# Patient Record
Sex: MALE | Race: WHITE | NOT HISPANIC OR LATINO | Employment: PART TIME | URBAN - METROPOLITAN AREA
[De-identification: names, ages, dates, MRNs, and addresses within clinical notes are randomized per-mention and may not be internally consistent; named-entity substitution may affect disease eponyms.]

---

## 2019-06-14 ENCOUNTER — TELEPHONE (OUTPATIENT)
Dept: FAMILY MEDICINE CLINIC | Facility: CLINIC | Age: 69
End: 2019-06-14

## 2019-06-14 ENCOUNTER — OFFICE VISIT (OUTPATIENT)
Dept: FAMILY MEDICINE CLINIC | Facility: CLINIC | Age: 69
End: 2019-06-14

## 2019-06-14 VITALS
HEART RATE: 84 BPM | DIASTOLIC BLOOD PRESSURE: 86 MMHG | SYSTOLIC BLOOD PRESSURE: 138 MMHG | WEIGHT: 170 LBS | TEMPERATURE: 97.2 F | RESPIRATION RATE: 16 BRPM

## 2019-06-14 DIAGNOSIS — M54.41 ACUTE RIGHT-SIDED LOW BACK PAIN WITH RIGHT-SIDED SCIATICA: Primary | ICD-10-CM

## 2019-06-14 DIAGNOSIS — I10 ESSENTIAL HYPERTENSION: ICD-10-CM

## 2019-06-14 PROCEDURE — 99204 OFFICE O/P NEW MOD 45 MIN: CPT | Performed by: FAMILY MEDICINE

## 2019-06-14 PROCEDURE — 96372 THER/PROPH/DIAG INJ SC/IM: CPT | Performed by: FAMILY MEDICINE

## 2019-06-14 RX ORDER — TRIAMCINOLONE ACETONIDE 40 MG/ML
40 INJECTION, SUSPENSION INTRA-ARTICULAR; INTRAMUSCULAR ONCE
Status: COMPLETED | OUTPATIENT
Start: 2019-06-14 | End: 2019-06-14

## 2019-06-14 RX ORDER — TAMSULOSIN HYDROCHLORIDE 0.4 MG/1
1 CAPSULE ORAL DAILY
Refills: 0 | COMMUNITY
Start: 2019-06-06 | End: 2019-09-13

## 2019-06-14 RX ORDER — OXYCODONE HYDROCHLORIDE AND ACETAMINOPHEN 5; 325 MG/1; MG/1
1 TABLET ORAL EVERY 6 HOURS PRN
Qty: 10 TABLET | Refills: 0 | Status: SHIPPED | OUTPATIENT
Start: 2019-06-14 | End: 2019-06-20

## 2019-06-14 RX ORDER — METHOCARBAMOL 500 MG/1
1 TABLET, FILM COATED ORAL AS NEEDED
Refills: 0 | COMMUNITY
Start: 2019-06-01 | End: 2019-06-14

## 2019-06-14 RX ORDER — LISINOPRIL 10 MG/1
10 TABLET ORAL DAILY
Qty: 90 TABLET | Refills: 1 | Status: SHIPPED | OUTPATIENT
Start: 2019-06-14 | End: 2019-06-20

## 2019-06-14 RX ORDER — SULFAMETHOXAZOLE AND TRIMETHOPRIM 800; 160 MG/1; MG/1
1 TABLET ORAL 2 TIMES DAILY
Refills: 0 | COMMUNITY
Start: 2019-06-06 | End: 2019-06-20

## 2019-06-14 RX ORDER — PREDNISONE 10 MG/1
TABLET ORAL
Qty: 21 TABLET | Refills: 0 | Status: SHIPPED | OUTPATIENT
Start: 2019-06-14 | End: 2019-06-20

## 2019-06-14 RX ORDER — TIZANIDINE HYDROCHLORIDE 4 MG/1
4 CAPSULE, GELATIN COATED ORAL 3 TIMES DAILY PRN
Qty: 30 CAPSULE | Refills: 0 | Status: SHIPPED | OUTPATIENT
Start: 2019-06-14 | End: 2019-09-13

## 2019-06-14 RX ADMIN — TRIAMCINOLONE ACETONIDE 40 MG: 40 INJECTION, SUSPENSION INTRA-ARTICULAR; INTRAMUSCULAR at 09:35

## 2019-06-14 NOTE — PATIENT INSTRUCTIONS
Go to the nearest ER or return to clinic if symptoms worsen, fever/chill develop    Sciatica  Sciatica is pain, numbness, weakness, or tingling along your sciatic nerve. The sciatic nerve starts in the lower back and goes down the back of each leg. Sciatica happens when this nerve is pinched or has pressure put on it. Sciatica usually goes away on its own or with treatment. Sometimes, sciatica may keep coming back (recur).  Follow these instructions at home:  Medicines  · Take over-the-counter and prescription medicines only as told by your doctor.  · Do not drive or use heavy machinery while taking prescription pain medicine.  Managing pain  · If directed, put ice on the affected area.  ? Put ice in a plastic bag.  ? Place a towel between your skin and the bag.  ? Leave the ice on for 20 minutes, 2-3 times a day.  · After icing, apply heat to the affected area before you exercise or as often as told by your doctor. Use the heat source that your doctor tells you to use, such as a moist heat pack or a heating pad.  ? Place a towel between your skin and the heat source.  ? Leave the heat on for 20-30 minutes.  ? Remove the heat if your skin turns bright red. This is especially important if you are unable to feel pain, heat, or cold. You may have a greater risk of getting burned.  Activity  · Return to your normal activities as told by your doctor. Ask your doctor what activities are safe for you.  ? Avoid activities that make your sciatica worse.  · Take short rests during the day. Rest in a lying or standing position. This is usually better than sitting to rest.  ? When you rest for a long time, do some physical activity or stretching between periods of rest.  ? Avoid sitting for a long time without moving. Get up and move around at least one time each hour.  · Exercise and stretch regularly, as told by your doctor.  · Do not lift anything that is heavier than 10 lb (4.5 kg) while you have symptoms of  sciatica.  ? Avoid lifting heavy things even when you do not have symptoms.  ? Avoid lifting heavy things over and over.  · When you lift objects, always lift in a way that is safe for your body. To do this, you should:  ? Bend your knees.  ? Keep the object close to your body.  ? Avoid twisting.  General instructions  · Use good posture.  ? Avoid leaning forward when you are sitting.  ? Avoid hunching over when you are standing.  · Stay at a healthy weight.  · Wear comfortable shoes that support your feet. Avoid wearing high heels.  · Avoid sleeping on a mattress that is too soft or too hard. You might have less pain if you sleep on a mattress that is firm enough to support your back.  · Keep all follow-up visits as told by your doctor. This is important.  Contact a doctor if:  · You have pain that:  ? Wakes you up when you are sleeping.  ? Gets worse when you lie down.  ? Is worse than the pain you have had in the past.  ? Lasts longer than 4 weeks.  · You lose weight for without trying.  Get help right away if:  · You cannot control when you pee (urinate) or poop (have a bowel movement).  · You have weakness in any of these areas and it gets worse.  ? Lower back.  ? Lower belly (pelvis).  ? Butt (buttocks).  ? Legs.  · You have redness or swelling of your back.  · You have a burning feeling when you pee.  This information is not intended to replace advice given to you by your health care provider. Make sure you discuss any questions you have with your health care provider.  Document Released: 09/26/2009 Document Revised: 05/25/2017 Document Reviewed: 08/26/2016  [employer] Patient Education © 2019 [employer] Inc[employer]

## 2019-06-14 NOTE — TELEPHONE ENCOUNTER
----- Message from Sky Diaz Rep sent at 6/14/2019  1:14 PM EDT -----  Contact: LASHANDA CRAWFORD  TiZANidine (ZANAFLEX) 4 MG capsule REQUESTING TO CHANGE TO TABLETS BECAUSE TABLETS ARE COVERED BY PATIENTS [facility].    [phone]

## 2019-06-14 NOTE — PROGRESS NOTES
Subjective   Cayden Nielsen is a 69 y.o. male.   Chief Complaint   Patient presents with   • Establish Care   • Back Pain     into the right leg x 3 weeks, patient was seen at [facility] [place] and [facility]      Back pain started abruptly, no injury. Thought it was a kidney stone, however was evaluated at [facility] [place] and was confirmed to not have a kidney stone. Treated with muscle relaxer and naproxen. Days later, he went to [facility] and was told that it could be sciatica  Treated with Tamsulosin to help with possible kidney stone, bactrim for possible prostatitis, and methocarbamol for sciatica. He was provided an injection at [facility], Toradol. Hydrocodone didn't improve pain.   Heating pad does help symptoms temporarily.    He does lift often at work. He is a  at [employer]. Stopped lifting 50 lbs potatoes recently, causing back pain. He has been off from work for 2 weeks due to acute symptoms.      Back Pain   This is a new problem. The current episode started 1 to 4 weeks ago (3 weeks). The problem occurs constantly. The problem is unchanged. The pain is present in the lumbar spine. The quality of the pain is described as burning and aching. The pain radiates to the right thigh. The pain is at a severity of 8/10. The pain is moderate. The pain is worse during the night. The symptoms are aggravated by lying down and standing. Associated symptoms include leg pain, numbness, paresthesias and tingling. Pertinent negatives include no bladder incontinence, bowel incontinence, chest pain, fever, pelvic pain or perianal numbness. He has tried muscle relaxant, NSAIDs, heat and home exercises for the symptoms. The treatment provided mild relief.     His blood pressure has also been elevated on recent visits at ER and [facility]. He never had primary care prior to today, no history of HTN.   Denies chest pain, headaches, dizziness.   Systolic blood pressure has been in 150s.     The following portions of the patient's history were reviewed and  updated as appropriate: allergies, current medications, past family history, past medical history, past social history, past surgical history and problem list.    Review of Systems   Constitutional: Negative for chills, fatigue and fever.   HENT: Negative.    Eyes: Negative.    Respiratory: Negative for cough, chest tightness and shortness of breath.    Cardiovascular: Negative for chest pain and palpitations.   Gastrointestinal: Negative.  Negative for bowel incontinence.   Genitourinary: Negative for urinary incontinence and pelvic pain.   Musculoskeletal: Positive for back pain.   Neurological: Positive for tingling, numbness and paresthesias. Negative for light-headedness, headache and confusion.   Hematological: Negative for adenopathy. Does not bruise/bleed easily.   Psychiatric/Behavioral: Negative.        Objective   Physical Exam   Constitutional: He is oriented to person, place, and time. He appears well-developed and well-nourished. No distress.   HENT:   Head: Normocephalic.   Right Ear: Hearing and external ear normal.   Left Ear: Hearing and external ear normal.   Nose: Nose normal.   Eyes: Conjunctivae are normal.   Neck: Neck supple. No thyromegaly present.   Cardiovascular: Normal rate, regular rhythm and normal heart sounds.   No murmur heard.  Pulmonary/Chest: Effort normal and breath sounds normal. He has no wheezes.   Musculoskeletal: He exhibits no edema or deformity.        Lumbar back: He exhibits decreased range of motion, tenderness (right side) and spasm.   Lymphadenopathy:     He has no cervical adenopathy.   Neurological: He is alert and oriented to person, place, and time. A sensory deficit (Right anterior thigh) is present.   Reflex Scores:       Patellar reflexes are 1+ on the right side and 2+ on the left side.       Achilles reflexes are 1+ on the right side and 2+ on the left side.  Muscle strength: RLE 4/5, LLE 5/5   Skin: Skin is warm and dry.   Psychiatric: He has a normal mood  and affect. His behavior is normal. Thought content normal.   Nursing note and vitals reviewed.        Assessment/Plan   Cayden was seen today for establish care and back pain.    Diagnoses and all orders for this visit:    Acute right-sided low back pain with right-sided sciatica  -     triamcinolone acetonide (KENALOG-40) injection 40 mg  -     predniSONE (DELTASONE) 10 MG tablet; Take 60 mg po day 1, 50 mg day 2, 40 mg day 3, 30 mg day 4, 20 mg day 5, 10 mg day 6  -     TiZANidine (ZANAFLEX) 4 MG capsule; Take 1 capsule by mouth 3 (Three) Times a Day As Needed for Muscle Spasms.  -     Ambulatory Referral to Physical Therapy Evaluate and treat  -     oxyCODONE-acetaminophen (PERCOCET) 5-325 MG per tablet; Take 1 tablet by mouth Every 6 (Six) Hours As Needed for Moderate Pain .    Essential hypertension  -     lisinopril (PRINIVIL,ZESTRIL) 10 MG tablet; Take 1 tablet by mouth Daily.      Kenalog IM provided in office today, tolerated well.   Stop methocarbamol due to ineffectiveness. Tizanidine, prednisone taper and temporary pain relief to improve back pain and sciatica. Referred to PT. If symptoms persist, consider MRI lumbar.   Will provide time off from work for recovery.   Start lisinopril to improve HTN. Advised to monitor blood pressure daily, goal is 120/80. Will need to complete labs at follow up visit.    ISELA query complete. Treatment plan to include limited course of prescribed controlled substance. Risks including addiction, benefits, and alternatives presented to patient.

## 2019-06-20 ENCOUNTER — OFFICE VISIT (OUTPATIENT)
Dept: FAMILY MEDICINE CLINIC | Facility: CLINIC | Age: 69
End: 2019-06-20

## 2019-06-20 VITALS
SYSTOLIC BLOOD PRESSURE: 82 MMHG | TEMPERATURE: 97.9 F | DIASTOLIC BLOOD PRESSURE: 58 MMHG | RESPIRATION RATE: 18 BRPM | WEIGHT: 170.5 LBS | HEART RATE: 80 BPM

## 2019-06-20 DIAGNOSIS — I95.2 HYPOTENSION DUE TO DRUGS: ICD-10-CM

## 2019-06-20 DIAGNOSIS — M54.41 ACUTE RIGHT-SIDED LOW BACK PAIN WITH RIGHT-SIDED SCIATICA: Primary | ICD-10-CM

## 2019-06-20 PROCEDURE — 99214 OFFICE O/P EST MOD 30 MIN: CPT | Performed by: FAMILY MEDICINE

## 2019-06-20 RX ORDER — IBUPROFEN 800 MG/1
800 TABLET ORAL EVERY 8 HOURS PRN
Qty: 90 TABLET | Refills: 0 | Status: SHIPPED | OUTPATIENT
Start: 2019-06-20 | End: 2019-09-13

## 2019-06-20 RX ORDER — HYDROCODONE BITARTRATE AND ACETAMINOPHEN 5; 325 MG/1; MG/1
1 TABLET ORAL EVERY 6 HOURS PRN
Qty: 10 TABLET | Refills: 0 | Status: SHIPPED | OUTPATIENT
Start: 2019-06-20 | End: 2019-09-13

## 2019-06-20 NOTE — PROGRESS NOTES
"Subjective   Cayden Nielsen is a 69 y.o. male.   Chief Complaint   Patient presents with   • Back Pain     Started again couple days ago. BP low today. Patient said he has been a bit \"woozy\" lately.      History of Present Illness   He was seen on 6/14/19 for back pain, treated with Toradol IM, tizanidine, percocet, and prednisone taper. He only took percocet twice daily, completed script.   Prior to his treatment on 6/14/2019 with me, he had been treated at an urgent treatment center and at an ER.  Imaging was completed at outside facility, however I don't have records.   Pain still present, sometimes severe to the point he gets nauseous.    Initially after treatment on 6/14/19, he had relief for 3-4 days. He did yard work, chores at home, mopped. Symptoms started again 2 nights ago.   Pain tends to be worse at night.   He was referred to physical therapy, planning on scheduling appointment tomorrow.  He works at [employer] in the [employer] department.  His job requires that he lift, bend, pull and push frequently.  He has been out of work over the last 2 weeks due to symptoms of acute back pain.    Blood pressure is low today.  Last visit, blood pressure was elevated and he reported that it was elevated at previous visits with outside providers.  He was started on lisinopril 10 mg daily.  He denies feeling lightheaded, dizziness, or any syncopal episodes.  He has not been monitoring his blood pressure at home.    The following portions of the patient's history were reviewed and updated as appropriate: allergies, current medications, past family history, past medical history, past social history, past surgical history and problem list.    Review of Systems   Constitutional: Negative.    Eyes: Negative.    Respiratory: Negative for cough and shortness of breath.    Cardiovascular: Negative for chest pain, palpitations and leg swelling.   Gastrointestinal: Negative.    Genitourinary: Negative for urinary incontinence. "   Musculoskeletal: Positive for back pain.   Neurological: Positive for numbness. Negative for dizziness, syncope, light-headedness and confusion.   Psychiatric/Behavioral: Negative.        Objective   Physical Exam   Constitutional: He is oriented to person, place, and time. He appears well-developed and well-nourished. No distress.   HENT:   Head: Normocephalic.   Right Ear: Hearing and external ear normal.   Left Ear: Hearing and external ear normal.   Nose: Nose normal.   Eyes: Conjunctivae are normal.   Cardiovascular: Normal rate, regular rhythm and normal heart sounds.   Pulmonary/Chest: Effort normal and breath sounds normal.   Musculoskeletal: He exhibits no deformity.        Lumbar back: He exhibits decreased range of motion, tenderness (right side) and spasm.   Neurological: He is alert and oriented to person, place, and time.   Skin: Skin is warm and dry.   Psychiatric: His behavior is normal.   Nursing note and vitals reviewed.        Assessment/Plan   Cayden was seen today for back pain.    Diagnoses and all orders for this visit:    Acute right-sided low back pain with right-sided sciatica  -     HYDROcodone-acetaminophen (NORCO) 5-325 MG per tablet; Take 1 tablet by mouth Every 6 (Six) Hours As Needed for Moderate Pain  (use for breakthrough pain).  -     ibuprofen (ADVIL,MOTRIN) 800 MG tablet; Take 1 tablet by mouth Every 8 (Eight) Hours As Needed for Moderate Pain .    Hypotension due to drugs      Additional pain relief prescribed. Recommended that he schedule PT as soon as possible.   If symptoms continue to persist, consider MRI of lumbar spine.    He is hypotensive today, but asymptomatic.  Advised him to stop lisinopril 10 mg daily.  Monitor blood pressure at home, if greater than 130/90 for 3 consecutive days, will resume treatment but at a lower dose.

## 2019-09-13 ENCOUNTER — OFFICE VISIT (OUTPATIENT)
Dept: FAMILY MEDICINE CLINIC | Facility: CLINIC | Age: 69
End: 2019-09-13

## 2019-09-13 VITALS
DIASTOLIC BLOOD PRESSURE: 82 MMHG | WEIGHT: 176 LBS | RESPIRATION RATE: 18 BRPM | SYSTOLIC BLOOD PRESSURE: 130 MMHG | HEART RATE: 82 BPM | BODY MASS INDEX: 25.2 KG/M2 | TEMPERATURE: 98.5 F | HEIGHT: 70 IN

## 2019-09-13 DIAGNOSIS — I95.2 HYPOTENSION DUE TO DRUGS: ICD-10-CM

## 2019-09-13 DIAGNOSIS — Z13.6 ENCOUNTER FOR LIPID SCREENING FOR CARDIOVASCULAR DISEASE: ICD-10-CM

## 2019-09-13 DIAGNOSIS — M54.50 LUMBAR BACK PAIN: Primary | ICD-10-CM

## 2019-09-13 DIAGNOSIS — Z12.5 PROSTATE CANCER SCREENING: ICD-10-CM

## 2019-09-13 DIAGNOSIS — Z13.220 ENCOUNTER FOR LIPID SCREENING FOR CARDIOVASCULAR DISEASE: ICD-10-CM

## 2019-09-13 PROCEDURE — 99213 OFFICE O/P EST LOW 20 MIN: CPT | Performed by: FAMILY MEDICINE

## 2019-09-13 NOTE — PROGRESS NOTES
Assessment/Plan       Problems Addressed this Visit     None      Visit Diagnoses     Lumbar back pain    -  Primary    Resolved. Doing better. P T helped and no pain now    Hypotension due to drugs        Resolved and BP good now    Encounter for lipid screening for cardiovascular disease        Relevant Orders    Lipid Panel With / Chol / HDL Ratio    Comprehensive Metabolic Panel    Prostate cancer screening        Relevant Orders    PSA Screen            Follow up: Return if symptoms worsen or fail to improve, for follow up depends on review of labs and testing.     DISCUSSION  Overall doing well.  Recommend check labs as noted.  Further plan once labs reviewed.  He does not wish to receive a pneumococcal vaccine or flu shot.  He also declines colonoscopy.      MEDICATIONS PRESCRIBED  Requested Prescriptions      No prescriptions requested or ordered in this encounter            -------------------------------------------    Subjective     Chief Complaint   Patient presents with   • Back Pain     3 month f/u          History of Present Illness    Back pain  Is doing better  P T for 8 visits and doing better now  No pain now  No leg pain     BP was low last visit  Had elevated BP in the past  Was started on lisinopril and then BP dropped and quit this  No issues since  No dizziness  No chest pain   No shortness of breath      Lipids not for years  FH: no family history of heart disease    Father  of lung cancer  Mother + cva and     Work 3-4 days per week at [employer]    Social History     Tobacco Use   Smoking Status Never Smoker   Smokeless Tobacco Never Used          Past Medical History,Medications, Allergies, and social history was reviewed.        Patient's Body mass index is 25.25 kg/m². BMI is within normal parameters. No follow-up required..         Review of Systems   Constitutional: Negative.    HENT: Negative.    Respiratory: Negative.    Cardiovascular: Negative.   "  Gastrointestinal: Negative.    Genitourinary: Negative.    Musculoskeletal: Negative.    Neurological: Negative.    Psychiatric/Behavioral: Negative.        Objective     Vitals:    09/13/19 0903   BP: 130/82   Pulse: 82   Resp: 18   Temp: 98.5 °F (36.9 °C)   Weight: 79.8 kg (176 lb)   Height: 177.8 cm (70\")          Physical Exam   Constitutional: Vital signs are normal. He appears well-developed and well-nourished.   HENT:   Head: Normocephalic and atraumatic.   Right Ear: Hearing, tympanic membrane, external ear and ear canal normal.   Left Ear: Hearing, tympanic membrane, external ear and ear canal normal.   Mouth/Throat: Oropharynx is clear and moist.   Eyes: Conjunctivae, EOM and lids are normal. Pupils are equal, round, and reactive to light.   Neck: Normal range of motion. Neck supple. No thyromegaly present.   Cardiovascular: Normal rate, regular rhythm and normal heart sounds. Exam reveals no friction rub.   No murmur heard.  Pulmonary/Chest: Effort normal and breath sounds normal. No respiratory distress. He has no wheezes. He has no rales.   Abdominal: Soft. Normal appearance and bowel sounds are normal. He exhibits no distension and no mass. There is no tenderness. There is no rebound and no guarding.   Musculoskeletal: He exhibits no edema.        Lumbar back: He exhibits normal range of motion, no tenderness and no bony tenderness.   Neurological: He is alert. He has normal strength.   Skin: Skin is warm and dry.   Psychiatric: He has a normal mood and affect. His speech is normal. Cognition and memory are normal.   Nursing note and vitals reviewed.                Chilango Nielsen MD    "

## 2019-09-14 LAB
ALBUMIN SERPL-MCNC: 4.3 G/DL (ref 3.5–5.2)
ALBUMIN/GLOB SERPL: 1.7 G/DL
ALP SERPL-CCNC: 107 U/L (ref 39–117)
ALT SERPL-CCNC: 15 U/L (ref 1–41)
AST SERPL-CCNC: 17 U/L (ref 1–40)
BILIRUB SERPL-MCNC: 0.4 MG/DL (ref 0.2–1.2)
BUN SERPL-MCNC: 19 MG/DL (ref 8–23)
BUN/CREAT SERPL: 20.2 (ref 7–25)
CALCIUM SERPL-MCNC: 9.1 MG/DL (ref 8.6–10.5)
CHLORIDE SERPL-SCNC: 104 MMOL/L (ref 98–107)
CHOLEST SERPL-MCNC: 169 MG/DL (ref 0–200)
CHOLEST/HDLC SERPL: 3.25 {RATIO}
CO2 SERPL-SCNC: 26.5 MMOL/L (ref 22–29)
CREAT SERPL-MCNC: 0.94 MG/DL (ref 0.76–1.27)
GLOBULIN SER CALC-MCNC: 2.5 GM/DL
GLUCOSE SERPL-MCNC: 97 MG/DL (ref 65–99)
HDLC SERPL-MCNC: 52 MG/DL (ref 40–60)
LDLC SERPL CALC-MCNC: 99 MG/DL (ref 0–100)
POTASSIUM SERPL-SCNC: 4.5 MMOL/L (ref 3.5–5.2)
PROT SERPL-MCNC: 6.8 G/DL (ref 6–8.5)
PSA SERPL-MCNC: 7.48 NG/ML (ref 0–4)
SODIUM SERPL-SCNC: 143 MMOL/L (ref 136–145)
TRIGL SERPL-MCNC: 90 MG/DL (ref 0–150)
VLDLC SERPL CALC-MCNC: 18 MG/DL

## 2019-09-17 DIAGNOSIS — R97.20 ELEVATED PSA: Primary | ICD-10-CM

## 2019-12-18 DIAGNOSIS — Z20.828 EXPOSURE TO THE FLU: Primary | ICD-10-CM

## 2019-12-18 RX ORDER — OSELTAMIVIR PHOSPHATE 75 MG/1
75 CAPSULE ORAL DAILY
Qty: 10 CAPSULE | Refills: 0 | Status: SHIPPED | OUTPATIENT
Start: 2019-12-18 | End: 2019-12-28

## 2020-09-01 DIAGNOSIS — I10 ESSENTIAL HYPERTENSION: ICD-10-CM

## 2020-09-01 RX ORDER — LISINOPRIL 10 MG/1
TABLET ORAL
Qty: 90 TABLET | Refills: 1 | OUTPATIENT
Start: 2020-09-01

## 2021-06-09 ENCOUNTER — OFFICE VISIT (OUTPATIENT)
Dept: FAMILY MEDICINE CLINIC | Facility: CLINIC | Age: 71
End: 2021-06-09

## 2021-06-09 VITALS
RESPIRATION RATE: 20 BRPM | DIASTOLIC BLOOD PRESSURE: 90 MMHG | HEIGHT: 70 IN | TEMPERATURE: 97.6 F | BODY MASS INDEX: 26.34 KG/M2 | OXYGEN SATURATION: 97 % | HEART RATE: 60 BPM | SYSTOLIC BLOOD PRESSURE: 148 MMHG | WEIGHT: 184 LBS

## 2021-06-09 DIAGNOSIS — M54.42 ACUTE LEFT-SIDED LOW BACK PAIN WITH LEFT-SIDED SCIATICA: Primary | ICD-10-CM

## 2021-06-09 DIAGNOSIS — D48.9 NEOPLASM OF UNCERTAIN BEHAVIOR: ICD-10-CM

## 2021-06-09 PROCEDURE — 99213 OFFICE O/P EST LOW 20 MIN: CPT | Performed by: FAMILY MEDICINE

## 2021-06-09 RX ORDER — CELECOXIB 200 MG/1
200 CAPSULE ORAL 2 TIMES DAILY PRN
Qty: 30 CAPSULE | Refills: 2 | Status: SHIPPED | OUTPATIENT
Start: 2021-06-09 | End: 2021-08-27

## 2021-06-09 NOTE — PATIENT INSTRUCTIONS
Go to the nearest ER or return to clinic if symptoms worsen, fever/chill develop    Sciatica    Sciatica is pain, weakness, tingling, or loss of feeling (numbness) along the sciatic nerve. The sciatic nerve starts in the lower back and goes down the back of each leg. Sciatica usually goes away on its own or with treatment. Sometimes, sciatica may come back (recur).  What are the causes?  This condition happens when the sciatic nerve is pinched or has pressure put on it. This may be the result of:  · A disk in between the bones of the spine bulging out too far (herniated disk).  · Changes in the spinal disks that occur with aging.  · A condition that affects a muscle in the butt.  · Extra bone growth near the sciatic nerve.  · A break (fracture) of the area between your hip bones (pelvis).  · Pregnancy.  · Tumor. This is rare.  What increases the risk?  You are more likely to develop this condition if you:  · Play sports that put pressure or stress on the spine.  · Have poor strength and ease of movement (flexibility).  · Have had a back injury in the past.  · Have had back surgery.  · Sit for long periods of time.  · Do activities that involve bending or lifting over and over again.  · Are very overweight (obese).  What are the signs or symptoms?  Symptoms can vary from mild to very bad. They may include:  · Any of these problems in the lower back, leg, hip, or butt:  ? Mild tingling, loss of feeling, or dull aches.  ? Burning sensations.  ? Sharp pains.  · Loss of feeling in the back of the calf or the sole of the foot.  · Leg weakness.  · Very bad back pain that makes it hard to move.  These symptoms may get worse when you cough, sneeze, or laugh. They may also get worse when you sit or stand for long periods of time.  How is this treated?  This condition often gets better without any treatment. However, treatment may include:  · Changing or cutting back on physical activity when you have pain.  · Doing exercises  and stretching.  · Putting ice or heat on the affected area.  · Medicines that help:  ? To relieve pain and swelling.  ? To relax your muscles.  · Shots (injections) of medicines that help to relieve pain, irritation, and swelling.  · Surgery.  Follow these instructions at home:  Medicines  · Take over-the-counter and prescription medicines only as told by your doctor.  · Ask your doctor if the medicine prescribed to you:  ? Requires you to avoid driving or using heavy machinery.  ? Can cause trouble pooping (constipation). You may need to take these steps to prevent or treat trouble pooping:  § Drink enough fluids to keep your pee (urine) pale yellow.  § Take over-the-counter or prescription medicines.  § Eat foods that are high in fiber. These include beans, whole grains, and fresh fruits and vegetables.  § Limit foods that are high in fat and sugar. These include fried or sweet foods.  Managing pain         · If told, put ice on the affected area.  ? Put ice in a plastic bag.  ? Place a towel between your skin and the bag.  ? Leave the ice on for 20 minutes, 2-3 times a day.  · If told, put heat on the affected area. Use the heat source that your doctor tells you to use, such as a moist heat pack or a heating pad.  ? Place a towel between your skin and the heat source.  ? Leave the heat on for 20-30 minutes.  ? Remove the heat if your skin turns bright red. This is very important if you are unable to feel pain, heat, or cold. You may have a greater risk of getting burned.  Activity    · Return to your normal activities as told by your doctor. Ask your doctor what activities are safe for you.  · Avoid activities that make your symptoms worse.  · Take short rests during the day.  ? When you rest for a long time, do some physical activity or stretching between periods of rest.  ? Avoid sitting for a long time without moving. Get up and move around at least one time each hour.  · Exercise and stretch regularly, as  told by your doctor.  · Do not lift anything that is heavier than 10 lb (4.5 kg) while you have symptoms of sciatica.  ? Avoid lifting heavy things even when you do not have symptoms.  ? Avoid lifting heavy things over and over.  · When you lift objects, always lift in a way that is safe for your body. To do this, you should:  ? Bend your knees.  ? Keep the object close to your body.  ? Avoid twisting.  General instructions  · Stay at a healthy weight.  · Wear comfortable shoes that support your feet. Avoid wearing high heels.  · Avoid sleeping on a mattress that is too soft or too hard. You might have less pain if you sleep on a mattress that is firm enough to support your back.  · Keep all follow-up visits as told by your doctor. This is important.  Contact a doctor if:  · You have pain that:  ? Wakes you up when you are sleeping.  ? Gets worse when you lie down.  ? Is worse than the pain you have had in the past.  ? Lasts longer than 4 weeks.  · You lose weight without trying.  Get help right away if:  · You cannot control when you pee (urinate) or poop (have a bowel movement).  · You have weakness in any of these areas and it gets worse:  ? Lower back.  ? The area between your hip bones.  ? Butt.  ? Legs.  · You have redness or swelling of your back.  · You have a burning feeling when you pee.  Summary  · Sciatica is pain, weakness, tingling, or loss of feeling (numbness) along the sciatic nerve.  · This condition happens when the sciatic nerve is pinched or has pressure put on it.  · Sciatica can cause pain, tingling, or loss of feeling (numbness) in the lower back, legs, hips, and butt.  · Treatment often includes rest, exercise, medicines, and putting ice or heat on the affected area.  This information is not intended to replace advice given to you by your health care provider. Make sure you discuss any questions you have with your health care provider.  Document Revised: 01/06/2020 Document Reviewed:  01/06/2020  [employer] Patient Education © 2021 [employer]

## 2021-06-09 NOTE — PROGRESS NOTES
"Chief Complaint  Back pain-L side-down leg x 1wk (Took Wife's Celebrex and helped a lot)    Subjective          Cayden Nielsen presents to [facility]  He has a history of low back pain and sciatica. Most recent episode started 1-2 weeks ago. No specific injury, thinks he just turned the wrong way.  Tried treating with ibuprofen and hydrocodone without relief. His wife has a prescription of Celebrex and he tried that. He has had significant relief with Celebrex, requesting a prescription.     Back Pain  This is a recurrent problem. The current episode started 1 to 4 weeks ago (1-2 weeks). The problem occurs 2 to 4 times per day. The pain is present in the sacro-iliac and lumbar spine. The pain radiates to the left knee. The pain is at a severity of 4/10. The pain is moderate. The symptoms are aggravated by standing and position. Associated symptoms include tingling (LLE). Pertinent negatives include no bladder incontinence or bowel incontinence. He has tried NSAIDs and analgesics for the symptoms. The treatment provided moderate relief.     Skin lesion on left lower extremity, dark color  Lesion present for over a year and has gotten darker     The following portions of the patient's history were reviewed and updated as appropriate: allergies, current medications, past family history, past medical history, past social history, past surgical history and problem list.    Objective   Vital Signs:   /90   Pulse 60   Temp 97.6 °F (36.4 °C)   Resp 20   Ht 177.8 cm (70\")   Wt 83.5 kg (184 lb)   SpO2 97%   BMI 26.40 kg/m²     Physical Exam  Vitals and nursing note reviewed.   Constitutional:       Appearance: He is well-developed.   HENT:      Head: Normocephalic and atraumatic.      Nose: Nose normal.   Eyes:      Conjunctiva/sclera: Conjunctivae normal.   Cardiovascular:      Rate and Rhythm: Normal rate and regular rhythm.      Heart sounds: Normal heart sounds.   Pulmonary:     "  Effort: Pulmonary effort is normal.      Breath sounds: Normal breath sounds. No wheezing.   Musculoskeletal:         General: No deformity.      Lumbar back: Tenderness present. Decreased range of motion.   Skin:     General: Skin is warm and dry.          Neurological:      General: No focal deficit present.      Mental Status: He is alert and oriented to person, place, and time.   Psychiatric:         Behavior: Behavior normal.        Result Review :                 Assessment and Plan    Diagnoses and all orders for this visit:    1. Acute left-sided low back pain with left-sided sciatica (Primary)  -     celecoxib (CeleBREX) 200 MG capsule; Take 1 capsule by mouth 2 (Two) Times a Day As Needed for Moderate Pain .  Dispense: 30 capsule; Refill: 2  -     Ambulatory Referral to Physical Therapy Evaluate and treat    2. Neoplasm of uncertain behavior  -     Ambulatory Referral to Dermatology    Celebrex prn to improve back pain, resume PT.   Referred to dermatology for skin check. Likely SK on anterior left leg, however there is an area of lesion that isn't healing.      Follow Up   Return if symptoms worsen or fail to improve.  Patient was given instructions and counseling regarding his condition or for health maintenance advice. Please see specific information pulled into the AVS if appropriate.

## 2021-08-27 ENCOUNTER — OFFICE VISIT (OUTPATIENT)
Dept: FAMILY MEDICINE CLINIC | Facility: CLINIC | Age: 71
End: 2021-08-27

## 2021-08-27 VITALS
HEIGHT: 70 IN | TEMPERATURE: 97.3 F | HEART RATE: 60 BPM | WEIGHT: 176 LBS | DIASTOLIC BLOOD PRESSURE: 76 MMHG | RESPIRATION RATE: 18 BRPM | OXYGEN SATURATION: 98 % | SYSTOLIC BLOOD PRESSURE: 116 MMHG | BODY MASS INDEX: 25.2 KG/M2

## 2021-08-27 DIAGNOSIS — I65.22 LEFT CAROTID ARTERY STENOSIS: Primary | ICD-10-CM

## 2021-08-27 DIAGNOSIS — Z13.220 SCREENING FOR HYPERLIPIDEMIA: ICD-10-CM

## 2021-08-27 PROCEDURE — 99213 OFFICE O/P EST LOW 20 MIN: CPT | Performed by: FAMILY MEDICINE

## 2021-08-27 RX ORDER — ASPIRIN 325 MG
325 TABLET ORAL DAILY
COMMUNITY
End: 2022-10-19

## 2021-08-27 NOTE — PROGRESS NOTES
"Chief Complaint  [facility] ER f/u-R side numbness (from picking up a box, corner hit him in neck )    Subjective          Cayden Nielsen presents to [facility]  History of Present Illness  He went to [facility] ER 8/3/21 after he lifted a cardboard box full of bananas and hit left side of his neck. He immediately had symptoms of weakness and tingling in his RUE. He was able to lift RUE, but would immediately fall back down. He was unable to make a fist with right hand. Symptoms in RUE resolved after a few hours from incident and hasn't had any complication with right upper extremity since then.  During the evaluation at the emergency department, they completed CTA neck to evaluate which revealed 70% stenosis of left carotid artery stenosis. He was advised to start  mg, which he is taking. Most recent FLP completed in 2019, normal.     The following portions of the patient's history were reviewed and updated as appropriate: allergies, current medications, past family history, past medical history, past social history, past surgical history and problem list.    Objective   Vital Signs:   /76   Pulse 60   Temp 97.3 °F (36.3 °C)   Resp 18   Ht 177.8 cm (70\")   Wt 79.8 kg (176 lb)   SpO2 98%   BMI 25.25 kg/m²     Physical Exam  Vitals and nursing note reviewed.   Constitutional:       Appearance: Normal appearance. He is well-developed.   HENT:      Head: Normocephalic and atraumatic.      Nose: Nose normal.   Eyes:      Conjunctiva/sclera: Conjunctivae normal.   Neck:      Vascular: No carotid bruit.   Cardiovascular:      Rate and Rhythm: Normal rate and regular rhythm.      Heart sounds: Normal heart sounds.   Pulmonary:      Effort: Pulmonary effort is normal. No respiratory distress.      Breath sounds: Normal breath sounds.   Musculoskeletal:         General: No deformity.      Cervical back: Neck supple.   Skin:     General: Skin is warm and dry.   Neurological:      General: " No focal deficit present.      Mental Status: He is alert and oriented to person, place, and time.   Psychiatric:         Behavior: Behavior normal.        Result Review :                 Assessment and Plan    Diagnoses and all orders for this visit:    1. Left carotid artery stenosis (Primary)  -     Ambulatory Referral to Cardiology  -     CBC & Differential; Future  -     Comprehensive Metabolic Panel; Future  -     Lipid Panel With / Chol / HDL Ratio; Future    2. Screening for hyperlipidemia  -     Lipid Panel With / Chol / HDL Ratio; Future    He will return to complete labs. Plan to initiate statin treatment, continue ASA       Follow Up   Return if symptoms worsen or fail to improve.  Patient was given instructions and counseling regarding his condition or for health maintenance advice. Please see specific information pulled into the AVS if appropriate.

## 2021-09-01 ENCOUNTER — LAB (OUTPATIENT)
Dept: FAMILY MEDICINE CLINIC | Facility: CLINIC | Age: 71
End: 2021-09-01

## 2021-09-01 DIAGNOSIS — Z13.220 SCREENING FOR HYPERLIPIDEMIA: ICD-10-CM

## 2021-09-01 DIAGNOSIS — I65.22 LEFT CAROTID ARTERY STENOSIS: ICD-10-CM

## 2021-09-02 ENCOUNTER — TELEPHONE (OUTPATIENT)
Dept: FAMILY MEDICINE CLINIC | Facility: CLINIC | Age: 71
End: 2021-09-02

## 2021-09-02 LAB
ALBUMIN SERPL-MCNC: 4.2 G/DL (ref 3.5–5.2)
ALBUMIN/GLOB SERPL: 2 G/DL
ALP SERPL-CCNC: 106 U/L (ref 39–117)
ALT SERPL-CCNC: 16 U/L (ref 1–41)
AST SERPL-CCNC: 22 U/L (ref 1–40)
BASOPHILS # BLD AUTO: 0.06 10*3/MM3 (ref 0–0.2)
BASOPHILS NFR BLD AUTO: 1 % (ref 0–1.5)
BILIRUB SERPL-MCNC: 0.4 MG/DL (ref 0–1.2)
BUN SERPL-MCNC: 16 MG/DL (ref 8–23)
BUN/CREAT SERPL: 16.5 (ref 7–25)
CALCIUM SERPL-MCNC: 9.2 MG/DL (ref 8.6–10.5)
CHLORIDE SERPL-SCNC: 105 MMOL/L (ref 98–107)
CHOLEST SERPL-MCNC: 204 MG/DL (ref 0–200)
CHOLEST/HDLC SERPL: 4.34 {RATIO}
CO2 SERPL-SCNC: 21.6 MMOL/L (ref 22–29)
CREAT SERPL-MCNC: 0.97 MG/DL (ref 0.76–1.27)
EOSINOPHIL # BLD AUTO: 0.31 10*3/MM3 (ref 0–0.4)
EOSINOPHIL NFR BLD AUTO: 4.9 % (ref 0.3–6.2)
ERYTHROCYTE [DISTWIDTH] IN BLOOD BY AUTOMATED COUNT: 13.3 % (ref 12.3–15.4)
GLOBULIN SER CALC-MCNC: 2.1 GM/DL
GLUCOSE SERPL-MCNC: 96 MG/DL (ref 65–99)
HCT VFR BLD AUTO: 43.7 % (ref 37.5–51)
HDLC SERPL-MCNC: 47 MG/DL (ref 40–60)
HGB BLD-MCNC: 14.5 G/DL (ref 13–17.7)
IMM GRANULOCYTES # BLD AUTO: 0.02 10*3/MM3 (ref 0–0.05)
IMM GRANULOCYTES NFR BLD AUTO: 0.3 % (ref 0–0.5)
LDLC SERPL CALC-MCNC: 148 MG/DL (ref 0–100)
LYMPHOCYTES # BLD AUTO: 1.33 10*3/MM3 (ref 0.7–3.1)
LYMPHOCYTES NFR BLD AUTO: 21.2 % (ref 19.6–45.3)
MCH RBC QN AUTO: 29.6 PG (ref 26.6–33)
MCHC RBC AUTO-ENTMCNC: 33.2 G/DL (ref 31.5–35.7)
MCV RBC AUTO: 89.2 FL (ref 79–97)
MONOCYTES # BLD AUTO: 0.53 10*3/MM3 (ref 0.1–0.9)
MONOCYTES NFR BLD AUTO: 8.5 % (ref 5–12)
NEUTROPHILS # BLD AUTO: 4.02 10*3/MM3 (ref 1.7–7)
NEUTROPHILS NFR BLD AUTO: 64.1 % (ref 42.7–76)
NRBC BLD AUTO-RTO: 0 /100 WBC (ref 0–0.2)
PLATELET # BLD AUTO: 195 10*3/MM3 (ref 140–450)
POTASSIUM SERPL-SCNC: 4.4 MMOL/L (ref 3.5–5.2)
PROT SERPL-MCNC: 6.3 G/DL (ref 6–8.5)
RBC # BLD AUTO: 4.9 10*6/MM3 (ref 4.14–5.8)
SODIUM SERPL-SCNC: 139 MMOL/L (ref 136–145)
TRIGL SERPL-MCNC: 48 MG/DL (ref 0–150)
VLDLC SERPL CALC-MCNC: 9 MG/DL (ref 5–40)
WBC # BLD AUTO: 6.27 10*3/MM3 (ref 3.4–10.8)

## 2021-09-02 NOTE — TELEPHONE ENCOUNTER
Hub staff attempted to follow warm transfer process and was unsuccessful     Caller: Cayden Nielsen    Relationship to patient: Self    Best call back number: [phone]    Patient is needing: RETURNING A CALL

## 2021-09-12 DIAGNOSIS — E78.00 HIGH CHOLESTEROL: ICD-10-CM

## 2021-09-12 DIAGNOSIS — I65.22 LEFT CAROTID ARTERY STENOSIS: Primary | ICD-10-CM

## 2021-09-13 RX ORDER — ATORVASTATIN CALCIUM 40 MG/1
40 TABLET, FILM COATED ORAL NIGHTLY
Qty: 90 TABLET | Refills: 3 | Status: SHIPPED | OUTPATIENT
Start: 2021-09-13 | End: 2022-08-31

## 2021-10-06 ENCOUNTER — OFFICE VISIT (OUTPATIENT)
Dept: CARDIOLOGY | Facility: CLINIC | Age: 71
End: 2021-10-06

## 2021-10-06 VITALS
HEIGHT: 70 IN | HEART RATE: 49 BPM | BODY MASS INDEX: 25.48 KG/M2 | OXYGEN SATURATION: 96 % | WEIGHT: 178 LBS | DIASTOLIC BLOOD PRESSURE: 82 MMHG | SYSTOLIC BLOOD PRESSURE: 146 MMHG

## 2021-10-06 DIAGNOSIS — I65.22 CAROTID ARTERY STENOSIS, SYMPTOMATIC, LEFT: Primary | ICD-10-CM

## 2021-10-06 DIAGNOSIS — E78.5 HYPERLIPIDEMIA, UNSPECIFIED HYPERLIPIDEMIA TYPE: ICD-10-CM

## 2021-10-06 PROCEDURE — 99203 OFFICE O/P NEW LOW 30 MIN: CPT | Performed by: INTERNAL MEDICINE

## 2021-10-06 PROCEDURE — 93000 ELECTROCARDIOGRAM COMPLETE: CPT | Performed by: INTERNAL MEDICINE

## 2021-10-06 NOTE — PROGRESS NOTES
Subjective:     Encounter Date:10/06/2021    Primary Care Physician: Vannessa Tuttle DO      Patient ID: Cayden Nielsen is a 71 y.o. male.    Chief Complaint:Carotid Artery Disease    PROBLEM LIST:  1. Carotid artery disease  a. 8/3/2021 CTA of the neck with 70% left proximal ICA.  No significant right ICA disease.  2. Hypertension  3. History of nephrolithiasis  4. 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  5. Surgeries:  a. None        No Known Allergies      Current Outpatient Medications:   •  aspirin 325 MG tablet, Take 325 mg by mouth Daily., Disp: , Rfl:   •  atorvastatin (Lipitor) 40 MG tablet, Take 1 tablet by mouth Every Night., Disp: 90 tablet, Rfl: 3        History of Present Illness    Patient is a 71-year-old  male who is being seen today for further evaluation of left carotid stenosis.  He has no previous history of any cardiac issues.  Notes that at one point time he was placed on medications for hypertension but this was stopped secondary to hypotension.  Patient reports back in August he was working at [employer] in the [employer] department.  States that he went to lift a box of bananas and hit himself in the neck.  Few minutes after this he experienced right arm weakness.  Notes that he was able to physically lift his right arm but then it fell down.  Did not note any other significant symptoms.  Due to these he was taken to the hospital for further evaluation.  CTA of the neck revealed 70% left ICA disease.  He was initiated on aspirin therapy.  He then had lipids checked with his primary care and was placed on statin therapy.  Given his abnormal CTA results he was referred here for further evaluation.  He denies any chest pain, pressure, tightness.  Denies any increase shortness of breath.  No syncope, near-syncope, or edema.  Has had no further right arm weakness.    The following portions of the patient's history were reviewed and updated as appropriate: allergies, current medications, past family  "history, past medical history, past social history, past surgical history and problem list.    Family History   Problem Relation Age of Onset   • Stroke Mother    • Lung cancer Father        Social History     Tobacco Use   • Smoking status: Never Smoker   • Smokeless tobacco: Never Used   Vaping Use   • Vaping Use: Never used   Substance Use Topics   • Alcohol use: No   • Drug use: No         Review of Systems   Constitutional: Negative for fever and malaise/fatigue.   HENT: Negative for nosebleeds.    Eyes: Negative for redness and visual disturbance.   Cardiovascular: Negative for orthopnea, palpitations and paroxysmal nocturnal dyspnea.   Respiratory: Negative for cough, snoring, sputum production and wheezing.    Hematologic/Lymphatic: Negative for bleeding problem. Bruises/bleeds easily.   Skin: Negative for flushing, itching and rash.   Musculoskeletal: Negative for falls, joint pain and muscle cramps.   Gastrointestinal: Negative for abdominal pain, diarrhea, heartburn, nausea and vomiting.   Genitourinary: Negative for hematuria.   Neurological: Negative for excessive daytime sleepiness, dizziness, headaches, tremors and weakness.   Psychiatric/Behavioral: Negative for substance abuse. The patient is not nervous/anxious.           Objective:   /82   Pulse (!) 49   Ht 177.8 cm (70\")   Wt 80.7 kg (178 lb)   SpO2 96%   BMI 25.54 kg/m²         Vitals reviewed.   Constitutional:       Appearance: Healthy appearance. Well-developed and not in distress.   Eyes:      Conjunctiva/sclera: Conjunctivae normal.      Pupils: Pupils are equal, round, and reactive to light.   HENT:      Head: Normocephalic and atraumatic.    Mouth/Throat:      Pharynx: Oropharynx is clear.   Neck:      Thyroid: Thyroid normal. No thyromegaly.      Vascular: Normal carotid pulses. No carotid bruit or JVD. JVD normal.      Lymphadenopathy: No cervical adenopathy.   Pulmonary:      Effort: No respiratory distress.      Breath " sounds: No wheezing. No rales.   Chest:      Chest wall: Not tender to palpatation.   Cardiovascular:      Normal rate. Regular rhythm.      No gallop.   Pulses:     Carotid: 2+ bilaterally.     Dorsalis pedis: 2+ bilaterally.     Posterior tibial: 2+ bilaterally.  Abdominal:      General: There is no distension or abdominal bruit.      Palpations: There is no abdominal mass.      Tenderness: There is no abdominal tenderness. There is no rebound.   Musculoskeletal:         General: No tenderness or deformity.      Extremities: No clubbing present.Skin:     General: Skin is warm and dry. There is no cyanosis.      Findings: No rash.   Neurological:      Mental Status: Alert, oriented to person, place, and time and oriented to person, place and time.           ECG 12 Lead    Date/Time: 10/6/2021 10:06 AM  Performed by: Josh Olvera MD  Authorized by: Josh Olvera MD   Comparison: not compared with previous ECG   Previous ECG: no previous ECG available  Rhythm: sinus rhythm  Other findings: left ventricular hypertrophy    Clinical impression: abnormal EKG  Comments: Left axis deviation                  Assessment:   Assessment/Plan      Diagnoses and all orders for this visit:    1. Carotid artery stenosis, symptomatic, left (Primary)  -     ECG 12 Lead    2. Hyperlipidemia, unspecified hyperlipidemia type      1.  70% left ICA stenosis by CTA.  Symptomatic with right arm TIA occurring after her trauma to the left neck.  Probable an atheroembolic event.  2.  Dyslipidemia recently started on statin  3.  Active without any angina/dyspnea    Recommendations:  1.  Agree with aspirin and high intensity statin to keep LDL at 70  2.  Will check carotid duplex to evaluate plaque, and more adequately assess hemodynamic significance.  3.  Further recommendations of conservative therapy versus revascularization remain after the carotid duplex.  4.  Revisit annually apparent symptom change       Judith ESPINOSA  scribed portions of this dictation for Dr. Josh Olvera.   I have seen and examined the patient, I have reviewed the note, discussed the case with the advance practice clinician, made necessary changes and I agree with the final note.    Josh Olvera MD  10/06/21  10:14 EDT        Dictated utilizing Dragon dictation

## 2021-10-15 ENCOUNTER — HOSPITAL ENCOUNTER (OUTPATIENT)
Dept: CARDIOLOGY | Facility: HOSPITAL | Age: 71
Discharge: HOME | End: 2021-10-15
Admitting: INTERNAL MEDICINE

## 2021-10-15 DIAGNOSIS — I65.22 CAROTID ARTERY STENOSIS, SYMPTOMATIC, LEFT: ICD-10-CM

## 2021-10-15 DIAGNOSIS — E78.5 HYPERLIPIDEMIA, UNSPECIFIED HYPERLIPIDEMIA TYPE: ICD-10-CM

## 2021-10-15 LAB
BH CV XLRA MEAS LEFT DIST CCA EDV: 17 CM/SEC
BH CV XLRA MEAS LEFT DIST CCA PSV: 99 CM/SEC
BH CV XLRA MEAS LEFT DIST ICA EDV: 23 CM/SEC
BH CV XLRA MEAS LEFT DIST ICA PSV: 67 CM/SEC
BH CV XLRA MEAS LEFT ICA/CCA RATIO: 0.95
BH CV XLRA MEAS LEFT MID CCA EDV: 20 CM/SEC
BH CV XLRA MEAS LEFT MID CCA PSV: 110 CM/SEC
BH CV XLRA MEAS LEFT MID ICA EDV: 21 CM/SEC
BH CV XLRA MEAS LEFT MID ICA PSV: 72 CM/SEC
BH CV XLRA MEAS LEFT PROX CCA EDV: 21 CM/SEC
BH CV XLRA MEAS LEFT PROX CCA PSV: 128 CM/SEC
BH CV XLRA MEAS LEFT PROX ECA EDV: 13 CM/SEC
BH CV XLRA MEAS LEFT PROX ECA PSV: 160 CM/SEC
BH CV XLRA MEAS LEFT PROX ICA EDV: 29 CM/SEC
BH CV XLRA MEAS LEFT PROX ICA PSV: 105 CM/SEC
BH CV XLRA MEAS LEFT PROX SCLA PSV: 103 CM/SEC
BH CV XLRA MEAS LEFT VERTEBRAL A EDV: 15 CM/SEC
BH CV XLRA MEAS LEFT VERTEBRAL A PSV: 59 CM/SEC
BH CV XLRA MEAS RIGHT DIST CCA EDV: 23 CM/SEC
BH CV XLRA MEAS RIGHT DIST CCA PSV: 92 CM/SEC
BH CV XLRA MEAS RIGHT DIST ICA EDV: 28 CM/SEC
BH CV XLRA MEAS RIGHT DIST ICA PSV: 99 CM/SEC
BH CV XLRA MEAS RIGHT ICA/CCA RATIO: 0.85
BH CV XLRA MEAS RIGHT MID CCA EDV: 17 CM/SEC
BH CV XLRA MEAS RIGHT MID CCA PSV: 103 CM/SEC
BH CV XLRA MEAS RIGHT MID ICA EDV: 27 CM/SEC
BH CV XLRA MEAS RIGHT MID ICA PSV: 88 CM/SEC
BH CV XLRA MEAS RIGHT PROX CCA EDV: 16 CM/SEC
BH CV XLRA MEAS RIGHT PROX CCA PSV: 103 CM/SEC
BH CV XLRA MEAS RIGHT PROX ECA EDV: 14 CM/SEC
BH CV XLRA MEAS RIGHT PROX ECA PSV: 155 CM/SEC
BH CV XLRA MEAS RIGHT PROX ICA EDV: 20 CM/SEC
BH CV XLRA MEAS RIGHT PROX ICA PSV: 85 CM/SEC
BH CV XLRA MEAS RIGHT PROX SCLA PSV: 89 CM/SEC
BH CV XLRA MEAS RIGHT VERTEBRAL A EDV: 11 CM/SEC
BH CV XLRA MEAS RIGHT VERTEBRAL A PSV: 42 CM/SEC
LEFT ARM BP: NORMAL MMHG
RIGHT ARM BP: NORMAL MMHG

## 2021-10-15 PROCEDURE — 93880 EXTRACRANIAL BILAT STUDY: CPT

## 2021-10-15 PROCEDURE — 93880 EXTRACRANIAL BILAT STUDY: CPT | Performed by: INTERNAL MEDICINE

## 2022-08-31 DIAGNOSIS — I65.22 LEFT CAROTID ARTERY STENOSIS: ICD-10-CM

## 2022-08-31 DIAGNOSIS — E78.00 HIGH CHOLESTEROL: ICD-10-CM

## 2022-08-31 RX ORDER — ATORVASTATIN CALCIUM 40 MG/1
40 TABLET, FILM COATED ORAL NIGHTLY
Qty: 30 TABLET | Refills: 0 | Status: SHIPPED | OUTPATIENT
Start: 2022-08-31 | End: 2022-09-01

## 2022-09-01 DIAGNOSIS — I65.22 LEFT CAROTID ARTERY STENOSIS: ICD-10-CM

## 2022-09-01 DIAGNOSIS — E78.00 HIGH CHOLESTEROL: ICD-10-CM

## 2022-09-01 RX ORDER — ATORVASTATIN CALCIUM 40 MG/1
40 TABLET, FILM COATED ORAL NIGHTLY
Qty: 30 TABLET | Refills: 0 | Status: SHIPPED | OUTPATIENT
Start: 2022-09-01 | End: 2022-09-15

## 2022-09-01 RX ORDER — ATORVASTATIN CALCIUM 40 MG/1
40 TABLET, FILM COATED ORAL NIGHTLY
Qty: 90 TABLET | OUTPATIENT
Start: 2022-09-01

## 2022-09-05 DIAGNOSIS — E78.00 HIGH CHOLESTEROL: ICD-10-CM

## 2022-09-05 DIAGNOSIS — I65.22 LEFT CAROTID ARTERY STENOSIS: ICD-10-CM

## 2022-09-06 NOTE — TELEPHONE ENCOUNTER
LVM FOR PT TO CALL OFFICE AND SCHEDULE APPT. NEXT AVAILABLE IS FINE ONCE SCHEDULED PLEASE FORWARD APPT DATE AND TIME TO CLINICAL POOL FOR A TEMPORARY REFILL TO BE SENT IN FOR PT.  HUB CAN SCHEDULE PLEASE

## 2022-09-15 RX ORDER — ATORVASTATIN CALCIUM 40 MG/1
40 TABLET, FILM COATED ORAL NIGHTLY
Qty: 30 TABLET | Refills: 0 | Status: SHIPPED | OUTPATIENT
Start: 2022-09-15 | End: 2022-10-12

## 2022-10-11 DIAGNOSIS — E78.00 HIGH CHOLESTEROL: ICD-10-CM

## 2022-10-11 DIAGNOSIS — I65.22 LEFT CAROTID ARTERY STENOSIS: ICD-10-CM

## 2022-10-12 RX ORDER — ATORVASTATIN CALCIUM 40 MG/1
40 TABLET, FILM COATED ORAL NIGHTLY
Qty: 90 TABLET | Refills: 0 | Status: SHIPPED | OUTPATIENT
Start: 2022-10-12 | End: 2022-10-19 | Stop reason: SDUPTHER

## 2022-10-19 ENCOUNTER — OFFICE VISIT (OUTPATIENT)
Dept: FAMILY MEDICINE CLINIC | Facility: CLINIC | Age: 72
End: 2022-10-19

## 2022-10-19 VITALS
DIASTOLIC BLOOD PRESSURE: 80 MMHG | TEMPERATURE: 98.1 F | SYSTOLIC BLOOD PRESSURE: 142 MMHG | HEART RATE: 60 BPM | OXYGEN SATURATION: 96 % | HEIGHT: 70 IN | RESPIRATION RATE: 20 BRPM | BODY MASS INDEX: 26.2 KG/M2 | WEIGHT: 183 LBS

## 2022-10-19 DIAGNOSIS — E78.00 HIGH CHOLESTEROL: Primary | ICD-10-CM

## 2022-10-19 DIAGNOSIS — I65.22 LEFT CAROTID ARTERY STENOSIS: ICD-10-CM

## 2022-10-19 DIAGNOSIS — Z11.59 ENCOUNTER FOR HEPATITIS C SCREENING TEST FOR LOW RISK PATIENT: ICD-10-CM

## 2022-10-19 PROCEDURE — 99213 OFFICE O/P EST LOW 20 MIN: CPT | Performed by: FAMILY MEDICINE

## 2022-10-19 RX ORDER — ATORVASTATIN CALCIUM 40 MG/1
40 TABLET, FILM COATED ORAL NIGHTLY
Qty: 90 TABLET | Refills: 3 | Status: SHIPPED | OUTPATIENT
Start: 2022-10-19 | End: 2022-11-30 | Stop reason: HOSPADM

## 2022-10-19 NOTE — PROGRESS NOTES
Chief Complaint  Follow-up and Hyperlipidemia    Subjective          Cayden Nielsen presents to [facility]  Hyperlipidemia  This is a chronic problem. The current episode started more than 1 year ago. The problem is controlled. Recent lipid tests were reviewed and are normal. He has no history of obesity. Current antihyperlipidemic treatment includes statins. The current treatment provides significant improvement of lipids. Risk factors for coronary artery disease include dyslipidemia and male sex.   He stopped taking  mg daily due to ecchymosis. He hasn't tried ASA 81 mg     Open fracture right hand 4th digit occurred 3/13/22. Slipped off from curb, fell and caught himself with right hand, resulting in injury  He completed PT and continues at home exercises to improve ROM    The following portions of the patient's history were reviewed and updated as appropriate: allergies, current medications, past family history, past medical history, past social history, past surgical history and problem list.    Objective      Physical Exam  Vitals and nursing note reviewed.   Constitutional:       General: He is not in acute distress.     Appearance: Normal appearance. He is well-developed.   HENT:      Head: Normocephalic and atraumatic.      Right Ear: External ear normal.      Left Ear: External ear normal.   Eyes:      Conjunctiva/sclera: Conjunctivae normal.   Cardiovascular:      Rate and Rhythm: Normal rate and regular rhythm.      Heart sounds: Normal heart sounds.   Pulmonary:      Effort: Pulmonary effort is normal.      Breath sounds: Normal breath sounds.   Musculoskeletal:         General: No swelling.      Comments: Right hand 4th digit: wearing finger sleeve, limited ROM due to stiffness    Neurological:      Mental Status: He is alert and oriented to person, place, and time.        Result Review :                Assessment and Plan    Diagnoses and all orders for this  visit:    1. High cholesterol (Primary)  -     Cancel: CBC & Differential  -     Cancel: Comprehensive Metabolic Panel  -     Cancel: Lipid Panel With / Chol / HDL Ratio  -     CBC & Differential  -     Comprehensive Metabolic Panel  -     Lipid Panel With / Chol / HDL Ratio  -     atorvastatin (LIPITOR) 40 MG tablet; Take 1 tablet by mouth Every Night.  Dispense: 90 tablet; Refill: 3    2. Left carotid artery stenosis  -     atorvastatin (LIPITOR) 40 MG tablet; Take 1 tablet by mouth Every Night.  Dispense: 90 tablet; Refill: 3    3. Encounter for hepatitis C screening test for low risk patient  -     Hepatitis C Antibody    Continue atorvastatin. Recommend that he take ASA 81 mg   Labs updated today      Follow Up   Return in about 1 year (around 10/19/2023) for Medicare Wellness.  Patient was given instructions and counseling regarding his condition or for health maintenance advice. Please see specific information pulled into the AVS if appropriate.

## 2022-10-20 LAB
ALBUMIN SERPL-MCNC: 4.2 G/DL (ref 3.5–5.2)
ALBUMIN/GLOB SERPL: 2 G/DL
ALP SERPL-CCNC: 137 U/L (ref 39–117)
ALT SERPL-CCNC: 19 U/L (ref 1–41)
AST SERPL-CCNC: 25 U/L (ref 1–40)
BASOPHILS # BLD AUTO: 0.06 10*3/MM3 (ref 0–0.2)
BASOPHILS NFR BLD AUTO: 0.6 % (ref 0–1.5)
BILIRUB SERPL-MCNC: 0.7 MG/DL (ref 0–1.2)
BUN SERPL-MCNC: 19 MG/DL (ref 8–23)
BUN/CREAT SERPL: 18.1 (ref 7–25)
CALCIUM SERPL-MCNC: 8.9 MG/DL (ref 8.6–10.5)
CHLORIDE SERPL-SCNC: 104 MMOL/L (ref 98–107)
CHOLEST SERPL-MCNC: 124 MG/DL (ref 0–200)
CHOLEST/HDLC SERPL: 2.48 {RATIO}
CO2 SERPL-SCNC: 26.7 MMOL/L (ref 22–29)
CREAT SERPL-MCNC: 1.05 MG/DL (ref 0.76–1.27)
EGFRCR SERPLBLD CKD-EPI 2021: 75.4 ML/MIN/1.73
EOSINOPHIL # BLD AUTO: 0.64 10*3/MM3 (ref 0–0.4)
EOSINOPHIL NFR BLD AUTO: 6.6 % (ref 0.3–6.2)
ERYTHROCYTE [DISTWIDTH] IN BLOOD BY AUTOMATED COUNT: 12.8 % (ref 12.3–15.4)
GLOBULIN SER CALC-MCNC: 2.1 GM/DL
GLUCOSE SERPL-MCNC: 94 MG/DL (ref 65–99)
HCT VFR BLD AUTO: 43.4 % (ref 37.5–51)
HCV AB S/CO SERPL IA: <0.1 S/CO RATIO (ref 0–0.9)
HDLC SERPL-MCNC: 50 MG/DL (ref 40–60)
HGB BLD-MCNC: 14.7 G/DL (ref 13–17.7)
IMM GRANULOCYTES # BLD AUTO: 0.03 10*3/MM3 (ref 0–0.05)
IMM GRANULOCYTES NFR BLD AUTO: 0.3 % (ref 0–0.5)
LDLC SERPL CALC-MCNC: 61 MG/DL (ref 0–100)
LYMPHOCYTES # BLD AUTO: 1.3 10*3/MM3 (ref 0.7–3.1)
LYMPHOCYTES NFR BLD AUTO: 13.4 % (ref 19.6–45.3)
MCH RBC QN AUTO: 29.5 PG (ref 26.6–33)
MCHC RBC AUTO-ENTMCNC: 33.9 G/DL (ref 31.5–35.7)
MCV RBC AUTO: 87.1 FL (ref 79–97)
MONOCYTES # BLD AUTO: 0.65 10*3/MM3 (ref 0.1–0.9)
MONOCYTES NFR BLD AUTO: 6.7 % (ref 5–12)
NEUTROPHILS # BLD AUTO: 7.02 10*3/MM3 (ref 1.7–7)
NEUTROPHILS NFR BLD AUTO: 72.4 % (ref 42.7–76)
NRBC BLD AUTO-RTO: 0 /100 WBC (ref 0–0.2)
PLATELET # BLD AUTO: 183 10*3/MM3 (ref 140–450)
POTASSIUM SERPL-SCNC: 4.4 MMOL/L (ref 3.5–5.2)
PROT SERPL-MCNC: 6.3 G/DL (ref 6–8.5)
RBC # BLD AUTO: 4.98 10*6/MM3 (ref 4.14–5.8)
SODIUM SERPL-SCNC: 141 MMOL/L (ref 136–145)
TRIGL SERPL-MCNC: 57 MG/DL (ref 0–150)
VLDLC SERPL CALC-MCNC: 13 MG/DL (ref 5–40)
WBC # BLD AUTO: 9.7 10*3/MM3 (ref 3.4–10.8)

## 2022-11-22 ENCOUNTER — HOSPITAL ENCOUNTER (INPATIENT)
Facility: HOSPITAL | Age: 72
LOS: 7 days | Discharge: HOME | End: 2022-11-30
Attending: EMERGENCY MEDICINE | Admitting: INTERNAL MEDICINE

## 2022-11-22 ENCOUNTER — APPOINTMENT (OUTPATIENT)
Dept: GENERAL RADIOLOGY | Facility: HOSPITAL | Age: 72
End: 2022-11-22

## 2022-11-22 DIAGNOSIS — I21.4 NSTEMI (NON-ST ELEVATED MYOCARDIAL INFARCTION): Primary | ICD-10-CM

## 2022-11-22 DIAGNOSIS — Z86.39 HISTORY OF HYPERLIPIDEMIA: ICD-10-CM

## 2022-11-22 DIAGNOSIS — I21.4 NSTEMI (NON-ST ELEVATED MYOCARDIAL INFARCTION): ICD-10-CM

## 2022-11-22 DIAGNOSIS — I20.0 UNSTABLE ANGINA: ICD-10-CM

## 2022-11-22 DIAGNOSIS — Z86.79 HISTORY OF ATHEROSCLEROTIC CARDIOVASCULAR DISEASE: ICD-10-CM

## 2022-11-22 DIAGNOSIS — I16.1 HYPERTENSIVE EMERGENCY: ICD-10-CM

## 2022-11-22 DIAGNOSIS — I25.10 CORONARY ARTERY CALCIFICATION: ICD-10-CM

## 2022-11-22 DIAGNOSIS — I25.84 CORONARY ARTERY CALCIFICATION: ICD-10-CM

## 2022-11-22 PROBLEM — E78.5 HLD (HYPERLIPIDEMIA): Status: ACTIVE | Noted: 2022-11-22

## 2022-11-22 PROBLEM — I16.0 HYPERTENSIVE URGENCY: Status: ACTIVE | Noted: 2022-11-22

## 2022-11-22 LAB
ALBUMIN SERPL-MCNC: 4 G/DL (ref 3.5–5.2)
ALBUMIN/GLOB SERPL: 1.3 G/DL
ALP SERPL-CCNC: 134 U/L (ref 39–117)
ALT SERPL W P-5'-P-CCNC: 16 U/L (ref 1–41)
ANION GAP SERPL CALCULATED.3IONS-SCNC: 10 MMOL/L (ref 5–15)
APTT PPP: 29.2 SECONDS (ref 60–90)
AST SERPL-CCNC: 24 U/L (ref 1–40)
BASOPHILS # BLD AUTO: 0.06 10*3/MM3 (ref 0–0.2)
BASOPHILS NFR BLD AUTO: 0.7 % (ref 0–1.5)
BILIRUB SERPL-MCNC: 0.5 MG/DL (ref 0–1.2)
BUN SERPL-MCNC: 16 MG/DL (ref 8–23)
BUN/CREAT SERPL: 18.4 (ref 7–25)
CALCIUM SPEC-SCNC: 10.1 MG/DL (ref 8.6–10.5)
CHLORIDE SERPL-SCNC: 103 MMOL/L (ref 98–107)
CO2 SERPL-SCNC: 27 MMOL/L (ref 22–29)
CREAT SERPL-MCNC: 0.87 MG/DL (ref 0.76–1.27)
DEPRECATED RDW RBC AUTO: 43.5 FL (ref 37–54)
EGFRCR SERPLBLD CKD-EPI 2021: 91.7 ML/MIN/1.73
EOSINOPHIL # BLD AUTO: 0.25 10*3/MM3 (ref 0–0.4)
EOSINOPHIL NFR BLD AUTO: 2.8 % (ref 0.3–6.2)
ERYTHROCYTE [DISTWIDTH] IN BLOOD BY AUTOMATED COUNT: 13.2 % (ref 12.3–15.4)
GLOBULIN UR ELPH-MCNC: 3.1 GM/DL
GLUCOSE SERPL-MCNC: 100 MG/DL (ref 65–99)
HCT VFR BLD AUTO: 46.7 % (ref 37.5–51)
HGB BLD-MCNC: 15.4 G/DL (ref 13–17.7)
HOLD SPECIMEN: NORMAL
HOLD SPECIMEN: NORMAL
IMM GRANULOCYTES # BLD AUTO: 0.05 10*3/MM3 (ref 0–0.05)
IMM GRANULOCYTES NFR BLD AUTO: 0.6 % (ref 0–0.5)
INR PPP: 0.94 (ref 0.84–1.13)
LIPASE SERPL-CCNC: 30 U/L (ref 13–60)
LYMPHOCYTES # BLD AUTO: 1.72 10*3/MM3 (ref 0.7–3.1)
LYMPHOCYTES NFR BLD AUTO: 19.2 % (ref 19.6–45.3)
MCH RBC QN AUTO: 29.6 PG (ref 26.6–33)
MCHC RBC AUTO-ENTMCNC: 33 G/DL (ref 31.5–35.7)
MCV RBC AUTO: 89.6 FL (ref 79–97)
MONOCYTES # BLD AUTO: 0.54 10*3/MM3 (ref 0.1–0.9)
MONOCYTES NFR BLD AUTO: 6 % (ref 5–12)
NEUTROPHILS NFR BLD AUTO: 6.33 10*3/MM3 (ref 1.7–7)
NEUTROPHILS NFR BLD AUTO: 70.7 % (ref 42.7–76)
NRBC BLD AUTO-RTO: 0 /100 WBC (ref 0–0.2)
NT-PROBNP SERPL-MCNC: 245 PG/ML (ref 0–900)
PLATELET # BLD AUTO: 213 10*3/MM3 (ref 140–450)
PMV BLD AUTO: 9.8 FL (ref 6–12)
POTASSIUM SERPL-SCNC: 3.9 MMOL/L (ref 3.5–5.2)
PROT SERPL-MCNC: 7.1 G/DL (ref 6–8.5)
PROTHROMBIN TIME: 12.4 SECONDS (ref 11.4–14.4)
QT INTERVAL: 376 MS
QT INTERVAL: 400 MS
QTC INTERVAL: 362 MS
QTC INTERVAL: 368 MS
RBC # BLD AUTO: 5.21 10*6/MM3 (ref 4.14–5.8)
SODIUM SERPL-SCNC: 140 MMOL/L (ref 136–145)
TROPONIN T SERPL-MCNC: 0.01 NG/ML (ref 0–0.03)
WBC NRBC COR # BLD: 8.95 10*3/MM3 (ref 3.4–10.8)
WHOLE BLOOD HOLD COAG: NORMAL
WHOLE BLOOD HOLD SPECIMEN: NORMAL

## 2022-11-22 PROCEDURE — 84484 ASSAY OF TROPONIN QUANT: CPT

## 2022-11-22 PROCEDURE — 71045 X-RAY EXAM CHEST 1 VIEW: CPT

## 2022-11-22 PROCEDURE — 83690 ASSAY OF LIPASE: CPT

## 2022-11-22 PROCEDURE — 99284 EMERGENCY DEPT VISIT MOD MDM: CPT

## 2022-11-22 PROCEDURE — 25010000002 MORPHINE PER 10 MG: Performed by: INTERNAL MEDICINE

## 2022-11-22 PROCEDURE — 85025 COMPLETE CBC W/AUTO DIFF WBC: CPT

## 2022-11-22 PROCEDURE — 80053 COMPREHEN METABOLIC PANEL: CPT

## 2022-11-22 PROCEDURE — 84484 ASSAY OF TROPONIN QUANT: CPT | Performed by: EMERGENCY MEDICINE

## 2022-11-22 PROCEDURE — 85610 PROTHROMBIN TIME: CPT | Performed by: NURSE PRACTITIONER

## 2022-11-22 PROCEDURE — 85025 COMPLETE CBC W/AUTO DIFF WBC: CPT | Performed by: NURSE PRACTITIONER

## 2022-11-22 PROCEDURE — 25010000002 MORPHINE PER 10 MG: Performed by: EMERGENCY MEDICINE

## 2022-11-22 PROCEDURE — 93005 ELECTROCARDIOGRAM TRACING: CPT | Performed by: EMERGENCY MEDICINE

## 2022-11-22 PROCEDURE — 99223 1ST HOSP IP/OBS HIGH 75: CPT | Performed by: INTERNAL MEDICINE

## 2022-11-22 PROCEDURE — G0378 HOSPITAL OBSERVATION PER HR: HCPCS

## 2022-11-22 PROCEDURE — 85730 THROMBOPLASTIN TIME PARTIAL: CPT | Performed by: NURSE PRACTITIONER

## 2022-11-22 PROCEDURE — 86901 BLOOD TYPING SEROLOGIC RH(D): CPT

## 2022-11-22 PROCEDURE — 36415 COLL VENOUS BLD VENIPUNCTURE: CPT

## 2022-11-22 PROCEDURE — 83880 ASSAY OF NATRIURETIC PEPTIDE: CPT

## 2022-11-22 PROCEDURE — 93010 ELECTROCARDIOGRAM REPORT: CPT | Performed by: INTERNAL MEDICINE

## 2022-11-22 PROCEDURE — 86900 BLOOD TYPING SEROLOGIC ABO: CPT

## 2022-11-22 PROCEDURE — 85520 HEPARIN ASSAY: CPT | Performed by: NURSE PRACTITIONER

## 2022-11-22 PROCEDURE — 93005 ELECTROCARDIOGRAM TRACING: CPT

## 2022-11-22 RX ORDER — MORPHINE SULFATE 4 MG/ML
4 INJECTION, SOLUTION INTRAMUSCULAR; INTRAVENOUS ONCE
Status: COMPLETED | OUTPATIENT
Start: 2022-11-22 | End: 2022-11-22

## 2022-11-22 RX ORDER — ASPIRIN 81 MG/1
324 TABLET, CHEWABLE ORAL ONCE
Status: COMPLETED | OUTPATIENT
Start: 2022-11-22 | End: 2022-11-22

## 2022-11-22 RX ORDER — HEPARIN SODIUM 1000 [USP'U]/ML
2000 INJECTION, SOLUTION INTRAVENOUS; SUBCUTANEOUS AS NEEDED
Status: DISCONTINUED | OUTPATIENT
Start: 2022-11-22 | End: 2022-11-22 | Stop reason: SDUPTHER

## 2022-11-22 RX ORDER — ACETAMINOPHEN 160 MG/5ML
650 SOLUTION ORAL EVERY 4 HOURS PRN
Status: DISCONTINUED | OUTPATIENT
Start: 2022-11-22 | End: 2022-11-23 | Stop reason: SDUPTHER

## 2022-11-22 RX ORDER — HEPARIN SODIUM 1000 [USP'U]/ML
4000 INJECTION, SOLUTION INTRAVENOUS; SUBCUTANEOUS ONCE
Status: COMPLETED | OUTPATIENT
Start: 2022-11-23 | End: 2022-11-23

## 2022-11-22 RX ORDER — MORPHINE SULFATE 4 MG/ML
3 INJECTION, SOLUTION INTRAMUSCULAR; INTRAVENOUS ONCE
Status: COMPLETED | OUTPATIENT
Start: 2022-11-22 | End: 2022-11-22

## 2022-11-22 RX ORDER — HEPARIN SODIUM 10000 [USP'U]/100ML
12 INJECTION, SOLUTION INTRAVENOUS
Status: DISCONTINUED | OUTPATIENT
Start: 2022-11-23 | End: 2022-11-23

## 2022-11-22 RX ORDER — SODIUM CHLORIDE 0.9 % (FLUSH) 0.9 %
10 SYRINGE (ML) INJECTION AS NEEDED
Status: DISCONTINUED | OUTPATIENT
Start: 2022-11-22 | End: 2022-11-25

## 2022-11-22 RX ORDER — NITROGLYCERIN 20 MG/100ML
5-200 INJECTION INTRAVENOUS
Status: DISCONTINUED | OUTPATIENT
Start: 2022-11-22 | End: 2022-11-23

## 2022-11-22 RX ORDER — ACETAMINOPHEN 325 MG/1
650 TABLET ORAL EVERY 4 HOURS PRN
Status: DISCONTINUED | OUTPATIENT
Start: 2022-11-22 | End: 2022-11-23 | Stop reason: SDUPTHER

## 2022-11-22 RX ORDER — SODIUM CHLORIDE 9 MG/ML
40 INJECTION, SOLUTION INTRAVENOUS AS NEEDED
Status: DISCONTINUED | OUTPATIENT
Start: 2022-11-22 | End: 2022-11-25

## 2022-11-22 RX ORDER — HEPARIN SODIUM 1000 [USP'U]/ML
4000 INJECTION, SOLUTION INTRAVENOUS; SUBCUTANEOUS AS NEEDED
Status: DISCONTINUED | OUTPATIENT
Start: 2022-11-22 | End: 2022-11-22 | Stop reason: SDUPTHER

## 2022-11-22 RX ORDER — MORPHINE SULFATE 2 MG/ML
1 INJECTION, SOLUTION INTRAMUSCULAR; INTRAVENOUS
Status: DISCONTINUED | OUTPATIENT
Start: 2022-11-22 | End: 2022-11-23

## 2022-11-22 RX ORDER — ATORVASTATIN CALCIUM 40 MG/1
40 TABLET, FILM COATED ORAL NIGHTLY
Status: DISCONTINUED | OUTPATIENT
Start: 2022-11-23 | End: 2022-11-23

## 2022-11-22 RX ORDER — ACETAMINOPHEN 650 MG/1
650 SUPPOSITORY RECTAL EVERY 4 HOURS PRN
Status: DISCONTINUED | OUTPATIENT
Start: 2022-11-22 | End: 2022-11-23 | Stop reason: SDUPTHER

## 2022-11-22 RX ORDER — SODIUM CHLORIDE 0.9 % (FLUSH) 0.9 %
10 SYRINGE (ML) INJECTION EVERY 12 HOURS SCHEDULED
Status: DISCONTINUED | OUTPATIENT
Start: 2022-11-23 | End: 2022-11-25

## 2022-11-22 RX ORDER — NALOXONE HCL 0.4 MG/ML
0.4 VIAL (ML) INJECTION
Status: DISCONTINUED | OUTPATIENT
Start: 2022-11-22 | End: 2022-11-23

## 2022-11-22 RX ADMIN — MORPHINE SULFATE 4 MG: 4 INJECTION, SOLUTION INTRAMUSCULAR; INTRAVENOUS at 23:10

## 2022-11-22 RX ADMIN — NITROGLYCERIN 5 MCG/MIN: 20 INJECTION INTRAVENOUS at 22:30

## 2022-11-22 RX ADMIN — ASPIRIN 81 MG CHEWABLE TABLET 324 MG: 81 TABLET CHEWABLE at 22:30

## 2022-11-22 RX ADMIN — MORPHINE SULFATE 3 MG: 4 INJECTION, SOLUTION INTRAMUSCULAR; INTRAVENOUS at 22:28

## 2022-11-23 ENCOUNTER — APPOINTMENT (OUTPATIENT)
Dept: CT IMAGING | Facility: HOSPITAL | Age: 72
End: 2022-11-23

## 2022-11-23 ENCOUNTER — APPOINTMENT (OUTPATIENT)
Dept: CARDIOLOGY | Facility: HOSPITAL | Age: 72
End: 2022-11-23

## 2022-11-23 PROBLEM — I21.4 NSTEMI (NON-ST ELEVATED MYOCARDIAL INFARCTION): Status: ACTIVE | Noted: 2022-11-23

## 2022-11-23 LAB
ABO GROUP BLD: NORMAL
ABO GROUP BLD: NORMAL
ANION GAP SERPL CALCULATED.3IONS-SCNC: 10 MMOL/L (ref 5–15)
ASCENDING AORTA: 3.7 CM
BASOPHILS # BLD AUTO: 0.07 10*3/MM3 (ref 0–0.2)
BASOPHILS NFR BLD AUTO: 0.5 % (ref 0–1.5)
BH CV ECHO MEAS - AO MAX PG: 5.5 MMHG
BH CV ECHO MEAS - AO MEAN PG: 3 MMHG
BH CV ECHO MEAS - AO ROOT DIAM: 3.3 CM
BH CV ECHO MEAS - AO V2 MAX: 117 CM/SEC
BH CV ECHO MEAS - AO V2 VTI: 26.3 CM
BH CV ECHO MEAS - AVA(I,D): 2.44 CM2
BH CV ECHO MEAS - EDV(CUBED): 140.6 ML
BH CV ECHO MEAS - EDV(MOD-SP2): 84.6 ML
BH CV ECHO MEAS - EDV(MOD-SP4): 110 ML
BH CV ECHO MEAS - EF(MOD-BP): 58.8 %
BH CV ECHO MEAS - EF(MOD-SP2): 60.9 %
BH CV ECHO MEAS - EF(MOD-SP4): 57.8 %
BH CV ECHO MEAS - ESV(CUBED): 24.4 ML
BH CV ECHO MEAS - ESV(MOD-SP2): 33.1 ML
BH CV ECHO MEAS - ESV(MOD-SP4): 46.4 ML
BH CV ECHO MEAS - FS: 44.2 %
BH CV ECHO MEAS - IVS/LVPW: 1 CM
BH CV ECHO MEAS - IVSD: 1 CM
BH CV ECHO MEAS - LA DIMENSION: 3.2 CM
BH CV ECHO MEAS - LAT PEAK E' VEL: 11.3 CM/SEC
BH CV ECHO MEAS - LV MASS(C)D: 194.2 GRAMS
BH CV ECHO MEAS - LV MAX PG: 2.8 MMHG
BH CV ECHO MEAS - LV MEAN PG: 1 MMHG
BH CV ECHO MEAS - LV V1 MAX: 83.3 CM/SEC
BH CV ECHO MEAS - LV V1 VTI: 20.4 CM
BH CV ECHO MEAS - LVIDD: 5.2 CM
BH CV ECHO MEAS - LVIDS: 2.9 CM
BH CV ECHO MEAS - LVOT AREA: 3.1 CM2
BH CV ECHO MEAS - LVOT DIAM: 2 CM
BH CV ECHO MEAS - LVPWD: 1 CM
BH CV ECHO MEAS - MED PEAK E' VEL: 8.6 CM/SEC
BH CV ECHO MEAS - MV A MAX VEL: 95.9 CM/SEC
BH CV ECHO MEAS - MV DEC TIME: 0.28 MSEC
BH CV ECHO MEAS - MV E MAX VEL: 63.9 CM/SEC
BH CV ECHO MEAS - MV E/A: 0.67
BH CV ECHO MEAS - MV MAX PG: 4 MMHG
BH CV ECHO MEAS - MV MEAN PG: 1 MMHG
BH CV ECHO MEAS - MV V2 VTI: 29.8 CM
BH CV ECHO MEAS - MVA(VTI): 2.15 CM2
BH CV ECHO MEAS - PA ACC TIME: 0.25 SEC
BH CV ECHO MEAS - PA PR(ACCEL): -35.3 MMHG
BH CV ECHO MEAS - PA V2 MAX: 104 CM/SEC
BH CV ECHO MEAS - SV(LVOT): 64.1 ML
BH CV ECHO MEAS - SV(MOD-SP2): 51.5 ML
BH CV ECHO MEAS - SV(MOD-SP4): 63.6 ML
BH CV ECHO MEAS - TAPSE (>1.6): 1.69 CM
BH CV ECHO MEASUREMENTS AVERAGE E/E' RATIO: 6.42
BH CV VAS BP LEFT ARM: NORMAL MMHG
BH CV XLRA - RV BASE: 2.9 CM
BH CV XLRA - RV LENGTH: 5.7 CM
BH CV XLRA - RV MID: 1.9 CM
BH CV XLRA - TDI S': 12 CM/SEC
BILIRUB UR QL STRIP: NEGATIVE
BLD GP AB SCN SERPL QL: NEGATIVE
BUN SERPL-MCNC: 16 MG/DL (ref 8–23)
BUN/CREAT SERPL: 18.2 (ref 7–25)
CALCIUM SPEC-SCNC: 9.2 MG/DL (ref 8.6–10.5)
CHLORIDE SERPL-SCNC: 101 MMOL/L (ref 98–107)
CHOLEST SERPL-MCNC: 113 MG/DL (ref 0–200)
CK MB SERPL-CCNC: 188.3 NG/ML
CK SERPL-CCNC: 1444 U/L (ref 20–200)
CLARITY UR: CLEAR
CO2 SERPL-SCNC: 26 MMOL/L (ref 22–29)
COLOR UR: YELLOW
CREAT SERPL-MCNC: 0.88 MG/DL (ref 0.76–1.27)
DEPRECATED RDW RBC AUTO: 43.3 FL (ref 37–54)
EGFRCR SERPLBLD CKD-EPI 2021: 91.4 ML/MIN/1.73
EOSINOPHIL # BLD AUTO: 0.15 10*3/MM3 (ref 0–0.4)
EOSINOPHIL NFR BLD AUTO: 1.1 % (ref 0.3–6.2)
ERYTHROCYTE [DISTWIDTH] IN BLOOD BY AUTOMATED COUNT: 13.2 % (ref 12.3–15.4)
GLUCOSE BLDC GLUCOMTR-MCNC: 196 MG/DL (ref 70–130)
GLUCOSE SERPL-MCNC: 158 MG/DL (ref 65–99)
GLUCOSE UR STRIP-MCNC: NEGATIVE MG/DL
HCT VFR BLD AUTO: 43.7 % (ref 37.5–51)
HDLC SERPL-MCNC: 49 MG/DL (ref 40–60)
HGB BLD-MCNC: 14.2 G/DL (ref 13–17.7)
HGB UR QL STRIP.AUTO: NEGATIVE
HOLD SPECIMEN: NORMAL
IMM GRANULOCYTES # BLD AUTO: 0.08 10*3/MM3 (ref 0–0.05)
IMM GRANULOCYTES NFR BLD AUTO: 0.6 % (ref 0–0.5)
KETONES UR QL STRIP: ABNORMAL
LDLC SERPL CALC-MCNC: 55 MG/DL (ref 0–100)
LDLC/HDLC SERPL: 1.18 {RATIO}
LEFT ATRIUM VOLUME INDEX: 20.3 ML/M2
LEUKOCYTE ESTERASE UR QL STRIP.AUTO: NEGATIVE
LYMPHOCYTES # BLD AUTO: 1.54 10*3/MM3 (ref 0.7–3.1)
LYMPHOCYTES NFR BLD AUTO: 10.8 % (ref 19.6–45.3)
MAGNESIUM SERPL-MCNC: 1.9 MG/DL (ref 1.6–2.4)
MAXIMAL PREDICTED HEART RATE: 148 BPM
MCH RBC QN AUTO: 29.3 PG (ref 26.6–33)
MCHC RBC AUTO-ENTMCNC: 32.5 G/DL (ref 31.5–35.7)
MCV RBC AUTO: 90.3 FL (ref 79–97)
MONOCYTES # BLD AUTO: 0.66 10*3/MM3 (ref 0.1–0.9)
MONOCYTES NFR BLD AUTO: 4.6 % (ref 5–12)
NEUTROPHILS NFR BLD AUTO: 11.75 10*3/MM3 (ref 1.7–7)
NEUTROPHILS NFR BLD AUTO: 82.4 % (ref 42.7–76)
NITRITE UR QL STRIP: NEGATIVE
NRBC BLD AUTO-RTO: 0 /100 WBC (ref 0–0.2)
PH UR STRIP.AUTO: <=5 [PH] (ref 5–8)
PLATELET # BLD AUTO: 218 10*3/MM3 (ref 140–450)
PMV BLD AUTO: 9.7 FL (ref 6–12)
POTASSIUM SERPL-SCNC: 3.8 MMOL/L (ref 3.5–5.2)
PROT UR QL STRIP: NEGATIVE
QT INTERVAL: 424 MS
QT INTERVAL: 428 MS
QT INTERVAL: 436 MS
QTC INTERVAL: 389 MS
QTC INTERVAL: 394 MS
QTC INTERVAL: 397 MS
RBC # BLD AUTO: 4.84 10*6/MM3 (ref 4.14–5.8)
RH BLD: POSITIVE
RH BLD: POSITIVE
SODIUM SERPL-SCNC: 137 MMOL/L (ref 136–145)
SP GR UR STRIP: 1.08 (ref 1–1.03)
STRESS TARGET HR: 126 BPM
T&S EXPIRATION DATE: NORMAL
TRIGL SERPL-MCNC: 31 MG/DL (ref 0–150)
TROPONIN T SERPL-MCNC: 0.05 NG/ML (ref 0–0.03)
TROPONIN T SERPL-MCNC: 0.25 NG/ML (ref 0–0.03)
TROPONIN T SERPL-MCNC: 3.39 NG/ML (ref 0–0.03)
UFH PPP CHRO-ACNC: 0.1 IU/ML (ref 0.3–0.7)
UFH PPP CHRO-ACNC: >1.1 IU/ML (ref 0.3–0.7)
UROBILINOGEN UR QL STRIP: ABNORMAL
VLDLC SERPL-MCNC: 9 MG/DL (ref 5–40)
WBC NRBC COR # BLD: 14.25 10*3/MM3 (ref 3.4–10.8)

## 2022-11-23 PROCEDURE — 85347 COAGULATION TIME ACTIVATED: CPT

## 2022-11-23 PROCEDURE — 25010000002 HEPARIN (PORCINE) PER 1000 UNITS: Performed by: NURSE PRACTITIONER

## 2022-11-23 PROCEDURE — 82550 ASSAY OF CK (CPK): CPT | Performed by: STUDENT IN AN ORGANIZED HEALTH CARE EDUCATION/TRAINING PROGRAM

## 2022-11-23 PROCEDURE — 86900 BLOOD TYPING SEROLOGIC ABO: CPT | Performed by: PHYSICIAN ASSISTANT

## 2022-11-23 PROCEDURE — 92920 PRQ TRLUML C ANGIOP 1ART&/BR: CPT | Performed by: INTERNAL MEDICINE

## 2022-11-23 PROCEDURE — 84484 ASSAY OF TROPONIN QUANT: CPT | Performed by: STUDENT IN AN ORGANIZED HEALTH CARE EDUCATION/TRAINING PROGRAM

## 2022-11-23 PROCEDURE — 80061 LIPID PANEL: CPT | Performed by: NURSE PRACTITIONER

## 2022-11-23 PROCEDURE — B2111ZZ FLUOROSCOPY OF MULTIPLE CORONARY ARTERIES USING LOW OSMOLAR CONTRAST: ICD-10-PCS | Performed by: INTERNAL MEDICINE

## 2022-11-23 PROCEDURE — 82553 CREATINE MB FRACTION: CPT | Performed by: STUDENT IN AN ORGANIZED HEALTH CARE EDUCATION/TRAINING PROGRAM

## 2022-11-23 PROCEDURE — C1894 INTRO/SHEATH, NON-LASER: HCPCS | Performed by: INTERNAL MEDICINE

## 2022-11-23 PROCEDURE — C1725 CATH, TRANSLUMIN NON-LASER: HCPCS | Performed by: INTERNAL MEDICINE

## 2022-11-23 PROCEDURE — 25010000002 MORPHINE PER 10 MG: Performed by: INTERNAL MEDICINE

## 2022-11-23 PROCEDURE — 4A023N7 MEASUREMENT OF CARDIAC SAMPLING AND PRESSURE, LEFT HEART, PERCUTANEOUS APPROACH: ICD-10-PCS | Performed by: INTERNAL MEDICINE

## 2022-11-23 PROCEDURE — 25010000002 HEPARIN (PORCINE) 25000-0.45 UT/250ML-% SOLUTION: Performed by: NURSE PRACTITIONER

## 2022-11-23 PROCEDURE — C1769 GUIDE WIRE: HCPCS | Performed by: INTERNAL MEDICINE

## 2022-11-23 PROCEDURE — 25010000002 MORPHINE PER 10 MG: Performed by: NURSE PRACTITIONER

## 2022-11-23 PROCEDURE — 0 IOPAMIDOL PER 1 ML: Performed by: INTERNAL MEDICINE

## 2022-11-23 PROCEDURE — 93306 TTE W/DOPPLER COMPLETE: CPT

## 2022-11-23 PROCEDURE — 93458 L HRT ARTERY/VENTRICLE ANGIO: CPT | Performed by: INTERNAL MEDICINE

## 2022-11-23 PROCEDURE — 80048 BASIC METABOLIC PNL TOTAL CA: CPT | Performed by: NURSE PRACTITIONER

## 2022-11-23 PROCEDURE — 84484 ASSAY OF TROPONIN QUANT: CPT | Performed by: INTERNAL MEDICINE

## 2022-11-23 PROCEDURE — 25010000002 HEPARIN (PORCINE) PER 1000 UNITS: Performed by: PHYSICIAN ASSISTANT

## 2022-11-23 PROCEDURE — 25010000002 MIDAZOLAM PER 1 MG: Performed by: INTERNAL MEDICINE

## 2022-11-23 PROCEDURE — 83735 ASSAY OF MAGNESIUM: CPT | Performed by: NURSE PRACTITIONER

## 2022-11-23 PROCEDURE — 99222 1ST HOSP IP/OBS MODERATE 55: CPT | Performed by: STUDENT IN AN ORGANIZED HEALTH CARE EDUCATION/TRAINING PROGRAM

## 2022-11-23 PROCEDURE — 93005 ELECTROCARDIOGRAM TRACING: CPT | Performed by: INTERNAL MEDICINE

## 2022-11-23 PROCEDURE — 71275 CT ANGIOGRAPHY CHEST: CPT

## 2022-11-23 PROCEDURE — B2151ZZ FLUOROSCOPY OF LEFT HEART USING LOW OSMOLAR CONTRAST: ICD-10-PCS | Performed by: INTERNAL MEDICINE

## 2022-11-23 PROCEDURE — 86901 BLOOD TYPING SEROLOGIC RH(D): CPT | Performed by: PHYSICIAN ASSISTANT

## 2022-11-23 PROCEDURE — 25010000002 EPTIFIBATIDE 20 MG/10ML SOLUTION: Performed by: INTERNAL MEDICINE

## 2022-11-23 PROCEDURE — 25010000002 EPTIFIBATIDE PER 5 MG: Performed by: INTERNAL MEDICINE

## 2022-11-23 PROCEDURE — 86850 RBC ANTIBODY SCREEN: CPT | Performed by: PHYSICIAN ASSISTANT

## 2022-11-23 PROCEDURE — 99232 SBSQ HOSP IP/OBS MODERATE 35: CPT | Performed by: INTERNAL MEDICINE

## 2022-11-23 PROCEDURE — 99232 SBSQ HOSP IP/OBS MODERATE 35: CPT

## 2022-11-23 PROCEDURE — 25010000002 HEPARIN (PORCINE) PER 1000 UNITS: Performed by: INTERNAL MEDICINE

## 2022-11-23 PROCEDURE — 93005 ELECTROCARDIOGRAM TRACING: CPT | Performed by: NURSE PRACTITIONER

## 2022-11-23 PROCEDURE — 82962 GLUCOSE BLOOD TEST: CPT

## 2022-11-23 PROCEDURE — C1887 CATHETER, GUIDING: HCPCS | Performed by: INTERNAL MEDICINE

## 2022-11-23 PROCEDURE — 85520 HEPARIN ASSAY: CPT

## 2022-11-23 PROCEDURE — 81003 URINALYSIS AUTO W/O SCOPE: CPT | Performed by: PHYSICIAN ASSISTANT

## 2022-11-23 PROCEDURE — 86923 COMPATIBILITY TEST ELECTRIC: CPT

## 2022-11-23 PROCEDURE — 99222 1ST HOSP IP/OBS MODERATE 55: CPT | Performed by: INTERNAL MEDICINE

## 2022-11-23 PROCEDURE — 93306 TTE W/DOPPLER COMPLETE: CPT | Performed by: INTERNAL MEDICINE

## 2022-11-23 PROCEDURE — 93005 ELECTROCARDIOGRAM TRACING: CPT | Performed by: STUDENT IN AN ORGANIZED HEALTH CARE EDUCATION/TRAINING PROGRAM

## 2022-11-23 PROCEDURE — 25010000002 FENTANYL CITRATE (PF) 50 MCG/ML SOLUTION: Performed by: INTERNAL MEDICINE

## 2022-11-23 PROCEDURE — 02703ZZ DILATION OF CORONARY ARTERY, ONE ARTERY, PERCUTANEOUS APPROACH: ICD-10-PCS | Performed by: INTERNAL MEDICINE

## 2022-11-23 RX ORDER — IPRATROPIUM BROMIDE AND ALBUTEROL SULFATE 2.5; .5 MG/3ML; MG/3ML
3 SOLUTION RESPIRATORY (INHALATION) EVERY 4 HOURS PRN
Status: DISCONTINUED | OUTPATIENT
Start: 2022-11-23 | End: 2022-11-25

## 2022-11-23 RX ORDER — NITROGLYCERIN 20 MG/100ML
5-200 INJECTION INTRAVENOUS
Status: DISCONTINUED | OUTPATIENT
Start: 2022-11-23 | End: 2022-11-23

## 2022-11-23 RX ORDER — MORPHINE SULFATE 2 MG/ML
2 INJECTION, SOLUTION INTRAMUSCULAR; INTRAVENOUS EVERY 6 HOURS PRN
Status: DISCONTINUED | OUTPATIENT
Start: 2022-11-23 | End: 2022-11-25

## 2022-11-23 RX ORDER — CHLORHEXIDINE GLUCONATE 500 MG/1
1 CLOTH TOPICAL EVERY 12 HOURS PRN
Status: DISCONTINUED | OUTPATIENT
Start: 2022-11-23 | End: 2022-11-25

## 2022-11-23 RX ORDER — ASPIRIN 325 MG
325 TABLET, DELAYED RELEASE (ENTERIC COATED) ORAL DAILY
Status: DISCONTINUED | OUTPATIENT
Start: 2022-11-24 | End: 2022-11-25

## 2022-11-23 RX ORDER — NICARDIPINE HCL-0.9% SOD CHLOR 1 MG/10 ML
SYRINGE (ML) INTRAVENOUS
Status: DISCONTINUED | OUTPATIENT
Start: 2022-11-23 | End: 2022-11-23 | Stop reason: HOSPADM

## 2022-11-23 RX ORDER — ASPIRIN 81 MG/1
81 TABLET ORAL DAILY
COMMUNITY

## 2022-11-23 RX ORDER — NALOXONE HCL 0.4 MG/ML
0.4 VIAL (ML) INJECTION
Status: DISCONTINUED | OUTPATIENT
Start: 2022-11-23 | End: 2022-11-25

## 2022-11-23 RX ORDER — CLOPIDOGREL BISULFATE 75 MG/1
75 TABLET ORAL DAILY
Status: DISCONTINUED | OUTPATIENT
Start: 2022-11-24 | End: 2022-11-23

## 2022-11-23 RX ORDER — ATORVASTATIN CALCIUM 40 MG/1
80 TABLET, FILM COATED ORAL NIGHTLY
Status: DISCONTINUED | OUTPATIENT
Start: 2022-11-23 | End: 2022-11-25

## 2022-11-23 RX ORDER — CLOPIDOGREL BISULFATE 75 MG/1
600 TABLET ORAL ONCE
Status: COMPLETED | OUTPATIENT
Start: 2022-11-23 | End: 2022-11-23

## 2022-11-23 RX ORDER — EPTIFIBATIDE 0.75 MG/ML
2 INJECTION, SOLUTION INTRAVENOUS CONTINUOUS
Status: DISCONTINUED | OUTPATIENT
Start: 2022-11-23 | End: 2022-11-24

## 2022-11-23 RX ORDER — MORPHINE SULFATE 4 MG/ML
3 INJECTION, SOLUTION INTRAMUSCULAR; INTRAVENOUS ONCE
Status: COMPLETED | OUTPATIENT
Start: 2022-11-23 | End: 2022-11-23

## 2022-11-23 RX ORDER — DEXTROSE, SODIUM CHLORIDE, AND POTASSIUM CHLORIDE 5; .45; .15 G/100ML; G/100ML; G/100ML
100 INJECTION INTRAVENOUS CONTINUOUS
Status: DISCONTINUED | OUTPATIENT
Start: 2022-11-23 | End: 2022-11-25

## 2022-11-23 RX ORDER — MORPHINE SULFATE 2 MG/ML
2 INJECTION, SOLUTION INTRAMUSCULAR; INTRAVENOUS ONCE
Status: COMPLETED | OUTPATIENT
Start: 2022-11-23 | End: 2022-11-23

## 2022-11-23 RX ORDER — NITROGLYCERIN 20 MG/100ML
5-200 INJECTION INTRAVENOUS
Status: DISCONTINUED | OUTPATIENT
Start: 2022-11-23 | End: 2022-11-25

## 2022-11-23 RX ORDER — EPTIFIBATIDE 2 MG/ML
INJECTION, SOLUTION INTRAVENOUS
Status: DISCONTINUED | OUTPATIENT
Start: 2022-11-23 | End: 2022-11-23 | Stop reason: HOSPADM

## 2022-11-23 RX ORDER — HEPARIN SODIUM 1000 [USP'U]/ML
INJECTION, SOLUTION INTRAVENOUS; SUBCUTANEOUS
Status: DISCONTINUED | OUTPATIENT
Start: 2022-11-23 | End: 2022-11-23 | Stop reason: HOSPADM

## 2022-11-23 RX ORDER — MIDAZOLAM HYDROCHLORIDE 1 MG/ML
INJECTION INTRAMUSCULAR; INTRAVENOUS
Status: DISCONTINUED | OUTPATIENT
Start: 2022-11-23 | End: 2022-11-23 | Stop reason: HOSPADM

## 2022-11-23 RX ORDER — MORPHINE SULFATE 4 MG/ML
3 INJECTION, SOLUTION INTRAMUSCULAR; INTRAVENOUS
Status: DISCONTINUED | OUTPATIENT
Start: 2022-11-23 | End: 2022-11-23

## 2022-11-23 RX ORDER — HEPARIN SODIUM 10000 [USP'U]/100ML
13.1 INJECTION, SOLUTION INTRAVENOUS CONTINUOUS
Status: DISCONTINUED | OUTPATIENT
Start: 2022-11-23 | End: 2022-11-25

## 2022-11-23 RX ORDER — ATORVASTATIN CALCIUM 80 MG/1
80 TABLET, FILM COATED ORAL NIGHTLY
Qty: 90 TABLET | Refills: 3 | Status: CANCELLED | OUTPATIENT
Start: 2022-11-23

## 2022-11-23 RX ORDER — FENTANYL CITRATE 50 UG/ML
INJECTION, SOLUTION INTRAMUSCULAR; INTRAVENOUS
Status: DISCONTINUED | OUTPATIENT
Start: 2022-11-23 | End: 2022-11-23 | Stop reason: HOSPADM

## 2022-11-23 RX ORDER — EPTIFIBATIDE 2 MG/ML
INJECTION, SOLUTION INTRAVENOUS
Status: DISCONTINUED | OUTPATIENT
Start: 2022-11-23 | End: 2022-11-28

## 2022-11-23 RX ORDER — EPTIFIBATIDE 0.75 MG/ML
INJECTION, SOLUTION INTRAVENOUS
Status: COMPLETED | OUTPATIENT
Start: 2022-11-23 | End: 2022-11-23

## 2022-11-23 RX ORDER — ACETAMINOPHEN 325 MG/1
650 TABLET ORAL EVERY 4 HOURS PRN
Status: DISCONTINUED | OUTPATIENT
Start: 2022-11-23 | End: 2022-11-25

## 2022-11-23 RX ORDER — CHLORHEXIDINE GLUCONATE 0.12 MG/ML
15 RINSE ORAL EVERY 12 HOURS
Status: DISCONTINUED | OUTPATIENT
Start: 2022-11-23 | End: 2022-11-24 | Stop reason: SDUPTHER

## 2022-11-23 RX ADMIN — IOPAMIDOL 85 ML: 755 INJECTION, SOLUTION INTRAVENOUS at 21:33

## 2022-11-23 RX ADMIN — ACETAMINOPHEN 650 MG: 325 TABLET ORAL at 12:20

## 2022-11-23 RX ADMIN — NITROGLYCERIN 5 MCG/MIN: 20 INJECTION INTRAVENOUS at 17:38

## 2022-11-23 RX ADMIN — MUPIROCIN 1 APPLICATION: 20 OINTMENT TOPICAL at 21:12

## 2022-11-23 RX ADMIN — MORPHINE SULFATE 2 MG: 2 INJECTION, SOLUTION INTRAMUSCULAR; INTRAVENOUS at 19:06

## 2022-11-23 RX ADMIN — ATORVASTATIN CALCIUM 40 MG: 40 TABLET, FILM COATED ORAL at 00:24

## 2022-11-23 RX ADMIN — Medication 12.5 MG: at 21:13

## 2022-11-23 RX ADMIN — ACETAMINOPHEN 650 MG: 325 TABLET ORAL at 21:13

## 2022-11-23 RX ADMIN — CHLORHEXIDINE GLUCONATE 15 ML: 1.2 SOLUTION ORAL at 21:12

## 2022-11-23 RX ADMIN — CLOPIDOGREL BISULFATE 600 MG: 75 TABLET ORAL at 02:16

## 2022-11-23 RX ADMIN — MORPHINE SULFATE 3 MG: 4 INJECTION, SOLUTION INTRAMUSCULAR; INTRAVENOUS at 09:25

## 2022-11-23 RX ADMIN — Medication 10 ML: at 09:25

## 2022-11-23 RX ADMIN — ATORVASTATIN CALCIUM 80 MG: 40 TABLET, FILM COATED ORAL at 21:13

## 2022-11-23 RX ADMIN — MORPHINE SULFATE 3 MG: 4 INJECTION, SOLUTION INTRAMUSCULAR; INTRAVENOUS at 02:24

## 2022-11-23 RX ADMIN — EPTIFIBATIDE 2 MCG/KG/MIN: 75 INJECTION INTRAVENOUS at 23:40

## 2022-11-23 RX ADMIN — MORPHINE SULFATE 2 MG: 2 INJECTION, SOLUTION INTRAMUSCULAR; INTRAVENOUS at 12:15

## 2022-11-23 RX ADMIN — HEPARIN SODIUM 12 UNITS/KG/HR: 10000 INJECTION, SOLUTION INTRAVENOUS at 00:26

## 2022-11-23 RX ADMIN — HEPARIN SODIUM 13.1 UNITS/KG/HR: 10000 INJECTION, SOLUTION INTRAVENOUS at 21:44

## 2022-11-23 RX ADMIN — MORPHINE SULFATE 1 MG: 2 INJECTION, SOLUTION INTRAMUSCULAR; INTRAVENOUS at 00:24

## 2022-11-23 RX ADMIN — POTASSIUM CHLORIDE, DEXTROSE MONOHYDRATE AND SODIUM CHLORIDE 100 ML/HR: 150; 5; 450 INJECTION, SOLUTION INTRAVENOUS at 19:07

## 2022-11-23 RX ADMIN — Medication 10 ML: at 21:49

## 2022-11-23 RX ADMIN — HEPARIN SODIUM 4000 UNITS: 1000 INJECTION INTRAVENOUS; SUBCUTANEOUS at 00:26

## 2022-11-24 ENCOUNTER — APPOINTMENT (OUTPATIENT)
Dept: CARDIOLOGY | Facility: HOSPITAL | Age: 72
End: 2022-11-24

## 2022-11-24 ENCOUNTER — APPOINTMENT (OUTPATIENT)
Dept: GENERAL RADIOLOGY | Facility: HOSPITAL | Age: 72
End: 2022-11-24

## 2022-11-24 ENCOUNTER — APPOINTMENT (OUTPATIENT)
Dept: PULMONOLOGY | Facility: HOSPITAL | Age: 72
End: 2022-11-24

## 2022-11-24 ENCOUNTER — ANESTHESIA EVENT (OUTPATIENT)
Dept: PERIOP | Facility: HOSPITAL | Age: 72
End: 2022-11-24

## 2022-11-24 LAB
ALBUMIN SERPL-MCNC: 3.3 G/DL (ref 3.5–5.2)
ALBUMIN/GLOB SERPL: 1.1 G/DL
ALP SERPL-CCNC: 106 U/L (ref 39–117)
ALT SERPL W P-5'-P-CCNC: 32 U/L (ref 1–41)
ANION GAP SERPL CALCULATED.3IONS-SCNC: 11 MMOL/L (ref 5–15)
ANION GAP SERPL CALCULATED.3IONS-SCNC: 12 MMOL/L (ref 5–15)
APTT PPP: 46.7 SECONDS (ref 22–39)
AST SERPL-CCNC: 168 U/L (ref 1–40)
BILIRUB SERPL-MCNC: 0.6 MG/DL (ref 0–1.2)
BUN SERPL-MCNC: 14 MG/DL (ref 8–23)
BUN SERPL-MCNC: 15 MG/DL (ref 8–23)
BUN/CREAT SERPL: 15.1 (ref 7–25)
BUN/CREAT SERPL: 16.3 (ref 7–25)
CALCIUM SPEC-SCNC: 8.4 MG/DL (ref 8.6–10.5)
CALCIUM SPEC-SCNC: 8.6 MG/DL (ref 8.6–10.5)
CHLORIDE SERPL-SCNC: 100 MMOL/L (ref 98–107)
CHLORIDE SERPL-SCNC: 99 MMOL/L (ref 98–107)
CK MB SERPL-CCNC: 91.7 NG/ML
CK SERPL-CCNC: 956 U/L (ref 20–200)
CO2 SERPL-SCNC: 24 MMOL/L (ref 22–29)
CO2 SERPL-SCNC: 24 MMOL/L (ref 22–29)
CREAT SERPL-MCNC: 0.92 MG/DL (ref 0.76–1.27)
CREAT SERPL-MCNC: 0.93 MG/DL (ref 0.76–1.27)
DEPRECATED RDW RBC AUTO: 44.7 FL (ref 37–54)
DEPRECATED RDW RBC AUTO: 45.7 FL (ref 37–54)
EGFRCR SERPLBLD CKD-EPI 2021: 87.2 ML/MIN/1.73
EGFRCR SERPLBLD CKD-EPI 2021: 88.4 ML/MIN/1.73
ERYTHROCYTE [DISTWIDTH] IN BLOOD BY AUTOMATED COUNT: 13.6 % (ref 12.3–15.4)
ERYTHROCYTE [DISTWIDTH] IN BLOOD BY AUTOMATED COUNT: 13.6 % (ref 12.3–15.4)
GLOBULIN UR ELPH-MCNC: 2.9 GM/DL
GLUCOSE BLDC GLUCOMTR-MCNC: 113 MG/DL (ref 70–130)
GLUCOSE SERPL-MCNC: 118 MG/DL (ref 65–99)
GLUCOSE SERPL-MCNC: 155 MG/DL (ref 65–99)
HCT VFR BLD AUTO: 40.1 % (ref 37.5–51)
HCT VFR BLD AUTO: 40.4 % (ref 37.5–51)
HGB BLD-MCNC: 13.1 G/DL (ref 13–17.7)
HGB BLD-MCNC: 13.3 G/DL (ref 13–17.7)
INR PPP: 1.07 (ref 0.84–1.13)
MCH RBC QN AUTO: 29.4 PG (ref 26.6–33)
MCH RBC QN AUTO: 29.8 PG (ref 26.6–33)
MCHC RBC AUTO-ENTMCNC: 32.4 G/DL (ref 31.5–35.7)
MCHC RBC AUTO-ENTMCNC: 33.2 G/DL (ref 31.5–35.7)
MCV RBC AUTO: 89.9 FL (ref 79–97)
MCV RBC AUTO: 90.8 FL (ref 79–97)
PA ADP PRP-ACNC: NORMAL
PLATELET # BLD AUTO: 184 10*3/MM3 (ref 140–450)
PLATELET # BLD AUTO: 195 10*3/MM3 (ref 140–450)
PMV BLD AUTO: 10.2 FL (ref 6–12)
PMV BLD AUTO: 10.5 FL (ref 6–12)
POTASSIUM SERPL-SCNC: 4 MMOL/L (ref 3.5–5.2)
POTASSIUM SERPL-SCNC: 4.1 MMOL/L (ref 3.5–5.2)
POTASSIUM SERPL-SCNC: 4.2 MMOL/L (ref 3.5–5.2)
PROT SERPL-MCNC: 6.2 G/DL (ref 6–8.5)
PROTHROMBIN TIME: 13.8 SECONDS (ref 11.4–14.4)
QT INTERVAL: 420 MS
QTC INTERVAL: 412 MS
RBC # BLD AUTO: 4.45 10*6/MM3 (ref 4.14–5.8)
RBC # BLD AUTO: 4.46 10*6/MM3 (ref 4.14–5.8)
SODIUM SERPL-SCNC: 134 MMOL/L (ref 136–145)
SODIUM SERPL-SCNC: 136 MMOL/L (ref 136–145)
TROPONIN T SERPL-MCNC: 1.54 NG/ML (ref 0–0.03)
TROPONIN T SERPL-MCNC: 1.6 NG/ML (ref 0–0.03)
TROPONIN T SERPL-MCNC: 1.82 NG/ML (ref 0–0.03)
WBC NRBC COR # BLD: 11.9 10*3/MM3 (ref 3.4–10.8)
WBC NRBC COR # BLD: 14.37 10*3/MM3 (ref 3.4–10.8)

## 2022-11-24 PROCEDURE — 99232 SBSQ HOSP IP/OBS MODERATE 35: CPT | Performed by: INTERNAL MEDICINE

## 2022-11-24 PROCEDURE — 84484 ASSAY OF TROPONIN QUANT: CPT | Performed by: PHYSICIAN ASSISTANT

## 2022-11-24 PROCEDURE — 93880 EXTRACRANIAL BILAT STUDY: CPT

## 2022-11-24 PROCEDURE — 71045 X-RAY EXAM CHEST 1 VIEW: CPT

## 2022-11-24 PROCEDURE — 94799 UNLISTED PULMONARY SVC/PX: CPT

## 2022-11-24 PROCEDURE — 25010000002 HEPARIN (PORCINE) PER 1000 UNITS: Performed by: PHYSICIAN ASSISTANT

## 2022-11-24 PROCEDURE — 82550 ASSAY OF CK (CPK): CPT | Performed by: STUDENT IN AN ORGANIZED HEALTH CARE EDUCATION/TRAINING PROGRAM

## 2022-11-24 PROCEDURE — 99024 POSTOP FOLLOW-UP VISIT: CPT | Performed by: STUDENT IN AN ORGANIZED HEALTH CARE EDUCATION/TRAINING PROGRAM

## 2022-11-24 PROCEDURE — 85610 PROTHROMBIN TIME: CPT | Performed by: PHYSICIAN ASSISTANT

## 2022-11-24 PROCEDURE — 94010 BREATHING CAPACITY TEST: CPT

## 2022-11-24 PROCEDURE — 84132 ASSAY OF SERUM POTASSIUM: CPT | Performed by: ANESTHESIOLOGY

## 2022-11-24 PROCEDURE — 85576 BLOOD PLATELET AGGREGATION: CPT | Performed by: PHYSICIAN ASSISTANT

## 2022-11-24 PROCEDURE — 85730 THROMBOPLASTIN TIME PARTIAL: CPT | Performed by: PHYSICIAN ASSISTANT

## 2022-11-24 PROCEDURE — 85027 COMPLETE CBC AUTOMATED: CPT | Performed by: PHYSICIAN ASSISTANT

## 2022-11-24 PROCEDURE — 94010 BREATHING CAPACITY TEST: CPT | Performed by: INTERNAL MEDICINE

## 2022-11-24 PROCEDURE — 82553 CREATINE MB FRACTION: CPT | Performed by: STUDENT IN AN ORGANIZED HEALTH CARE EDUCATION/TRAINING PROGRAM

## 2022-11-24 PROCEDURE — 93010 ELECTROCARDIOGRAM REPORT: CPT | Performed by: INTERNAL MEDICINE

## 2022-11-24 PROCEDURE — 80053 COMPREHEN METABOLIC PANEL: CPT | Performed by: PHYSICIAN ASSISTANT

## 2022-11-24 PROCEDURE — 82962 GLUCOSE BLOOD TEST: CPT

## 2022-11-24 PROCEDURE — 25010000002 EPTIFIBATIDE PER 5 MG: Performed by: INTERNAL MEDICINE

## 2022-11-24 PROCEDURE — 93005 ELECTROCARDIOGRAM TRACING: CPT | Performed by: PHYSICIAN ASSISTANT

## 2022-11-24 PROCEDURE — 85027 COMPLETE CBC AUTOMATED: CPT | Performed by: INTERNAL MEDICINE

## 2022-11-24 PROCEDURE — 93880 EXTRACRANIAL BILAT STUDY: CPT | Performed by: INTERNAL MEDICINE

## 2022-11-24 RX ORDER — CHLORHEXIDINE GLUCONATE 0.12 MG/ML
15 RINSE ORAL EVERY 12 HOURS
Status: COMPLETED | OUTPATIENT
Start: 2022-11-24 | End: 2022-11-25

## 2022-11-24 RX ORDER — FENTANYL CITRATE 50 UG/ML
50 INJECTION, SOLUTION INTRAMUSCULAR; INTRAVENOUS
Status: CANCELLED | OUTPATIENT
Start: 2022-11-24

## 2022-11-24 RX ORDER — HYDROMORPHONE HYDROCHLORIDE 1 MG/ML
0.5 INJECTION, SOLUTION INTRAMUSCULAR; INTRAVENOUS; SUBCUTANEOUS
Status: CANCELLED | OUTPATIENT
Start: 2022-11-24

## 2022-11-24 RX ORDER — CHLORHEXIDINE GLUCONATE 500 MG/1
1 CLOTH TOPICAL EVERY 12 HOURS PRN
Status: DISCONTINUED | OUTPATIENT
Start: 2022-11-24 | End: 2022-11-25

## 2022-11-24 RX ADMIN — CHLORHEXIDINE GLUCONATE 15 ML: 1.2 SOLUTION ORAL at 18:41

## 2022-11-24 RX ADMIN — MUPIROCIN 1 APPLICATION: 20 OINTMENT TOPICAL at 18:41

## 2022-11-24 RX ADMIN — Medication 10 ML: at 08:38

## 2022-11-24 RX ADMIN — POTASSIUM CHLORIDE, DEXTROSE MONOHYDRATE AND SODIUM CHLORIDE 100 ML/HR: 150; 5; 450 INJECTION, SOLUTION INTRAVENOUS at 05:28

## 2022-11-24 RX ADMIN — POTASSIUM CHLORIDE, DEXTROSE MONOHYDRATE AND SODIUM CHLORIDE 100 ML/HR: 150; 5; 450 INJECTION, SOLUTION INTRAVENOUS at 15:13

## 2022-11-24 RX ADMIN — ASPIRIN 325 MG: 325 TABLET, COATED ORAL at 08:36

## 2022-11-24 RX ADMIN — ACETAMINOPHEN 650 MG: 325 TABLET ORAL at 08:37

## 2022-11-24 RX ADMIN — EPTIFIBATIDE 2 MCG/KG/MIN: 75 INJECTION INTRAVENOUS at 06:32

## 2022-11-24 RX ADMIN — METOPROLOL TARTRATE 25 MG: 25 TABLET, FILM COATED ORAL at 21:14

## 2022-11-24 RX ADMIN — Medication 10 ML: at 21:14

## 2022-11-24 RX ADMIN — METOPROLOL TARTRATE 25 MG: 25 TABLET, FILM COATED ORAL at 08:40

## 2022-11-24 RX ADMIN — ACETAMINOPHEN 650 MG: 325 TABLET ORAL at 21:23

## 2022-11-24 RX ADMIN — HEPARIN SODIUM 13.1 UNITS/KG/HR: 10000 INJECTION, SOLUTION INTRAVENOUS at 21:15

## 2022-11-24 RX ADMIN — ATORVASTATIN CALCIUM 80 MG: 40 TABLET, FILM COATED ORAL at 21:14

## 2022-11-25 ENCOUNTER — ANESTHESIA (OUTPATIENT)
Dept: PERIOP | Facility: HOSPITAL | Age: 72
End: 2022-11-25

## 2022-11-25 ENCOUNTER — ANESTHESIA EVENT CONVERTED (OUTPATIENT)
Dept: ANESTHESIOLOGY | Facility: HOSPITAL | Age: 72
End: 2022-11-25

## 2022-11-25 ENCOUNTER — ANCILLARY PROCEDURE (OUTPATIENT)
Dept: PERIOP | Facility: HOSPITAL | Age: 72
End: 2022-11-25

## 2022-11-25 ENCOUNTER — APPOINTMENT (OUTPATIENT)
Dept: GENERAL RADIOLOGY | Facility: HOSPITAL | Age: 72
End: 2022-11-25

## 2022-11-25 LAB
ACT BLD: 237 SECONDS (ref 82–152)
ALBUMIN SERPL-MCNC: 4 G/DL (ref 3.5–5.2)
ALBUMIN SERPL-MCNC: 4.2 G/DL (ref 3.5–5.2)
ANION GAP SERPL CALCULATED.3IONS-SCNC: 11 MMOL/L (ref 5–15)
ANION GAP SERPL CALCULATED.3IONS-SCNC: 12 MMOL/L (ref 5–15)
ANION GAP SERPL CALCULATED.3IONS-SCNC: 9 MMOL/L (ref 5–15)
APTT PPP: 29.6 SECONDS (ref 22–39)
ARTERIAL PATENCY WRIST A: ABNORMAL
ARTERIAL PATENCY WRIST A: ABNORMAL
ATMOSPHERIC PRESS: ABNORMAL MM[HG]
ATMOSPHERIC PRESS: ABNORMAL MM[HG]
BASE EXCESS BLDA CALC-SCNC: -5.1 MMOL/L (ref 0–2)
BASE EXCESS BLDA CALC-SCNC: -5.4 MMOL/L (ref 0–2)
BDY SITE: ABNORMAL
BDY SITE: ABNORMAL
BH CV XLRA MEAS LEFT DIST CCA EDV: 16.2 CM/SEC
BH CV XLRA MEAS LEFT DIST CCA PSV: 71.3 CM/SEC
BH CV XLRA MEAS LEFT DIST ICA EDV: 29 CM/SEC
BH CV XLRA MEAS LEFT DIST ICA PSV: 111 CM/SEC
BH CV XLRA MEAS LEFT ICA/CCA RATIO: 1.97
BH CV XLRA MEAS LEFT MID CCA EDV: 18.7 CM/SEC
BH CV XLRA MEAS LEFT MID CCA PSV: 72.2 CM/SEC
BH CV XLRA MEAS LEFT MID ICA EDV: 28.6 CM/SEC
BH CV XLRA MEAS LEFT MID ICA PSV: 142 CM/SEC
BH CV XLRA MEAS LEFT PROX CCA EDV: 15.5 CM/SEC
BH CV XLRA MEAS LEFT PROX CCA PSV: 96.9 CM/SEC
BH CV XLRA MEAS LEFT PROX ECA EDV: 14.1 CM/SEC
BH CV XLRA MEAS LEFT PROX ECA PSV: 210 CM/SEC
BH CV XLRA MEAS LEFT PROX ICA EDV: 34.7 CM/SEC
BH CV XLRA MEAS LEFT PROX ICA PSV: 131 CM/SEC
BH CV XLRA MEAS LEFT PROX SCLA PSV: 100 CM/SEC
BH CV XLRA MEAS LEFT VERTEBRAL A EDV: 15.4 CM/SEC
BH CV XLRA MEAS LEFT VERTEBRAL A PSV: 55.3 CM/SEC
BH CV XLRA MEAS RIGHT DIST CCA EDV: 13.2 CM/SEC
BH CV XLRA MEAS RIGHT DIST CCA PSV: 66.4 CM/SEC
BH CV XLRA MEAS RIGHT DIST ICA EDV: 26.1 CM/SEC
BH CV XLRA MEAS RIGHT DIST ICA PSV: 77.7 CM/SEC
BH CV XLRA MEAS RIGHT ICA/CCA RATIO: 0.96
BH CV XLRA MEAS RIGHT MID CCA EDV: 17.6 CM/SEC
BH CV XLRA MEAS RIGHT MID CCA PSV: 76.3 CM/SEC
BH CV XLRA MEAS RIGHT MID ICA EDV: 22.4 CM/SEC
BH CV XLRA MEAS RIGHT MID ICA PSV: 72.9 CM/SEC
BH CV XLRA MEAS RIGHT PROX CCA EDV: 18.2 CM/SEC
BH CV XLRA MEAS RIGHT PROX CCA PSV: 136 CM/SEC
BH CV XLRA MEAS RIGHT PROX ECA EDV: 14.5 CM/SEC
BH CV XLRA MEAS RIGHT PROX ECA PSV: 167 CM/SEC
BH CV XLRA MEAS RIGHT PROX ICA EDV: 14.5 CM/SEC
BH CV XLRA MEAS RIGHT PROX ICA PSV: 61.5 CM/SEC
BH CV XLRA MEAS RIGHT PROX SCLA PSV: 104 CM/SEC
BH CV XLRA MEAS RIGHT VERTEBRAL A EDV: 14.1 CM/SEC
BH CV XLRA MEAS RIGHT VERTEBRAL A PSV: 39.2 CM/SEC
BODY TEMPERATURE: 37 C
BODY TEMPERATURE: 37 C
BUN SERPL-MCNC: 10 MG/DL (ref 8–23)
BUN SERPL-MCNC: 11 MG/DL (ref 8–23)
BUN SERPL-MCNC: 12 MG/DL (ref 8–23)
BUN/CREAT SERPL: 11.1 (ref 7–25)
BUN/CREAT SERPL: 11.2 (ref 7–25)
BUN/CREAT SERPL: 11.2 (ref 7–25)
BUN/CREAT SERPL: 11.5 (ref 7–25)
BUN/CREAT SERPL: 13.6 (ref 7–25)
CA-I SERPL ISE-MCNC: 1.23 MMOL/L (ref 1.12–1.32)
CALCIUM SPEC-SCNC: 7.7 MG/DL (ref 8.6–10.5)
CALCIUM SPEC-SCNC: 7.9 MG/DL (ref 8.6–10.5)
CALCIUM SPEC-SCNC: 7.9 MG/DL (ref 8.6–10.5)
CALCIUM SPEC-SCNC: 8.1 MG/DL (ref 8.6–10.5)
CALCIUM SPEC-SCNC: 8.2 MG/DL (ref 8.6–10.5)
CHLORIDE SERPL-SCNC: 105 MMOL/L (ref 98–107)
CHLORIDE SERPL-SCNC: 109 MMOL/L (ref 98–107)
CO2 BLDA-SCNC: 19.9 MMOL/L (ref 22–33)
CO2 BLDA-SCNC: 21.4 MMOL/L (ref 22–33)
CO2 SERPL-SCNC: 18 MMOL/L (ref 22–29)
CO2 SERPL-SCNC: 20 MMOL/L (ref 22–29)
CO2 SERPL-SCNC: 20 MMOL/L (ref 22–29)
CO2 SERPL-SCNC: 21 MMOL/L (ref 22–29)
CO2 SERPL-SCNC: 24 MMOL/L (ref 22–29)
COHGB MFR BLD: 0.5 % (ref 0–2)
COHGB MFR BLD: 0.6 % (ref 0–2)
CREAT SERPL-MCNC: 0.88 MG/DL (ref 0.76–1.27)
CREAT SERPL-MCNC: 0.9 MG/DL (ref 0.76–1.27)
CREAT SERPL-MCNC: 0.96 MG/DL (ref 0.76–1.27)
CREAT SERPL-MCNC: 0.98 MG/DL (ref 0.76–1.27)
CREAT SERPL-MCNC: 0.98 MG/DL (ref 0.76–1.27)
DEPRECATED RDW RBC AUTO: 45.5 FL (ref 37–54)
DEPRECATED RDW RBC AUTO: 45.6 FL (ref 37–54)
DEPRECATED RDW RBC AUTO: 46.5 FL (ref 37–54)
DEPRECATED RDW RBC AUTO: 46.8 FL (ref 37–54)
EGFRCR SERPLBLD CKD-EPI 2021: 81.9 ML/MIN/1.73
EGFRCR SERPLBLD CKD-EPI 2021: 81.9 ML/MIN/1.73
EGFRCR SERPLBLD CKD-EPI 2021: 84 ML/MIN/1.73
EGFRCR SERPLBLD CKD-EPI 2021: 90.7 ML/MIN/1.73
EGFRCR SERPLBLD CKD-EPI 2021: 91.4 ML/MIN/1.73
EPAP: 0
EPAP: 0
ERYTHROCYTE [DISTWIDTH] IN BLOOD BY AUTOMATED COUNT: 13.5 % (ref 12.3–15.4)
ERYTHROCYTE [DISTWIDTH] IN BLOOD BY AUTOMATED COUNT: 13.8 % (ref 12.3–15.4)
ERYTHROCYTE [DISTWIDTH] IN BLOOD BY AUTOMATED COUNT: 13.8 % (ref 12.3–15.4)
ERYTHROCYTE [DISTWIDTH] IN BLOOD BY AUTOMATED COUNT: 13.9 % (ref 12.3–15.4)
GLUCOSE BLDC GLUCOMTR-MCNC: 119 MG/DL (ref 70–130)
GLUCOSE BLDC GLUCOMTR-MCNC: 122 MG/DL (ref 70–130)
GLUCOSE BLDC GLUCOMTR-MCNC: 125 MG/DL (ref 70–130)
GLUCOSE BLDC GLUCOMTR-MCNC: 134 MG/DL (ref 70–130)
GLUCOSE BLDC GLUCOMTR-MCNC: 140 MG/DL (ref 70–130)
GLUCOSE BLDC GLUCOMTR-MCNC: 141 MG/DL (ref 70–130)
GLUCOSE BLDC GLUCOMTR-MCNC: 149 MG/DL (ref 70–130)
GLUCOSE BLDC GLUCOMTR-MCNC: 165 MG/DL (ref 70–130)
GLUCOSE BLDC GLUCOMTR-MCNC: 169 MG/DL (ref 70–130)
GLUCOSE BLDC GLUCOMTR-MCNC: 190 MG/DL (ref 70–130)
GLUCOSE SERPL-MCNC: 122 MG/DL (ref 65–99)
GLUCOSE SERPL-MCNC: 138 MG/DL (ref 65–99)
GLUCOSE SERPL-MCNC: 175 MG/DL (ref 65–99)
GLUCOSE SERPL-MCNC: 176 MG/DL (ref 65–99)
GLUCOSE SERPL-MCNC: 176 MG/DL (ref 65–99)
HCO3 BLDA-SCNC: 19 MMOL/L (ref 20–26)
HCO3 BLDA-SCNC: 20.2 MMOL/L (ref 20–26)
HCT VFR BLD AUTO: 28 % (ref 37.5–51)
HCT VFR BLD AUTO: 29 % (ref 37.5–51)
HCT VFR BLD AUTO: 34.6 % (ref 37.5–51)
HCT VFR BLD AUTO: 39.6 % (ref 37.5–51)
HCT VFR BLD CALC: 28.2 % (ref 38–51)
HCT VFR BLD CALC: 36.1 % (ref 38–51)
HGB BLD-MCNC: 11.2 G/DL (ref 13–17.7)
HGB BLD-MCNC: 13 G/DL (ref 13–17.7)
HGB BLD-MCNC: 9.1 G/DL (ref 13–17.7)
HGB BLD-MCNC: 9.4 G/DL (ref 13–17.7)
HGB BLDA-MCNC: 11.8 G/DL (ref 13.5–17.5)
HGB BLDA-MCNC: 9.2 G/DL (ref 13.5–17.5)
INHALED O2 CONCENTRATION: 100 %
INHALED O2 CONCENTRATION: 40 %
INR PPP: 1.6 (ref 0.84–1.13)
IPAP: 0
IPAP: 0
MAGNESIUM SERPL-MCNC: 2 MG/DL (ref 1.6–2.4)
MAGNESIUM SERPL-MCNC: 2.2 MG/DL (ref 1.6–2.4)
MAGNESIUM SERPL-MCNC: 2.4 MG/DL (ref 1.6–2.4)
MAXIMAL PREDICTED HEART RATE: 148 BPM
MCH RBC QN AUTO: 29.5 PG (ref 26.6–33)
MCH RBC QN AUTO: 29.6 PG (ref 26.6–33)
MCH RBC QN AUTO: 29.7 PG (ref 26.6–33)
MCH RBC QN AUTO: 29.8 PG (ref 26.6–33)
MCHC RBC AUTO-ENTMCNC: 32.4 G/DL (ref 31.5–35.7)
MCHC RBC AUTO-ENTMCNC: 32.4 G/DL (ref 31.5–35.7)
MCHC RBC AUTO-ENTMCNC: 32.5 G/DL (ref 31.5–35.7)
MCHC RBC AUTO-ENTMCNC: 32.8 G/DL (ref 31.5–35.7)
MCV RBC AUTO: 90 FL (ref 79–97)
MCV RBC AUTO: 91.5 FL (ref 79–97)
MCV RBC AUTO: 91.5 FL (ref 79–97)
MCV RBC AUTO: 92.1 FL (ref 79–97)
METHGB BLD QL: 0.7 % (ref 0–1.5)
METHGB BLD QL: 0.7 % (ref 0–1.5)
MODALITY: ABNORMAL
MODALITY: ABNORMAL
NOTE: ABNORMAL
NOTE: ABNORMAL
OXYHGB MFR BLDV: 96.8 % (ref 94–99)
OXYHGB MFR BLDV: 98.5 % (ref 94–99)
PA ADP PRP-ACNC: 126 PRU
PAW @ PEAK INSP FLOW SETTING VENT: 0 CMH2O
PAW @ PEAK INSP FLOW SETTING VENT: 0 CMH2O
PCO2 BLDA: 31.5 MM HG (ref 35–45)
PCO2 BLDA: 38.8 MM HG (ref 35–45)
PCO2 TEMP ADJ BLD: 31.5 MM HG (ref 35–48)
PCO2 TEMP ADJ BLD: 38.8 MM HG (ref 35–48)
PEEP RESPIRATORY: 5 CM[H2O]
PEEP RESPIRATORY: 5 CM[H2O]
PH BLDA: 7.33 PH UNITS (ref 7.35–7.45)
PH BLDA: 7.39 PH UNITS (ref 7.35–7.45)
PH, TEMP CORRECTED: 7.33 PH UNITS
PH, TEMP CORRECTED: 7.39 PH UNITS
PHOSPHATE SERPL-MCNC: 2 MG/DL (ref 2.5–4.5)
PHOSPHATE SERPL-MCNC: 2.2 MG/DL (ref 2.5–4.5)
PLATELET # BLD AUTO: 117 10*3/MM3 (ref 140–450)
PLATELET # BLD AUTO: 190 10*3/MM3 (ref 140–450)
PLATELET # BLD AUTO: 91 10*3/MM3 (ref 140–450)
PLATELET # BLD AUTO: 98 10*3/MM3 (ref 140–450)
PMV BLD AUTO: 10.5 FL (ref 6–12)
PMV BLD AUTO: 10.6 FL (ref 6–12)
PMV BLD AUTO: 10.7 FL (ref 6–12)
PMV BLD AUTO: 10.7 FL (ref 6–12)
PO2 BLDA: 105 MM HG (ref 83–108)
PO2 BLDA: 212 MM HG (ref 83–108)
PO2 TEMP ADJ BLD: 105 MM HG (ref 83–108)
PO2 TEMP ADJ BLD: 212 MM HG (ref 83–108)
POTASSIUM SERPL-SCNC: 3.9 MMOL/L (ref 3.5–5.2)
POTASSIUM SERPL-SCNC: 4 MMOL/L (ref 3.5–5.2)
POTASSIUM SERPL-SCNC: 4 MMOL/L (ref 3.5–5.2)
POTASSIUM SERPL-SCNC: 4.4 MMOL/L (ref 3.5–5.2)
POTASSIUM SERPL-SCNC: 4.5 MMOL/L (ref 3.5–5.2)
PROTHROMBIN TIME: 19 SECONDS (ref 11.4–14.4)
QT INTERVAL: 420 MS
QTC INTERVAL: 415 MS
RBC # BLD AUTO: 3.06 10*6/MM3 (ref 4.14–5.8)
RBC # BLD AUTO: 3.15 10*6/MM3 (ref 4.14–5.8)
RBC # BLD AUTO: 3.78 10*6/MM3 (ref 4.14–5.8)
RBC # BLD AUTO: 4.4 10*6/MM3 (ref 4.14–5.8)
RIGHT ARM BP: NORMAL MMHG
SODIUM SERPL-SCNC: 138 MMOL/L (ref 136–145)
SODIUM SERPL-SCNC: 139 MMOL/L (ref 136–145)
SODIUM SERPL-SCNC: 140 MMOL/L (ref 136–145)
SODIUM SERPL-SCNC: 140 MMOL/L (ref 136–145)
SODIUM SERPL-SCNC: 141 MMOL/L (ref 136–145)
STRESS TARGET HR: 126 BPM
TOTAL RATE: 14 BREATHS/MINUTE
TOTAL RATE: 34 BREATHS/MINUTE
TROPONIN T SERPL-MCNC: 1.92 NG/ML (ref 0–0.03)
TROPONIN T SERPL-MCNC: 1.95 NG/ML (ref 0–0.03)
VENTILATOR MODE: ABNORMAL
VENTILATOR MODE: ABNORMAL
VT ON VENT VENT: 0.39 ML
VT ON VENT VENT: 0.7 ML
WBC NRBC COR # BLD: 11.98 10*3/MM3 (ref 3.4–10.8)
WBC NRBC COR # BLD: 17.99 10*3/MM3 (ref 3.4–10.8)
WBC NRBC COR # BLD: 18.63 10*3/MM3 (ref 3.4–10.8)
WBC NRBC COR # BLD: 22.79 10*3/MM3 (ref 3.4–10.8)

## 2022-11-25 PROCEDURE — 82962 GLUCOSE BLOOD TEST: CPT

## 2022-11-25 PROCEDURE — 33533 CABG ARTERIAL SINGLE: CPT | Performed by: STUDENT IN AN ORGANIZED HEALTH CARE EDUCATION/TRAINING PROGRAM

## 2022-11-25 PROCEDURE — 93010 ELECTROCARDIOGRAM REPORT: CPT | Performed by: INTERNAL MEDICINE

## 2022-11-25 PROCEDURE — 82947 ASSAY GLUCOSE BLOOD QUANT: CPT

## 2022-11-25 PROCEDURE — 25010000002 FENTANYL CITRATE (PF) 50 MCG/ML SOLUTION: Performed by: STUDENT IN AN ORGANIZED HEALTH CARE EDUCATION/TRAINING PROGRAM

## 2022-11-25 PROCEDURE — 84295 ASSAY OF SERUM SODIUM: CPT

## 2022-11-25 PROCEDURE — P9041 ALBUMIN (HUMAN),5%, 50ML: HCPCS | Performed by: ANESTHESIOLOGY

## 2022-11-25 PROCEDURE — 83050 HGB METHEMOGLOBIN QUAN: CPT

## 2022-11-25 PROCEDURE — A4648 IMPLANTABLE TISSUE MARKER: HCPCS | Performed by: STUDENT IN AN ORGANIZED HEALTH CARE EDUCATION/TRAINING PROGRAM

## 2022-11-25 PROCEDURE — 25010000002 FENTANYL CITRATE (PF) 1000 MCG/20ML SOLUTION: Performed by: ANESTHESIOLOGY

## 2022-11-25 PROCEDURE — 25010000002 HEPARIN (PORCINE) PER 1000 UNITS: Performed by: STUDENT IN AN ORGANIZED HEALTH CARE EDUCATION/TRAINING PROGRAM

## 2022-11-25 PROCEDURE — 85610 PROTHROMBIN TIME: CPT | Performed by: STUDENT IN AN ORGANIZED HEALTH CARE EDUCATION/TRAINING PROGRAM

## 2022-11-25 PROCEDURE — 25010000002 MORPHINE PER 10 MG: Performed by: STUDENT IN AN ORGANIZED HEALTH CARE EDUCATION/TRAINING PROGRAM

## 2022-11-25 PROCEDURE — 33533 CABG ARTERIAL SINGLE: CPT | Performed by: PHYSICIAN ASSISTANT

## 2022-11-25 PROCEDURE — 33521 CABG ARTERY-VEIN FOUR: CPT | Performed by: PHYSICIAN ASSISTANT

## 2022-11-25 PROCEDURE — 99231 SBSQ HOSP IP/OBS SF/LOW 25: CPT | Performed by: INTERNAL MEDICINE

## 2022-11-25 PROCEDURE — 85027 COMPLETE CBC AUTOMATED: CPT

## 2022-11-25 PROCEDURE — 82803 BLOOD GASES ANY COMBINATION: CPT

## 2022-11-25 PROCEDURE — 99233 SBSQ HOSP IP/OBS HIGH 50: CPT | Performed by: INTERNAL MEDICINE

## 2022-11-25 PROCEDURE — 25010000002 PROTAMINE SULFATE PER 10 MG: Performed by: ANESTHESIOLOGY

## 2022-11-25 PROCEDURE — 25010000002 PROPOFOL 10 MG/ML EMULSION: Performed by: STUDENT IN AN ORGANIZED HEALTH CARE EDUCATION/TRAINING PROGRAM

## 2022-11-25 PROCEDURE — P9041 ALBUMIN (HUMAN),5%, 50ML: HCPCS | Performed by: STUDENT IN AN ORGANIZED HEALTH CARE EDUCATION/TRAINING PROGRAM

## 2022-11-25 PROCEDURE — 33508 ENDOSCOPIC VEIN HARVEST: CPT | Performed by: PHYSICIAN ASSISTANT

## 2022-11-25 PROCEDURE — 93318 ECHO TRANSESOPHAGEAL INTRAOP: CPT | Performed by: ANESTHESIOLOGY

## 2022-11-25 PROCEDURE — 02100Z9 BYPASS CORONARY ARTERY, ONE ARTERY FROM LEFT INTERNAL MAMMARY, OPEN APPROACH: ICD-10-PCS | Performed by: STUDENT IN AN ORGANIZED HEALTH CARE EDUCATION/TRAINING PROGRAM

## 2022-11-25 PROCEDURE — 25010000002 ALBUMIN HUMAN 5% PER 50 ML: Performed by: STUDENT IN AN ORGANIZED HEALTH CARE EDUCATION/TRAINING PROGRAM

## 2022-11-25 PROCEDURE — 25810000003 DEXTROSE 5 % WITH KCL 20 MEQ 20-5 MEQ/L-% SOLUTION: Performed by: STUDENT IN AN ORGANIZED HEALTH CARE EDUCATION/TRAINING PROGRAM

## 2022-11-25 PROCEDURE — 25010000002 CEFAZOLIN IN DEXTROSE 2-4 GM/100ML-% SOLUTION: Performed by: STUDENT IN AN ORGANIZED HEALTH CARE EDUCATION/TRAINING PROGRAM

## 2022-11-25 PROCEDURE — 80048 BASIC METABOLIC PNL TOTAL CA: CPT

## 2022-11-25 PROCEDURE — 84484 ASSAY OF TROPONIN QUANT: CPT | Performed by: PHYSICIAN ASSISTANT

## 2022-11-25 PROCEDURE — 36600 WITHDRAWAL OF ARTERIAL BLOOD: CPT

## 2022-11-25 PROCEDURE — 83735 ASSAY OF MAGNESIUM: CPT | Performed by: STUDENT IN AN ORGANIZED HEALTH CARE EDUCATION/TRAINING PROGRAM

## 2022-11-25 PROCEDURE — 71045 X-RAY EXAM CHEST 1 VIEW: CPT

## 2022-11-25 PROCEDURE — 80048 BASIC METABOLIC PNL TOTAL CA: CPT | Performed by: STUDENT IN AN ORGANIZED HEALTH CARE EDUCATION/TRAINING PROGRAM

## 2022-11-25 PROCEDURE — 93005 ELECTROCARDIOGRAM TRACING: CPT | Performed by: PHYSICIAN ASSISTANT

## 2022-11-25 PROCEDURE — 94002 VENT MGMT INPAT INIT DAY: CPT

## 2022-11-25 PROCEDURE — 82375 ASSAY CARBOXYHB QUANT: CPT

## 2022-11-25 PROCEDURE — 84132 ASSAY OF SERUM POTASSIUM: CPT

## 2022-11-25 PROCEDURE — 82805 BLOOD GASES W/O2 SATURATION: CPT

## 2022-11-25 PROCEDURE — 94799 UNLISTED PULMONARY SVC/PX: CPT

## 2022-11-25 PROCEDURE — 021309W BYPASS CORONARY ARTERY, FOUR OR MORE ARTERIES FROM AORTA WITH AUTOLOGOUS VENOUS TISSUE, OPEN APPROACH: ICD-10-PCS | Performed by: STUDENT IN AN ORGANIZED HEALTH CARE EDUCATION/TRAINING PROGRAM

## 2022-11-25 PROCEDURE — 88307 TISSUE EXAM BY PATHOLOGIST: CPT | Performed by: STUDENT IN AN ORGANIZED HEALTH CARE EDUCATION/TRAINING PROGRAM

## 2022-11-25 PROCEDURE — 33508 ENDOSCOPIC VEIN HARVEST: CPT | Performed by: STUDENT IN AN ORGANIZED HEALTH CARE EDUCATION/TRAINING PROGRAM

## 2022-11-25 PROCEDURE — 82330 ASSAY OF CALCIUM: CPT

## 2022-11-25 PROCEDURE — 85027 COMPLETE CBC AUTOMATED: CPT | Performed by: STUDENT IN AN ORGANIZED HEALTH CARE EDUCATION/TRAINING PROGRAM

## 2022-11-25 PROCEDURE — 82330 ASSAY OF CALCIUM: CPT | Performed by: STUDENT IN AN ORGANIZED HEALTH CARE EDUCATION/TRAINING PROGRAM

## 2022-11-25 PROCEDURE — 25010000002 CEFAZOLIN PER 500 MG: Performed by: ANESTHESIOLOGY

## 2022-11-25 PROCEDURE — 07TM0ZZ RESECTION OF THYMUS, OPEN APPROACH: ICD-10-PCS | Performed by: STUDENT IN AN ORGANIZED HEALTH CARE EDUCATION/TRAINING PROGRAM

## 2022-11-25 PROCEDURE — B246ZZ4 ULTRASONOGRAPHY OF RIGHT AND LEFT HEART, TRANSESOPHAGEAL: ICD-10-PCS | Performed by: STUDENT IN AN ORGANIZED HEALTH CARE EDUCATION/TRAINING PROGRAM

## 2022-11-25 PROCEDURE — C1751 CATH, INF, PER/CENT/MIDLINE: HCPCS | Performed by: ANESTHESIOLOGY

## 2022-11-25 PROCEDURE — 33521 CABG ARTERY-VEIN FOUR: CPT | Performed by: STUDENT IN AN ORGANIZED HEALTH CARE EDUCATION/TRAINING PROGRAM

## 2022-11-25 PROCEDURE — 25010000002 ALBUMIN HUMAN 5% PER 50 ML: Performed by: ANESTHESIOLOGY

## 2022-11-25 PROCEDURE — 85576 BLOOD PLATELET AGGREGATION: CPT | Performed by: PHYSICIAN ASSISTANT

## 2022-11-25 PROCEDURE — 25010000002 PAPAVERINE PER 60 MG: Performed by: STUDENT IN AN ORGANIZED HEALTH CARE EDUCATION/TRAINING PROGRAM

## 2022-11-25 PROCEDURE — 5A1221Z PERFORMANCE OF CARDIAC OUTPUT, CONTINUOUS: ICD-10-PCS | Performed by: STUDENT IN AN ORGANIZED HEALTH CARE EDUCATION/TRAINING PROGRAM

## 2022-11-25 PROCEDURE — 80069 RENAL FUNCTION PANEL: CPT | Performed by: STUDENT IN AN ORGANIZED HEALTH CARE EDUCATION/TRAINING PROGRAM

## 2022-11-25 PROCEDURE — 25010000002 HEPARIN (PORCINE) PER 1000 UNITS: Performed by: ANESTHESIOLOGY

## 2022-11-25 PROCEDURE — 85347 COAGULATION TIME ACTIVATED: CPT

## 2022-11-25 PROCEDURE — 25010000002 MIDAZOLAM PER 1 MG: Performed by: ANESTHESIOLOGY

## 2022-11-25 PROCEDURE — 85730 THROMBOPLASTIN TIME PARTIAL: CPT | Performed by: STUDENT IN AN ORGANIZED HEALTH CARE EDUCATION/TRAINING PROGRAM

## 2022-11-25 PROCEDURE — 06BP4ZZ EXCISION OF RIGHT SAPHENOUS VEIN, PERCUTANEOUS ENDOSCOPIC APPROACH: ICD-10-PCS | Performed by: STUDENT IN AN ORGANIZED HEALTH CARE EDUCATION/TRAINING PROGRAM

## 2022-11-25 PROCEDURE — 85014 HEMATOCRIT: CPT

## 2022-11-25 DEVICE — DISK-SHAPED STYLE, SILICONE (1 PER STERILE PKG)
Type: IMPLANTABLE DEVICE | Site: HEART | Status: FUNCTIONAL
Brand: SCANLAN® RADIOMARK® GRAFT MARKERS

## 2022-11-25 DEVICE — LIGACLIP MCA MULTIPLE CLIP APPLIERS, 20 SMALL CLIPS
Type: IMPLANTABLE DEVICE | Site: LEG | Status: FUNCTIONAL
Brand: LIGACLIP

## 2022-11-25 RX ORDER — PROTAMINE SULFATE 10 MG/ML
50 INJECTION, SOLUTION INTRAVENOUS ONCE
Status: DISCONTINUED | OUTPATIENT
Start: 2022-11-25 | End: 2022-11-26

## 2022-11-25 RX ORDER — SODIUM CHLORIDE 0.9 % (FLUSH) 0.9 %
10 SYRINGE (ML) INJECTION AS NEEDED
Status: DISCONTINUED | OUTPATIENT
Start: 2022-11-25 | End: 2022-11-25 | Stop reason: SDUPTHER

## 2022-11-25 RX ORDER — GABAPENTIN 100 MG/1
100 CAPSULE ORAL EVERY 8 HOURS
Status: DISCONTINUED | OUTPATIENT
Start: 2022-11-25 | End: 2022-11-25

## 2022-11-25 RX ORDER — ALBUTEROL SULFATE 2.5 MG/3ML
2.5 SOLUTION RESPIRATORY (INHALATION) EVERY 4 HOURS PRN
Status: DISCONTINUED | OUTPATIENT
Start: 2022-11-25 | End: 2022-11-26

## 2022-11-25 RX ORDER — NICARDIPINE HYDROCHLORIDE 2.5 MG/ML
INJECTION INTRAVENOUS AS NEEDED
Status: DISCONTINUED | OUTPATIENT
Start: 2022-11-25 | End: 2022-11-25 | Stop reason: SURG

## 2022-11-25 RX ORDER — ONDANSETRON 2 MG/ML
4 INJECTION INTRAMUSCULAR; INTRAVENOUS EVERY 6 HOURS PRN
Status: DISCONTINUED | OUTPATIENT
Start: 2022-11-25 | End: 2022-11-30 | Stop reason: HOSPADM

## 2022-11-25 RX ORDER — SODIUM CHLORIDE, SODIUM LACTATE, POTASSIUM CHLORIDE, CALCIUM CHLORIDE 600; 310; 30; 20 MG/100ML; MG/100ML; MG/100ML; MG/100ML
9 INJECTION, SOLUTION INTRAVENOUS CONTINUOUS
Status: DISCONTINUED | OUTPATIENT
Start: 2022-11-25 | End: 2022-11-25 | Stop reason: SDUPTHER

## 2022-11-25 RX ORDER — FENTANYL CITRATE 50 UG/ML
25 INJECTION, SOLUTION INTRAMUSCULAR; INTRAVENOUS
Status: DISCONTINUED | OUTPATIENT
Start: 2022-11-25 | End: 2022-11-27

## 2022-11-25 RX ORDER — MORPHINE SULFATE 2 MG/ML
2 INJECTION, SOLUTION INTRAMUSCULAR; INTRAVENOUS
Status: DISCONTINUED | OUTPATIENT
Start: 2022-11-25 | End: 2022-11-30

## 2022-11-25 RX ORDER — MAGNESIUM HYDROXIDE 1200 MG/15ML
LIQUID ORAL AS NEEDED
Status: DISCONTINUED | OUTPATIENT
Start: 2022-11-25 | End: 2022-11-25 | Stop reason: HOSPADM

## 2022-11-25 RX ORDER — POTASSIUM CHLORIDE 750 MG/1
40 CAPSULE, EXTENDED RELEASE ORAL AS NEEDED
Status: DISCONTINUED | OUTPATIENT
Start: 2022-11-25 | End: 2022-11-25

## 2022-11-25 RX ORDER — PROTAMINE SULFATE 10 MG/ML
INJECTION, SOLUTION INTRAVENOUS AS NEEDED
Status: DISCONTINUED | OUTPATIENT
Start: 2022-11-25 | End: 2022-11-25 | Stop reason: SURG

## 2022-11-25 RX ORDER — ASPIRIN 325 MG
325 TABLET ORAL DAILY
Status: DISCONTINUED | OUTPATIENT
Start: 2022-11-26 | End: 2022-11-28

## 2022-11-25 RX ORDER — AMINOCAPROIC ACID 250 MG/ML
INJECTION, SOLUTION INTRAVENOUS AS NEEDED
Status: DISCONTINUED | OUTPATIENT
Start: 2022-11-25 | End: 2022-11-25 | Stop reason: SURG

## 2022-11-25 RX ORDER — DEXTROSE MONOHYDRATE 25 G/50ML
10-50 INJECTION, SOLUTION INTRAVENOUS
Status: DISCONTINUED | OUTPATIENT
Start: 2022-11-25 | End: 2022-11-30 | Stop reason: HOSPADM

## 2022-11-25 RX ORDER — POTASSIUM CHLORIDE, DEXTROSE MONOHYDRATE 150; 5 MG/100ML; G/100ML
30 INJECTION, SOLUTION INTRAVENOUS CONTINUOUS
Status: DISCONTINUED | OUTPATIENT
Start: 2022-11-25 | End: 2022-11-27

## 2022-11-25 RX ORDER — METOPROLOL TARTRATE 5 MG/5ML
2.5 INJECTION INTRAVENOUS EVERY 6 HOURS SCHEDULED
Status: DISCONTINUED | OUTPATIENT
Start: 2022-11-25 | End: 2022-11-26

## 2022-11-25 RX ORDER — CALCIUM CHLORIDE 100 MG/ML
INJECTION INTRAVENOUS; INTRAVENTRICULAR AS NEEDED
Status: DISCONTINUED | OUTPATIENT
Start: 2022-11-25 | End: 2022-11-25 | Stop reason: SURG

## 2022-11-25 RX ORDER — CEFAZOLIN SODIUM 2 G/100ML
2 INJECTION, SOLUTION INTRAVENOUS EVERY 8 HOURS
Status: COMPLETED | OUTPATIENT
Start: 2022-11-25 | End: 2022-11-27

## 2022-11-25 RX ORDER — AMOXICILLIN 250 MG
2 CAPSULE ORAL 2 TIMES DAILY
Status: DISCONTINUED | OUTPATIENT
Start: 2022-11-25 | End: 2022-11-25

## 2022-11-25 RX ORDER — HYDROCODONE BITARTRATE AND ACETAMINOPHEN 7.5; 325 MG/1; MG/1
1 TABLET ORAL EVERY 4 HOURS PRN
Status: DISCONTINUED | OUTPATIENT
Start: 2022-11-25 | End: 2022-11-25

## 2022-11-25 RX ORDER — POLYETHYLENE GLYCOL 3350 17 G/17G
17 POWDER, FOR SOLUTION ORAL DAILY PRN
Status: DISCONTINUED | OUTPATIENT
Start: 2022-11-25 | End: 2022-11-25

## 2022-11-25 RX ORDER — SODIUM CHLORIDE 9 MG/ML
INJECTION, SOLUTION INTRAVENOUS AS NEEDED
Status: DISCONTINUED | OUTPATIENT
Start: 2022-11-25 | End: 2022-11-25 | Stop reason: HOSPADM

## 2022-11-25 RX ORDER — SODIUM CHLORIDE 0.9 % (FLUSH) 0.9 %
10 SYRINGE (ML) INJECTION EVERY 12 HOURS SCHEDULED
Status: DISCONTINUED | OUTPATIENT
Start: 2022-11-25 | End: 2022-11-25 | Stop reason: SDUPTHER

## 2022-11-25 RX ORDER — NITROGLYCERIN 20 MG/100ML
5-200 INJECTION INTRAVENOUS CONTINUOUS PRN
Status: DISCONTINUED | OUTPATIENT
Start: 2022-11-25 | End: 2022-11-28

## 2022-11-25 RX ORDER — LIDOCAINE HYDROCHLORIDE 10 MG/ML
0.5 INJECTION, SOLUTION EPIDURAL; INFILTRATION; INTRACAUDAL; PERINEURAL ONCE AS NEEDED
Status: DISCONTINUED | OUTPATIENT
Start: 2022-11-25 | End: 2022-11-25 | Stop reason: SDUPTHER

## 2022-11-25 RX ORDER — SODIUM CHLORIDE 0.9 % (FLUSH) 0.9 %
10 SYRINGE (ML) INJECTION EVERY 12 HOURS SCHEDULED
Status: DISCONTINUED | OUTPATIENT
Start: 2022-11-25 | End: 2022-11-25 | Stop reason: HOSPADM

## 2022-11-25 RX ORDER — PAPAVERINE HYDROCHLORIDE 30 MG/ML
INJECTION INTRAMUSCULAR; INTRAVENOUS AS NEEDED
Status: DISCONTINUED | OUTPATIENT
Start: 2022-11-25 | End: 2022-11-25 | Stop reason: HOSPADM

## 2022-11-25 RX ORDER — HEPARIN SODIUM 1000 [USP'U]/ML
INJECTION, SOLUTION INTRAVENOUS; SUBCUTANEOUS AS NEEDED
Status: DISCONTINUED | OUTPATIENT
Start: 2022-11-25 | End: 2022-11-25 | Stop reason: SURG

## 2022-11-25 RX ORDER — POTASSIUM CHLORIDE 1.5 G/1.77G
40 POWDER, FOR SOLUTION ORAL AS NEEDED
Status: DISCONTINUED | OUTPATIENT
Start: 2022-11-25 | End: 2022-11-25

## 2022-11-25 RX ORDER — ACETAMINOPHEN 325 MG/1
650 TABLET ORAL EVERY 8 HOURS
Status: DISCONTINUED | OUTPATIENT
Start: 2022-11-25 | End: 2022-11-28

## 2022-11-25 RX ORDER — DEXMEDETOMIDINE HYDROCHLORIDE 4 UG/ML
.2-1.5 INJECTION, SOLUTION INTRAVENOUS CONTINUOUS PRN
Status: DISCONTINUED | OUTPATIENT
Start: 2022-11-25 | End: 2022-11-26

## 2022-11-25 RX ORDER — SODIUM CHLORIDE 9 MG/ML
INJECTION, SOLUTION INTRAVENOUS CONTINUOUS PRN
Status: DISCONTINUED | OUTPATIENT
Start: 2022-11-25 | End: 2022-11-25 | Stop reason: SURG

## 2022-11-25 RX ORDER — ATORVASTATIN CALCIUM 40 MG/1
80 TABLET, FILM COATED ORAL NIGHTLY
Status: DISCONTINUED | OUTPATIENT
Start: 2022-11-25 | End: 2022-11-28

## 2022-11-25 RX ORDER — CHLORHEXIDINE GLUCONATE 0.12 MG/ML
15 RINSE ORAL EVERY 12 HOURS SCHEDULED
Status: DISCONTINUED | OUTPATIENT
Start: 2022-11-25 | End: 2022-11-26

## 2022-11-25 RX ORDER — ACETAMINOPHEN 325 MG/1
650 TABLET ORAL EVERY 8 HOURS
Status: DISCONTINUED | OUTPATIENT
Start: 2022-11-25 | End: 2022-11-25

## 2022-11-25 RX ORDER — DOBUTAMINE HYDROCHLORIDE 100 MG/100ML
2-20 INJECTION INTRAVENOUS CONTINUOUS PRN
Status: DISCONTINUED | OUTPATIENT
Start: 2022-11-25 | End: 2022-11-26

## 2022-11-25 RX ORDER — MIDAZOLAM HYDROCHLORIDE 1 MG/ML
0.5 INJECTION INTRAMUSCULAR; INTRAVENOUS
Status: DISCONTINUED | OUTPATIENT
Start: 2022-11-25 | End: 2022-11-25 | Stop reason: SDUPTHER

## 2022-11-25 RX ORDER — MEPERIDINE HYDROCHLORIDE 25 MG/ML
25 INJECTION INTRAMUSCULAR; INTRAVENOUS; SUBCUTANEOUS EVERY 4 HOURS PRN
Status: DISCONTINUED | OUTPATIENT
Start: 2022-11-25 | End: 2022-11-26

## 2022-11-25 RX ORDER — POTASSIUM CHLORIDE 29.8 MG/ML
20 INJECTION INTRAVENOUS
Status: DISCONTINUED | OUTPATIENT
Start: 2022-11-25 | End: 2022-11-28

## 2022-11-25 RX ORDER — MIDAZOLAM HYDROCHLORIDE 1 MG/ML
INJECTION INTRAMUSCULAR; INTRAVENOUS AS NEEDED
Status: DISCONTINUED | OUTPATIENT
Start: 2022-11-25 | End: 2022-11-25 | Stop reason: SURG

## 2022-11-25 RX ORDER — ETOMIDATE 2 MG/ML
INJECTION INTRAVENOUS AS NEEDED
Status: DISCONTINUED | OUTPATIENT
Start: 2022-11-25 | End: 2022-11-25 | Stop reason: SURG

## 2022-11-25 RX ORDER — ACETAMINOPHEN 500 MG
1000 TABLET ORAL ONCE
Status: DISCONTINUED | OUTPATIENT
Start: 2022-11-25 | End: 2022-11-25 | Stop reason: HOSPADM

## 2022-11-25 RX ORDER — SODIUM CHLORIDE 0.9 % (FLUSH) 0.9 %
10 SYRINGE (ML) INJECTION AS NEEDED
Status: DISCONTINUED | OUTPATIENT
Start: 2022-11-25 | End: 2022-11-25 | Stop reason: HOSPADM

## 2022-11-25 RX ORDER — ASPIRIN 325 MG
325 TABLET, DELAYED RELEASE (ENTERIC COATED) ORAL DAILY
Status: DISCONTINUED | OUTPATIENT
Start: 2022-11-26 | End: 2022-11-25

## 2022-11-25 RX ORDER — SODIUM CHLORIDE 9 MG/ML
40 INJECTION, SOLUTION INTRAVENOUS AS NEEDED
Status: DISCONTINUED | OUTPATIENT
Start: 2022-11-25 | End: 2022-11-25 | Stop reason: HOSPADM

## 2022-11-25 RX ORDER — OXYCODONE HYDROCHLORIDE 5 MG/1
10 TABLET ORAL EVERY 4 HOURS PRN
Status: DISCONTINUED | OUTPATIENT
Start: 2022-11-25 | End: 2022-11-25

## 2022-11-25 RX ORDER — NOREPINEPHRINE BIT/0.9 % NACL 8 MG/250ML
.02-.3 INFUSION BOTTLE (ML) INTRAVENOUS CONTINUOUS PRN
Status: DISCONTINUED | OUTPATIENT
Start: 2022-11-25 | End: 2022-11-27

## 2022-11-25 RX ORDER — SODIUM CHLORIDE 9 MG/ML
40 INJECTION, SOLUTION INTRAVENOUS AS NEEDED
Status: DISCONTINUED | OUTPATIENT
Start: 2022-11-25 | End: 2022-11-25 | Stop reason: SDUPTHER

## 2022-11-25 RX ORDER — LIDOCAINE HYDROCHLORIDE 10 MG/ML
0.5 INJECTION, SOLUTION EPIDURAL; INFILTRATION; INTRACAUDAL; PERINEURAL ONCE AS NEEDED
Status: DISCONTINUED | OUTPATIENT
Start: 2022-11-25 | End: 2022-11-25 | Stop reason: HOSPADM

## 2022-11-25 RX ORDER — CEFAZOLIN SODIUM 1 G/3ML
INJECTION, POWDER, FOR SOLUTION INTRAMUSCULAR; INTRAVENOUS AS NEEDED
Status: DISCONTINUED | OUTPATIENT
Start: 2022-11-25 | End: 2022-11-25 | Stop reason: SURG

## 2022-11-25 RX ORDER — ASPIRIN 325 MG
325 TABLET ORAL ONCE
Status: COMPLETED | OUTPATIENT
Start: 2022-11-25 | End: 2022-11-25

## 2022-11-25 RX ORDER — FENTANYL/ROPIVACAINE/NS/PF 2-625MCG/1
15 PLASTIC BAG, INJECTION (ML) EPIDURAL AS NEEDED
Status: DISCONTINUED | OUTPATIENT
Start: 2022-11-25 | End: 2022-11-30 | Stop reason: HOSPADM

## 2022-11-25 RX ORDER — NOREPINEPHRINE BITARTRATE 1 MG/ML
INJECTION, SOLUTION INTRAVENOUS CONTINUOUS PRN
Status: DISCONTINUED | OUTPATIENT
Start: 2022-11-25 | End: 2022-11-25

## 2022-11-25 RX ORDER — ALBUMIN, HUMAN INJ 5% 5 %
250 SOLUTION INTRAVENOUS AS NEEDED
Status: DISCONTINUED | OUTPATIENT
Start: 2022-11-25 | End: 2022-11-27

## 2022-11-25 RX ORDER — NOREPINEPHRINE BIT/0.9 % NACL 8 MG/250ML
INFUSION BOTTLE (ML) INTRAVENOUS CONTINUOUS PRN
Status: DISCONTINUED | OUTPATIENT
Start: 2022-11-25 | End: 2022-11-25 | Stop reason: SURG

## 2022-11-25 RX ORDER — MIDAZOLAM HYDROCHLORIDE 1 MG/ML
0.5 INJECTION INTRAMUSCULAR; INTRAVENOUS
Status: DISCONTINUED | OUTPATIENT
Start: 2022-11-25 | End: 2022-11-25 | Stop reason: HOSPADM

## 2022-11-25 RX ORDER — ROCURONIUM BROMIDE 10 MG/ML
INJECTION, SOLUTION INTRAVENOUS AS NEEDED
Status: DISCONTINUED | OUTPATIENT
Start: 2022-11-25 | End: 2022-11-25 | Stop reason: SURG

## 2022-11-25 RX ORDER — ALBUMIN, HUMAN INJ 5% 5 %
SOLUTION INTRAVENOUS CONTINUOUS PRN
Status: DISCONTINUED | OUTPATIENT
Start: 2022-11-25 | End: 2022-11-25 | Stop reason: SURG

## 2022-11-25 RX ORDER — FAMOTIDINE 20 MG/1
20 TABLET, FILM COATED ORAL ONCE
Status: DISCONTINUED | OUTPATIENT
Start: 2022-11-25 | End: 2022-11-25 | Stop reason: HOSPADM

## 2022-11-25 RX ORDER — POLYETHYLENE GLYCOL 3350 17 G/17G
17 POWDER, FOR SOLUTION ORAL DAILY PRN
Status: DISCONTINUED | OUTPATIENT
Start: 2022-11-25 | End: 2022-11-28

## 2022-11-25 RX ORDER — DOPAMINE HYDROCHLORIDE 160 MG/100ML
2-20 INJECTION, SOLUTION INTRAVENOUS CONTINUOUS PRN
Status: DISCONTINUED | OUTPATIENT
Start: 2022-11-25 | End: 2022-11-26

## 2022-11-25 RX ORDER — FAMOTIDINE 10 MG/ML
20 INJECTION, SOLUTION INTRAVENOUS ONCE
Status: DISCONTINUED | OUTPATIENT
Start: 2022-11-25 | End: 2022-11-25 | Stop reason: HOSPADM

## 2022-11-25 RX ORDER — FENTANYL CITRATE 0.05 MG/ML
INJECTION, SOLUTION INTRAMUSCULAR; INTRAVENOUS AS NEEDED
Status: DISCONTINUED | OUTPATIENT
Start: 2022-11-25 | End: 2022-11-25 | Stop reason: SURG

## 2022-11-25 RX ORDER — OXYCODONE HYDROCHLORIDE 5 MG/1
10 TABLET ORAL EVERY 4 HOURS PRN
Status: DISCONTINUED | OUTPATIENT
Start: 2022-11-25 | End: 2022-11-28

## 2022-11-25 RX ORDER — LIDOCAINE HYDROCHLORIDE 10 MG/ML
INJECTION, SOLUTION EPIDURAL; INFILTRATION; INTRACAUDAL; PERINEURAL AS NEEDED
Status: DISCONTINUED | OUTPATIENT
Start: 2022-11-25 | End: 2022-11-25 | Stop reason: SURG

## 2022-11-25 RX ORDER — NICOTINE POLACRILEX 4 MG
15 LOZENGE BUCCAL
Status: DISCONTINUED | OUTPATIENT
Start: 2022-11-25 | End: 2022-11-25

## 2022-11-25 RX ORDER — SODIUM CHLORIDE, SODIUM LACTATE, POTASSIUM CHLORIDE, CALCIUM CHLORIDE 600; 310; 30; 20 MG/100ML; MG/100ML; MG/100ML; MG/100ML
9 INJECTION, SOLUTION INTRAVENOUS CONTINUOUS
Status: DISCONTINUED | OUTPATIENT
Start: 2022-11-25 | End: 2022-11-25

## 2022-11-25 RX ORDER — POTASSIUM CHLORIDE 1.5 G/1.77G
40 POWDER, FOR SOLUTION ORAL AS NEEDED
Status: DISCONTINUED | OUTPATIENT
Start: 2022-11-25 | End: 2022-11-28

## 2022-11-25 RX ORDER — NITROGLYCERIN 20 MG/100ML
INJECTION INTRAVENOUS CONTINUOUS PRN
Status: DISCONTINUED | OUTPATIENT
Start: 2022-11-25 | End: 2022-11-25 | Stop reason: SURG

## 2022-11-25 RX ORDER — BISACODYL 10 MG
10 SUPPOSITORY, RECTAL RECTAL DAILY PRN
Status: DISCONTINUED | OUTPATIENT
Start: 2022-11-25 | End: 2022-11-30

## 2022-11-25 RX ORDER — HYDROCODONE BITARTRATE AND ACETAMINOPHEN 7.5; 325 MG/1; MG/1
1 TABLET ORAL EVERY 4 HOURS PRN
Status: DISCONTINUED | OUTPATIENT
Start: 2022-11-25 | End: 2022-11-28

## 2022-11-25 RX ADMIN — ETOMIDATE 20 MG: 2 INJECTION, SOLUTION INTRAVENOUS at 07:11

## 2022-11-25 RX ADMIN — MIDAZOLAM 1.5 MG: 1 INJECTION INTRAMUSCULAR; INTRAVENOUS at 06:51

## 2022-11-25 RX ADMIN — PROTAMINE SULFATE 350 MG: 10 INJECTION, SOLUTION INTRAVENOUS at 11:58

## 2022-11-25 RX ADMIN — OXYCODONE 10 MG: 5 TABLET ORAL at 23:56

## 2022-11-25 RX ADMIN — DEXMEDETOMIDINE HYDROCHLORIDE 0.2 MCG/KG/HR: 100 INJECTION, SOLUTION INTRAVENOUS at 08:00

## 2022-11-25 RX ADMIN — FENTANYL CITRATE 500 MCG: 0.05 INJECTION, SOLUTION INTRAMUSCULAR; INTRAVENOUS at 07:11

## 2022-11-25 RX ADMIN — POTASSIUM CHLORIDE, DEXTROSE MONOHYDRATE AND SODIUM CHLORIDE 100 ML/HR: 150; 5; 450 INJECTION, SOLUTION INTRAVENOUS at 00:44

## 2022-11-25 RX ADMIN — MORPHINE SULFATE 2 MG: 2 INJECTION, SOLUTION INTRAMUSCULAR; INTRAVENOUS at 19:49

## 2022-11-25 RX ADMIN — MORPHINE SULFATE 2 MG: 2 INJECTION, SOLUTION INTRAMUSCULAR; INTRAVENOUS at 20:51

## 2022-11-25 RX ADMIN — ROCURONIUM BROMIDE 30 MG: 50 INJECTION, SOLUTION INTRAVENOUS at 09:10

## 2022-11-25 RX ADMIN — LIDOCAINE HYDROCHLORIDE 60 MG: 10 INJECTION, SOLUTION EPIDURAL; INFILTRATION; INTRACAUDAL; PERINEURAL at 07:11

## 2022-11-25 RX ADMIN — AMINOCAPROIC ACID 10 G: 250 INJECTION, SOLUTION INTRAVENOUS at 09:10

## 2022-11-25 RX ADMIN — DEXMEDETOMIDINE HYDROCHLORIDE 1.3 MCG/KG/HR: 4 INJECTION, SOLUTION INTRAVENOUS at 14:32

## 2022-11-25 RX ADMIN — ACETAMINOPHEN 650 MG: 325 TABLET ORAL at 21:18

## 2022-11-25 RX ADMIN — OXYCODONE 10 MG: 5 TABLET ORAL at 19:31

## 2022-11-25 RX ADMIN — MIDAZOLAM HYDROCHLORIDE 2 MG: 1 INJECTION, SOLUTION INTRAMUSCULAR; INTRAVENOUS at 09:11

## 2022-11-25 RX ADMIN — CALCIUM CHLORIDE 0.7 G: 100 INJECTION INTRAVENOUS; INTRAVENTRICULAR at 11:58

## 2022-11-25 RX ADMIN — FENTANYL CITRATE 250 MCG: 0.05 INJECTION, SOLUTION INTRAMUSCULAR; INTRAVENOUS at 07:57

## 2022-11-25 RX ADMIN — Medication 0.04 MCG/KG/MIN: at 07:31

## 2022-11-25 RX ADMIN — CEFAZOLIN SODIUM 2 G: 2 INJECTION, SOLUTION INTRAVENOUS at 19:57

## 2022-11-25 RX ADMIN — NITROGLYCERIN 1.5 MCG/KG/MIN: 20 INJECTION INTRAVENOUS at 07:48

## 2022-11-25 RX ADMIN — ALBUMIN (HUMAN) 250 ML: 12.5 INJECTION, SOLUTION INTRAVENOUS at 14:32

## 2022-11-25 RX ADMIN — ROCURONIUM BROMIDE 80 MG: 50 INJECTION, SOLUTION INTRAVENOUS at 07:13

## 2022-11-25 RX ADMIN — ROCURONIUM BROMIDE 50 MG: 50 INJECTION, SOLUTION INTRAVENOUS at 11:40

## 2022-11-25 RX ADMIN — POTASSIUM CHLORIDE AND DEXTROSE MONOHYDRATE 30 ML/HR: 150; 5 INJECTION, SOLUTION INTRAVENOUS at 14:33

## 2022-11-25 RX ADMIN — SODIUM CHLORIDE: 9 INJECTION, SOLUTION INTRAVENOUS at 07:06

## 2022-11-25 RX ADMIN — SODIUM BICARBONATE 50 MEQ: 84 INJECTION, SOLUTION INTRAVENOUS at 18:41

## 2022-11-25 RX ADMIN — NITROGLYCERIN 2 MCG/KG/MIN: 20 INJECTION INTRAVENOUS at 08:06

## 2022-11-25 RX ADMIN — PROPOFOL 20 MCG/KG/MIN: 10 INJECTION, EMULSION INTRAVENOUS at 14:33

## 2022-11-25 RX ADMIN — ASPIRIN 325 MG: 325 TABLET ORAL at 14:37

## 2022-11-25 RX ADMIN — CEFAZOLIN SODIUM 2 G: 1 INJECTION, POWDER, FOR SOLUTION INTRAMUSCULAR; INTRAVENOUS at 11:49

## 2022-11-25 RX ADMIN — AMINOCAPROIC ACID 10 G: 250 INJECTION, SOLUTION INTRAVENOUS at 11:58

## 2022-11-25 RX ADMIN — CHLORHEXIDINE GLUCONATE 15 ML: 1.2 SOLUTION ORAL at 05:11

## 2022-11-25 RX ADMIN — ATORVASTATIN CALCIUM 80 MG: 40 TABLET, FILM COATED ORAL at 20:04

## 2022-11-25 RX ADMIN — HEPARIN SODIUM 30000 UNITS: 1000 INJECTION, SOLUTION INTRAVENOUS; SUBCUTANEOUS at 09:10

## 2022-11-25 RX ADMIN — INSULIN HUMAN 2.1 UNITS/HR: 1 INJECTION, SOLUTION INTRAVENOUS at 15:13

## 2022-11-25 RX ADMIN — MUPIROCIN 1 APPLICATION: 20 OINTMENT TOPICAL at 05:12

## 2022-11-25 RX ADMIN — ROCURONIUM BROMIDE 20 MG: 50 INJECTION, SOLUTION INTRAVENOUS at 07:57

## 2022-11-25 RX ADMIN — MIDAZOLAM 3.5 MG: 1 INJECTION INTRAMUSCULAR; INTRAVENOUS at 07:11

## 2022-11-25 RX ADMIN — HEPARIN SODIUM 5000 UNITS: 1000 INJECTION, SOLUTION INTRAVENOUS; SUBCUTANEOUS at 08:45

## 2022-11-25 RX ADMIN — CEFAZOLIN SODIUM 2 G: 1 INJECTION, POWDER, FOR SOLUTION INTRAMUSCULAR; INTRAVENOUS at 06:39

## 2022-11-25 RX ADMIN — ALBUMIN HUMAN: 0.05 INJECTION, SOLUTION INTRAVENOUS at 12:11

## 2022-11-25 RX ADMIN — ALBUMIN (HUMAN) 250 ML: 12.5 INJECTION, SOLUTION INTRAVENOUS at 14:33

## 2022-11-25 RX ADMIN — FENTANYL CITRATE 250 MCG: 0.05 INJECTION, SOLUTION INTRAMUSCULAR; INTRAVENOUS at 08:06

## 2022-11-25 RX ADMIN — POTASSIUM PHOSPHATE, MONOBASIC AND POTASSIUM PHOSPHATE, DIBASIC 15 MMOL: 224; 236 INJECTION, SOLUTION, CONCENTRATE INTRAVENOUS at 17:06

## 2022-11-25 RX ADMIN — FENTANYL CITRATE 25 MCG: 50 INJECTION, SOLUTION INTRAMUSCULAR; INTRAVENOUS at 17:58

## 2022-11-25 RX ADMIN — ALBUMIN HUMAN: 0.05 INJECTION, SOLUTION INTRAVENOUS at 12:05

## 2022-11-25 RX ADMIN — HYDROCODONE BITARTRATE AND ACETAMINOPHEN 1 TABLET: 7.5; 325 TABLET ORAL at 22:01

## 2022-11-25 RX ADMIN — CHLORHEXIDINE GLUCONATE 15 ML: 1.2 SOLUTION ORAL at 20:04

## 2022-11-25 RX ADMIN — NICARDIPINE HYDROCHLORIDE 0.5 MG: 25 INJECTION INTRAVENOUS at 08:06

## 2022-11-25 RX ADMIN — SODIUM CHLORIDE: 9 INJECTION, SOLUTION INTRAVENOUS at 07:27

## 2022-11-25 NOTE — ANESTHESIA PROCEDURE NOTES
Emergent/Open-Heart Anesthesia GLENDA    Procedure Performed: Emergent/Open-Heart Anesthesia GLENDA       Start Time:        End Time:      Preanesthesia Checklist:  Patient identified, IV assessed, risks and benefits discussed, monitors and equipment assessed, procedure being performed at surgeon's request and anesthesia consent obtained.    General Procedure Information  Diagnostic Indications for Echo:  hemodynamic monitoring  Location performed:  OR  Intubated  Bite block placed  Heart visualized  Probe Insertion:  Easy  Probe Type:  Multiplane  Modalities:  2D only, color flow mapping, continuous wave Doppler and pulse wave Doppler        Anesthesia Information      Echocardiogram Comments:       Dx GLENDA performed by Shantal BAKER in OR for CABG  Surgeon: Dr. Maher    Preoperative informed consent obtained.  GLENDA probe passed without difficulty.      Baseline exam: LVEF 50 with apical/inferior/anterior wall hypokinesis.  RV size/function preserved.  No PFO nor SHARON clot.    Mild central MR (leaflet tips thickened), mild TR, mild PI, no significant AS (mean 2)/AI.  No dissection noted in imaged aorta regions; nonmobile plaquing of descending aorta present.   No significant effusions. Findings reviewed with surgeon in real time.    Post CPB:  S/p 5vCABG ;  Biventricular function stable on levophed inotropic support with depressed EF noted, improved with  residual anterior HoK - surgeon notified throughout.    no new significant valvular abnormalities.  L pleural effusion.   Aorta intact in visualized portions without evidence of dissection.  GLENDA probe gently removed without blood present on probe.

## 2022-11-25 NOTE — ANESTHESIA PROCEDURE NOTES
Central Line      Patient reassessed immediately prior to procedure    Patient location during procedure: OR  Indications: vascular access  Staff  Anesthesiologist: Nitza Sanches DO  Preanesthetic Checklist  Completed: patient identified, IV checked, site marked, risks and benefits discussed, surgical consent, monitors and equipment checked, pre-op evaluation and timeout performed  Central Line Prep  Sterile Tech:cap, gloves, gown, mask and sterile barriers  Prep: chloraprep  Patient monitoring: blood pressure monitoring, continuous pulse oximetry and EKG  Central Line Procedure  Laterality:right  Location:internal jugular  Catheter Type:MAC  Catheter Size:9 Fr  Guidance:ultrasound guided  PROCEDURE NOTE/ULTRASOUND INTERPRETATION.  Using ultrasound guidance the potential vascular sites for insertion of the catheter were visualized to determine the patency of the vessel to be used for vascular access.  After selecting the appropriate site for insertion, the needle was visualized under ultrasound being inserted into the internal jugular vein, followed by ultrasound confirmation of wire and catheter placement. There were no abnormalities seen on ultrasound; an image was taken; and the patient tolerated the procedure with no complications. Images: still images obtained, printed/placed on chart  Assessment  Post procedure:biopatch applied, line sutured, occlusive dressing applied and secured with tape  Assessement:blood return through all ports, free fluid flow, chest x-ray ordered and Sanjay Test  Complications:no  Patient Tolerance:patient tolerated the procedure well with no apparent complications  Additional Notes  Smooth US guided RIJMAC placement using sterile technique. + SLIC placement.  Negative sanjay x2.  Blood freely aspirates from all ports. CXR to follow in ICU.

## 2022-11-25 NOTE — ANESTHESIA PROCEDURE NOTES
Arterial Line      Patient reassessed immediately prior to procedure    Patient location during procedure: pre-op   Line placed for hemodynamic monitoring.  Performed By   Anesthesiologist: Nitza Sanches DO   Preanesthetic Checklist  Completed: patient identified, IV checked, site marked, risks and benefits discussed, surgical consent, monitors and equipment checked, pre-op evaluation and timeout performed  Arterial Line Prep    Sterile Tech: cap, gloves and sterile barriers  Prep: ChloraPrep  Patient monitoring: blood pressure monitoring, continuous pulse oximetry and EKG  Arterial Line Procedure   Laterality:right  Location:  radial artery  Catheter size: 20 G   Guidance: ultrasound guided and palpation technique  Number of attempts: 1  Successful placement: yes   Post Assessment   Dressing Type: line sutured, occlusive dressing applied, secured with tape and wrist guard applied.   Complications no  Circ/Move/Sens Assessment: normal and unchanged.   Patient Tolerance: patient tolerated the procedure well with no apparent complications

## 2022-11-25 NOTE — ANESTHESIA PREPROCEDURE EVALUATION
Anesthesia Evaluation     Patient summary reviewed and Nursing notes reviewed   no history of anesthetic complications:  NPO Solid Status: > 8 hours  NPO Liquid Status: > 2 hours           Airway   Mallampati: II  TM distance: >3 FB  Neck ROM: full  Possible difficult intubation and Anterior  Dental - normal exam     Pulmonary - normal exam   (-) asthma, sleep apnea, not a smoker  Cardiovascular - normal exam  Exercise tolerance: good (4-7 METS)    ECG reviewed  Patient on routine beta blocker and Beta blocker given within 24 hours of surgery    (+) hypertension, past MI , angina, hyperlipidemia,   (-) DVT    ROS comment: LAD 90% mid stenosis, OM 95% stenosis, RCA 80% distal stenosis, PDA 70% stenosis, second PDA 70% stenosis, posterolateral branch 95% stenosis, with EF estimated 50-55%.    SB 59 ekg    11/23/22-lvef 56-60, HoK basal AL/mid AL/basal IL and basal inferior;  No signif stenosis/regurg of valves    Neuro/Psych  (-) seizures, CVA  GI/Hepatic/Renal/Endo    (+)   renal disease stones,   (-) GERD, diabetes, no thyroid disorder    Musculoskeletal     Abdominal    Substance History - negative use     OB/GYN          Other        ROS/Med Hx Other: hgb 13.3 k 4.2    No evidence of acute aortic pathology. No evidence of thoracic aortic  aneurysm. The ascending aorta measures 3.6 cm in maximal transverse  Diameter.    No h/o pain or difficulty swallowing. No h/o esophageal nor gastric pathology nor procedures.                Anesthesia Plan    ASA 4     general, [place] and CVL     (Risks and benefits of general anesthesia discussed with patient (including MI, CVA, death, recall, aspiration, vision loss, oropharyngeal/dental damage), questions answered, agreeable to proceed. Risks of GLENDA discussed with patient (Op/dental damage, dysphagia, perforation,etc); questions answered, agreeable to proceed.  )  intravenous induction   Postoperative Plan: Expected vent after surgery  Anesthetic plan, risks, benefits, and  alternatives have been provided, discussed and informed consent has been obtained with: patient and spouse/significant other.    Use of blood products discussed with spouse/significant other and patient  Consented to blood products.       CODE STATUS:    Level Of Support Discussed With: Patient  Code Status (Patient has no pulse and is not breathing): CPR (Attempt to Resuscitate)  Medical Interventions (Patient has pulse or is breathing): Full Support

## 2022-11-25 NOTE — ANESTHESIA PROCEDURE NOTES
Airway  Urgency: elective    Date/Time: 11/25/2022 7:15 AM  Airway not difficult    General Information and Staff    Patient location during procedure: OR  Anesthesiologist: Nitza Sanches DO    Indications and Patient Condition  Indications for airway management: airway protection    Preoxygenated: yes  MILS not maintained throughout  Mask difficulty assessment: 2 - vent by mask + OA or adjuvant +/- NMBA    Final Airway Details  Final airway type: endotracheal airway      Successful airway: ETT  Cuffed: yes   Successful intubation technique: direct laryngoscopy  Facilitating devices/methods: intubating stylet  Endotracheal tube insertion site: oral  Blade: Shiva  Blade size: 4  ETT size (mm): 8.0  Cormack-Lehane Classification: grade IIb - view of arytenoids or posterior of glottis only  Placement verified by: chest auscultation and capnometry   Measured from: lips  ETT/EBT  to lips (cm): 22  Number of attempts at approach: 1  Assessment: lips, teeth, and gum same as pre-op and atraumatic intubation    Additional Comments  Patient history, labs, physical exam, anesthetic plan reviewed with patient in preop.  Pt transported to room via RN. All ASA monitors applied, preoxygenated x 3 min/ ETO2 of >85, IV patent, smooth induction, easy intubation, + ETCO2, negative epigastric sounds, breath sound equal bilaterally with symmetric chest rise and fall, tube secured, all VSS, cspine neutrality maintained throughout induction, intubation and postitioning, all ppp, arms <90.

## 2022-11-25 NOTE — ANESTHESIA POSTPROCEDURE EVALUATION
Patient: Cayden Nielsen    Procedure Summary     Date: 11/25/22 Room / Location:  [facility]  [facility]  [facility]    Anesthesia Start: 0629 Anesthesia Stop: 1307    Procedure: MEDIAN STERNOTOMY CORONARY ARTERY BYPASS GRAFTING X5 UTILIZING THE LEFT INTERNAL MAMMERY GRAFT, EVH OF THE GREATER RIGHT SAPHENOUS VEIN AND GLENDA PER ANESTHESIA (Chest) Diagnosis:     Surgeons: Jay Maher MD Provider: Nitza Sanches DO    Anesthesia Type: general, Megan, CVL ASA Status: 4          Anesthesia Type: general, Megan, CVL    Vitals  No vitals data found for the desired time range.          Post Anesthesia Care and Evaluation    Patient location during evaluation: ICU  Patient participation: complete - patient cannot participate  Level of consciousness: obtunded/minimal responses  Pain management: adequate    Airway patency: patent  Anesthetic complications: No anesthetic complications  PONV Status: none  Cardiovascular status: acceptable and hemodynamically stable  Respiratory status: acceptable, ETT, ventilator, intubated and airway suctioned  Hydration status: acceptable    Comments: Transported to ICU intubated and sedated on BMV and full monitors, pt remained hemodynamically stable throughout.  Report given to ICU team at bedside.   Remains on low 0.02 mcg/kg/min norepi and precedex.

## 2022-11-26 ENCOUNTER — APPOINTMENT (OUTPATIENT)
Dept: GENERAL RADIOLOGY | Facility: HOSPITAL | Age: 72
End: 2022-11-26

## 2022-11-26 PROBLEM — I16.0 HYPERTENSIVE URGENCY: Status: RESOLVED | Noted: 2022-11-22 | Resolved: 2022-11-26

## 2022-11-26 PROBLEM — I20.0 UNSTABLE ANGINA: Status: RESOLVED | Noted: 2022-11-22 | Resolved: 2022-11-26

## 2022-11-26 LAB
ALBUMIN SERPL-MCNC: 4 G/DL (ref 3.5–5.2)
ALBUMIN SERPL-MCNC: 4.2 G/DL (ref 3.5–5.2)
ANION GAP SERPL CALCULATED.3IONS-SCNC: 11 MMOL/L (ref 5–15)
ANION GAP SERPL CALCULATED.3IONS-SCNC: 9 MMOL/L (ref 5–15)
BASOPHILS # BLD AUTO: 0.03 10*3/MM3 (ref 0–0.2)
BASOPHILS NFR BLD AUTO: 0.2 % (ref 0–1.5)
BUN SERPL-MCNC: 12 MG/DL (ref 8–23)
BUN SERPL-MCNC: 15 MG/DL (ref 8–23)
BUN/CREAT SERPL: 11.1 (ref 7–25)
BUN/CREAT SERPL: 12.7 (ref 7–25)
CALCIUM SPEC-SCNC: 8 MG/DL (ref 8.6–10.5)
CALCIUM SPEC-SCNC: 8 MG/DL (ref 8.6–10.5)
CHLORIDE SERPL-SCNC: 100 MMOL/L (ref 98–107)
CHLORIDE SERPL-SCNC: 108 MMOL/L (ref 98–107)
CO2 SERPL-SCNC: 21 MMOL/L (ref 22–29)
CO2 SERPL-SCNC: 23 MMOL/L (ref 22–29)
CREAT SERPL-MCNC: 1.08 MG/DL (ref 0.76–1.27)
CREAT SERPL-MCNC: 1.18 MG/DL (ref 0.76–1.27)
DEPRECATED RDW RBC AUTO: 47.6 FL (ref 37–54)
EGFRCR SERPLBLD CKD-EPI 2021: 65.6 ML/MIN/1.73
EGFRCR SERPLBLD CKD-EPI 2021: 72.9 ML/MIN/1.73
EOSINOPHIL # BLD AUTO: 0 10*3/MM3 (ref 0–0.4)
EOSINOPHIL NFR BLD AUTO: 0 % (ref 0.3–6.2)
ERYTHROCYTE [DISTWIDTH] IN BLOOD BY AUTOMATED COUNT: 14.1 % (ref 12.3–15.4)
GLUCOSE BLDC GLUCOMTR-MCNC: 129 MG/DL (ref 70–130)
GLUCOSE BLDC GLUCOMTR-MCNC: 134 MG/DL (ref 70–130)
GLUCOSE BLDC GLUCOMTR-MCNC: 136 MG/DL (ref 70–130)
GLUCOSE BLDC GLUCOMTR-MCNC: 139 MG/DL (ref 70–130)
GLUCOSE BLDC GLUCOMTR-MCNC: 162 MG/DL (ref 70–130)
GLUCOSE BLDC GLUCOMTR-MCNC: 163 MG/DL (ref 70–130)
GLUCOSE BLDC GLUCOMTR-MCNC: 169 MG/DL (ref 70–130)
GLUCOSE BLDC GLUCOMTR-MCNC: 176 MG/DL (ref 70–130)
GLUCOSE SERPL-MCNC: 142 MG/DL (ref 65–99)
GLUCOSE SERPL-MCNC: 155 MG/DL (ref 65–99)
HCT VFR BLD AUTO: 27.2 % (ref 37.5–51)
HGB BLD-MCNC: 8.7 G/DL (ref 13–17.7)
IMM GRANULOCYTES # BLD AUTO: 0.1 10*3/MM3 (ref 0–0.05)
IMM GRANULOCYTES NFR BLD AUTO: 0.5 % (ref 0–0.5)
INR PPP: 1.4 (ref 0.84–1.13)
LYMPHOCYTES # BLD AUTO: 0.87 10*3/MM3 (ref 0.7–3.1)
LYMPHOCYTES NFR BLD AUTO: 4.7 % (ref 19.6–45.3)
MAGNESIUM SERPL-MCNC: 2.1 MG/DL (ref 1.6–2.4)
MAGNESIUM SERPL-MCNC: 2.1 MG/DL (ref 1.6–2.4)
MCH RBC QN AUTO: 29.6 PG (ref 26.6–33)
MCHC RBC AUTO-ENTMCNC: 32 G/DL (ref 31.5–35.7)
MCV RBC AUTO: 92.5 FL (ref 79–97)
MONOCYTES # BLD AUTO: 1.19 10*3/MM3 (ref 0.1–0.9)
MONOCYTES NFR BLD AUTO: 6.4 % (ref 5–12)
NEUTROPHILS NFR BLD AUTO: 16.29 10*3/MM3 (ref 1.7–7)
NEUTROPHILS NFR BLD AUTO: 88.2 % (ref 42.7–76)
NRBC BLD AUTO-RTO: 0 /100 WBC (ref 0–0.2)
PHOSPHATE SERPL-MCNC: 3.1 MG/DL (ref 2.5–4.5)
PHOSPHATE SERPL-MCNC: 3.8 MG/DL (ref 2.5–4.5)
PLATELET # BLD AUTO: 124 10*3/MM3 (ref 140–450)
PMV BLD AUTO: 10.9 FL (ref 6–12)
POTASSIUM SERPL-SCNC: 4.3 MMOL/L (ref 3.5–5.2)
POTASSIUM SERPL-SCNC: 4.6 MMOL/L (ref 3.5–5.2)
PROTHROMBIN TIME: 17.1 SECONDS (ref 11.4–14.4)
RBC # BLD AUTO: 2.94 10*6/MM3 (ref 4.14–5.8)
SODIUM SERPL-SCNC: 132 MMOL/L (ref 136–145)
SODIUM SERPL-SCNC: 140 MMOL/L (ref 136–145)
WBC NRBC COR # BLD: 18.48 10*3/MM3 (ref 3.4–10.8)

## 2022-11-26 PROCEDURE — 71045 X-RAY EXAM CHEST 1 VIEW: CPT

## 2022-11-26 PROCEDURE — 99233 SBSQ HOSP IP/OBS HIGH 50: CPT | Performed by: INTERNAL MEDICINE

## 2022-11-26 PROCEDURE — 25010000002 MORPHINE PER 10 MG: Performed by: STUDENT IN AN ORGANIZED HEALTH CARE EDUCATION/TRAINING PROGRAM

## 2022-11-26 PROCEDURE — 99232 SBSQ HOSP IP/OBS MODERATE 35: CPT | Performed by: INTERNAL MEDICINE

## 2022-11-26 PROCEDURE — 99024 POSTOP FOLLOW-UP VISIT: CPT | Performed by: STUDENT IN AN ORGANIZED HEALTH CARE EDUCATION/TRAINING PROGRAM

## 2022-11-26 PROCEDURE — 82962 GLUCOSE BLOOD TEST: CPT

## 2022-11-26 PROCEDURE — 85610 PROTHROMBIN TIME: CPT | Performed by: STUDENT IN AN ORGANIZED HEALTH CARE EDUCATION/TRAINING PROGRAM

## 2022-11-26 PROCEDURE — 97162 PT EVAL MOD COMPLEX 30 MIN: CPT

## 2022-11-26 PROCEDURE — 25010000002 CEFAZOLIN IN DEXTROSE 2-4 GM/100ML-% SOLUTION: Performed by: STUDENT IN AN ORGANIZED HEALTH CARE EDUCATION/TRAINING PROGRAM

## 2022-11-26 PROCEDURE — 80069 RENAL FUNCTION PANEL: CPT | Performed by: STUDENT IN AN ORGANIZED HEALTH CARE EDUCATION/TRAINING PROGRAM

## 2022-11-26 PROCEDURE — 83735 ASSAY OF MAGNESIUM: CPT | Performed by: STUDENT IN AN ORGANIZED HEALTH CARE EDUCATION/TRAINING PROGRAM

## 2022-11-26 PROCEDURE — 93005 ELECTROCARDIOGRAM TRACING: CPT | Performed by: STUDENT IN AN ORGANIZED HEALTH CARE EDUCATION/TRAINING PROGRAM

## 2022-11-26 PROCEDURE — 93010 ELECTROCARDIOGRAM REPORT: CPT | Performed by: INTERNAL MEDICINE

## 2022-11-26 PROCEDURE — 85025 COMPLETE CBC W/AUTO DIFF WBC: CPT | Performed by: STUDENT IN AN ORGANIZED HEALTH CARE EDUCATION/TRAINING PROGRAM

## 2022-11-26 PROCEDURE — 63710000001 INSULIN LISPRO (HUMAN) PER 5 UNITS: Performed by: INTERNAL MEDICINE

## 2022-11-26 PROCEDURE — 93005 ELECTROCARDIOGRAM TRACING: CPT | Performed by: PHYSICIAN ASSISTANT

## 2022-11-26 RX ORDER — AMIODARONE HYDROCHLORIDE 200 MG/1
200 TABLET ORAL EVERY 8 HOURS
Status: DISCONTINUED | OUTPATIENT
Start: 2022-11-28 | End: 2022-11-27

## 2022-11-26 RX ORDER — DEXTROSE MONOHYDRATE 25 G/50ML
25 INJECTION, SOLUTION INTRAVENOUS
Status: DISCONTINUED | OUTPATIENT
Start: 2022-11-26 | End: 2022-11-28

## 2022-11-26 RX ORDER — INSULIN LISPRO 100 [IU]/ML
0-7 INJECTION, SOLUTION INTRAVENOUS; SUBCUTANEOUS
Status: DISCONTINUED | OUTPATIENT
Start: 2022-11-26 | End: 2022-11-28

## 2022-11-26 RX ORDER — AMIODARONE HYDROCHLORIDE 200 MG/1
200 TABLET ORAL ONCE
Status: DISCONTINUED | OUTPATIENT
Start: 2022-11-27 | End: 2022-11-27

## 2022-11-26 RX ORDER — AMIODARONE HYDROCHLORIDE 200 MG/1
200 TABLET ORAL EVERY 12 HOURS
Status: DISCONTINUED | OUTPATIENT
Start: 2022-12-04 | End: 2022-11-27

## 2022-11-26 RX ORDER — AMIODARONE HYDROCHLORIDE 200 MG/1
200 TABLET ORAL DAILY
Status: DISCONTINUED | OUTPATIENT
Start: 2022-12-19 | End: 2022-11-27

## 2022-11-26 RX ORDER — NICOTINE POLACRILEX 4 MG
15 LOZENGE BUCCAL
Status: DISCONTINUED | OUTPATIENT
Start: 2022-11-26 | End: 2022-11-28

## 2022-11-26 RX ORDER — BUMETANIDE 0.25 MG/ML
1 INJECTION INTRAMUSCULAR; INTRAVENOUS ONCE
Status: COMPLETED | OUTPATIENT
Start: 2022-11-26 | End: 2022-11-26

## 2022-11-26 RX ADMIN — Medication 12.5 MG: at 20:15

## 2022-11-26 RX ADMIN — OXYCODONE 10 MG: 5 TABLET ORAL at 04:23

## 2022-11-26 RX ADMIN — CEFAZOLIN SODIUM 2 G: 2 INJECTION, SOLUTION INTRAVENOUS at 03:40

## 2022-11-26 RX ADMIN — OXYCODONE 10 MG: 5 TABLET ORAL at 12:27

## 2022-11-26 RX ADMIN — OXYCODONE 10 MG: 5 TABLET ORAL at 16:56

## 2022-11-26 RX ADMIN — CHLORHEXIDINE GLUCONATE 15 ML: 1.2 SOLUTION ORAL at 08:07

## 2022-11-26 RX ADMIN — OXYCODONE 10 MG: 5 TABLET ORAL at 23:58

## 2022-11-26 RX ADMIN — OXYCODONE 10 MG: 5 TABLET ORAL at 08:25

## 2022-11-26 RX ADMIN — ACETAMINOPHEN 650 MG: 325 TABLET ORAL at 13:39

## 2022-11-26 RX ADMIN — ATORVASTATIN CALCIUM 80 MG: 40 TABLET, FILM COATED ORAL at 20:15

## 2022-11-26 RX ADMIN — ACETAMINOPHEN 650 MG: 325 TABLET ORAL at 22:03

## 2022-11-26 RX ADMIN — CEFAZOLIN SODIUM 2 G: 2 INJECTION, SOLUTION INTRAVENOUS at 20:15

## 2022-11-26 RX ADMIN — BUMETANIDE 1 MG: 0.25 INJECTION, SOLUTION INTRAMUSCULAR; INTRAVENOUS at 22:03

## 2022-11-26 RX ADMIN — INSULIN LISPRO 2 UNITS: 100 INJECTION, SOLUTION INTRAVENOUS; SUBCUTANEOUS at 12:20

## 2022-11-26 RX ADMIN — MORPHINE SULFATE 2 MG: 2 INJECTION, SOLUTION INTRAMUSCULAR; INTRAVENOUS at 05:27

## 2022-11-26 RX ADMIN — ASPIRIN 325 MG ORAL TABLET 325 MG: 325 PILL ORAL at 08:07

## 2022-11-26 RX ADMIN — Medication 0.08 MCG/KG/MIN: at 05:40

## 2022-11-26 RX ADMIN — HYDROCODONE BITARTRATE AND ACETAMINOPHEN 1 TABLET: 7.5; 325 TABLET ORAL at 20:16

## 2022-11-26 RX ADMIN — CEFAZOLIN SODIUM 2 G: 2 INJECTION, SOLUTION INTRAVENOUS at 12:20

## 2022-11-26 RX ADMIN — ACETAMINOPHEN 650 MG: 325 TABLET ORAL at 05:41

## 2022-11-27 ENCOUNTER — APPOINTMENT (OUTPATIENT)
Dept: GENERAL RADIOLOGY | Facility: HOSPITAL | Age: 72
End: 2022-11-27

## 2022-11-27 LAB
ACT BLD: 121 SECONDS (ref 82–152)
ACT BLD: 161 SECONDS (ref 82–152)
ACT BLD: 370 SECONDS (ref 82–152)
ACT BLD: 405 SECONDS (ref 82–152)
ACT BLD: 439 SECONDS (ref 82–152)
ACT BLD: 463 SECONDS (ref 82–152)
ACT BLD: 526 SECONDS (ref 82–152)
ACT BLD: 538 SECONDS (ref 82–152)
ANION GAP SERPL CALCULATED.3IONS-SCNC: 11 MMOL/L (ref 5–15)
ANION GAP SERPL CALCULATED.3IONS-SCNC: 11 MMOL/L (ref 5–15)
BH BB BLOOD EXPIRATION DATE: NORMAL
BH BB BLOOD EXPIRATION DATE: NORMAL
BH BB BLOOD TYPE BARCODE: 5100
BH BB BLOOD TYPE BARCODE: 5100
BH BB DISPENSE STATUS: NORMAL
BH BB DISPENSE STATUS: NORMAL
BH BB PRODUCT CODE: NORMAL
BH BB PRODUCT CODE: NORMAL
BH BB UNIT NUMBER: NORMAL
BH BB UNIT NUMBER: NORMAL
BUN SERPL-MCNC: 17 MG/DL (ref 8–23)
BUN SERPL-MCNC: 23 MG/DL (ref 8–23)
BUN/CREAT SERPL: 16 (ref 7–25)
BUN/CREAT SERPL: 21.9 (ref 7–25)
CALCIUM SPEC-SCNC: 7.9 MG/DL (ref 8.6–10.5)
CALCIUM SPEC-SCNC: 7.9 MG/DL (ref 8.6–10.5)
CHLORIDE SERPL-SCNC: 90 MMOL/L (ref 98–107)
CHLORIDE SERPL-SCNC: 98 MMOL/L (ref 98–107)
CO2 SERPL-SCNC: 23 MMOL/L (ref 22–29)
CO2 SERPL-SCNC: 27 MMOL/L (ref 22–29)
CREAT SERPL-MCNC: 1.05 MG/DL (ref 0.76–1.27)
CREAT SERPL-MCNC: 1.06 MG/DL (ref 0.76–1.27)
CROSSMATCH INTERPRETATION: NORMAL
CROSSMATCH INTERPRETATION: NORMAL
DEPRECATED RDW RBC AUTO: 48.6 FL (ref 37–54)
EGFRCR SERPLBLD CKD-EPI 2021: 74.6 ML/MIN/1.73
EGFRCR SERPLBLD CKD-EPI 2021: 75.4 ML/MIN/1.73
ERYTHROCYTE [DISTWIDTH] IN BLOOD BY AUTOMATED COUNT: 14 % (ref 12.3–15.4)
GLUCOSE BLDC GLUCOMTR-MCNC: 129 MG/DL (ref 70–130)
GLUCOSE BLDC GLUCOMTR-MCNC: 143 MG/DL (ref 70–130)
GLUCOSE BLDC GLUCOMTR-MCNC: 144 MG/DL (ref 70–130)
GLUCOSE BLDC GLUCOMTR-MCNC: 147 MG/DL (ref 70–130)
GLUCOSE SERPL-MCNC: 131 MG/DL (ref 65–99)
GLUCOSE SERPL-MCNC: 137 MG/DL (ref 65–99)
HCT VFR BLD AUTO: 27.1 % (ref 37.5–51)
HGB BLD-MCNC: 8.6 G/DL (ref 13–17.7)
MAGNESIUM SERPL-MCNC: 2.6 MG/DL (ref 1.6–2.4)
MCH RBC QN AUTO: 30.1 PG (ref 26.6–33)
MCHC RBC AUTO-ENTMCNC: 31.7 G/DL (ref 31.5–35.7)
MCV RBC AUTO: 94.8 FL (ref 79–97)
PHOSPHATE SERPL-MCNC: 3.7 MG/DL (ref 2.5–4.5)
PLATELET # BLD AUTO: 110 10*3/MM3 (ref 140–450)
PMV BLD AUTO: 11.2 FL (ref 6–12)
POTASSIUM SERPL-SCNC: 3.5 MMOL/L (ref 3.5–5.2)
POTASSIUM SERPL-SCNC: 4.3 MMOL/L (ref 3.5–5.2)
QT INTERVAL: 0 MS
QT INTERVAL: 288 MS
QT INTERVAL: 358 MS
QT INTERVAL: 386 MS
QT INTERVAL: 400 MS
QTC INTERVAL: 0 MS
QTC INTERVAL: 420 MS
QTC INTERVAL: 434 MS
QTC INTERVAL: 444 MS
QTC INTERVAL: 467 MS
RBC # BLD AUTO: 2.86 10*6/MM3 (ref 4.14–5.8)
SODIUM SERPL-SCNC: 128 MMOL/L (ref 136–145)
SODIUM SERPL-SCNC: 132 MMOL/L (ref 136–145)
UNIT  ABO: NORMAL
UNIT  ABO: NORMAL
UNIT  RH: NORMAL
UNIT  RH: NORMAL
WBC NRBC COR # BLD: 15.66 10*3/MM3 (ref 3.4–10.8)

## 2022-11-27 PROCEDURE — 25010000002 AMIODARONE IN DEXTROSE 5% 150-4.21 MG/100ML-% SOLUTION: Performed by: STUDENT IN AN ORGANIZED HEALTH CARE EDUCATION/TRAINING PROGRAM

## 2022-11-27 PROCEDURE — 84100 ASSAY OF PHOSPHORUS: CPT | Performed by: STUDENT IN AN ORGANIZED HEALTH CARE EDUCATION/TRAINING PROGRAM

## 2022-11-27 PROCEDURE — 93005 ELECTROCARDIOGRAM TRACING: CPT | Performed by: PHYSICIAN ASSISTANT

## 2022-11-27 PROCEDURE — 93005 ELECTROCARDIOGRAM TRACING: CPT | Performed by: STUDENT IN AN ORGANIZED HEALTH CARE EDUCATION/TRAINING PROGRAM

## 2022-11-27 PROCEDURE — 85027 COMPLETE CBC AUTOMATED: CPT | Performed by: STUDENT IN AN ORGANIZED HEALTH CARE EDUCATION/TRAINING PROGRAM

## 2022-11-27 PROCEDURE — 80048 BASIC METABOLIC PNL TOTAL CA: CPT | Performed by: STUDENT IN AN ORGANIZED HEALTH CARE EDUCATION/TRAINING PROGRAM

## 2022-11-27 PROCEDURE — 71045 X-RAY EXAM CHEST 1 VIEW: CPT

## 2022-11-27 PROCEDURE — 93010 ELECTROCARDIOGRAM REPORT: CPT | Performed by: INTERNAL MEDICINE

## 2022-11-27 PROCEDURE — 99024 POSTOP FOLLOW-UP VISIT: CPT | Performed by: STUDENT IN AN ORGANIZED HEALTH CARE EDUCATION/TRAINING PROGRAM

## 2022-11-27 PROCEDURE — 99233 SBSQ HOSP IP/OBS HIGH 50: CPT | Performed by: INTERNAL MEDICINE

## 2022-11-27 PROCEDURE — 25010000002 CEFAZOLIN IN DEXTROSE 2-4 GM/100ML-% SOLUTION: Performed by: STUDENT IN AN ORGANIZED HEALTH CARE EDUCATION/TRAINING PROGRAM

## 2022-11-27 PROCEDURE — 82962 GLUCOSE BLOOD TEST: CPT

## 2022-11-27 PROCEDURE — 99232 SBSQ HOSP IP/OBS MODERATE 35: CPT | Performed by: INTERNAL MEDICINE

## 2022-11-27 PROCEDURE — 25010000002 AMIODARONE IN DEXTROSE 5% 360-4.14 MG/200ML-% SOLUTION: Performed by: STUDENT IN AN ORGANIZED HEALTH CARE EDUCATION/TRAINING PROGRAM

## 2022-11-27 PROCEDURE — 97530 THERAPEUTIC ACTIVITIES: CPT

## 2022-11-27 PROCEDURE — 83735 ASSAY OF MAGNESIUM: CPT | Performed by: STUDENT IN AN ORGANIZED HEALTH CARE EDUCATION/TRAINING PROGRAM

## 2022-11-27 RX ORDER — AMIODARONE HYDROCHLORIDE 200 MG/1
200 TABLET ORAL DAILY
Status: DISCONTINUED | OUTPATIENT
Start: 2022-12-19 | End: 2022-11-28

## 2022-11-27 RX ORDER — BUMETANIDE 0.25 MG/ML
1 INJECTION INTRAMUSCULAR; INTRAVENOUS
Status: COMPLETED | OUTPATIENT
Start: 2022-11-27 | End: 2022-11-27

## 2022-11-27 RX ORDER — AMIODARONE HYDROCHLORIDE 200 MG/1
200 TABLET ORAL EVERY 12 HOURS
Status: DISCONTINUED | OUTPATIENT
Start: 2022-12-05 | End: 2022-11-28

## 2022-11-27 RX ORDER — AMIODARONE HYDROCHLORIDE 200 MG/1
200 TABLET ORAL ONCE
Status: DISCONTINUED | OUTPATIENT
Start: 2022-11-28 | End: 2022-11-28

## 2022-11-27 RX ORDER — CLOPIDOGREL BISULFATE 75 MG/1
75 TABLET ORAL DAILY
Status: DISCONTINUED | OUTPATIENT
Start: 2022-11-27 | End: 2022-11-28

## 2022-11-27 RX ORDER — AMIODARONE HYDROCHLORIDE 200 MG/1
200 TABLET ORAL EVERY 8 HOURS
Status: DISCONTINUED | OUTPATIENT
Start: 2022-11-28 | End: 2022-11-28

## 2022-11-27 RX ADMIN — BUMETANIDE 1 MG: 0.25 INJECTION, SOLUTION INTRAMUSCULAR; INTRAVENOUS at 17:10

## 2022-11-27 RX ADMIN — CEFAZOLIN SODIUM 2 G: 2 INJECTION, SOLUTION INTRAVENOUS at 03:00

## 2022-11-27 RX ADMIN — ATORVASTATIN CALCIUM 80 MG: 40 TABLET, FILM COATED ORAL at 20:12

## 2022-11-27 RX ADMIN — ASPIRIN 325 MG ORAL TABLET 325 MG: 325 PILL ORAL at 08:40

## 2022-11-27 RX ADMIN — HYDROCODONE BITARTRATE AND ACETAMINOPHEN 1 TABLET: 7.5; 325 TABLET ORAL at 04:50

## 2022-11-27 RX ADMIN — ACETAMINOPHEN 650 MG: 325 TABLET ORAL at 22:13

## 2022-11-27 RX ADMIN — Medication 12.5 MG: at 20:11

## 2022-11-27 RX ADMIN — AMIODARONE HYDROCHLORIDE 1 MG/MIN: 1.8 INJECTION, SOLUTION INTRAVENOUS at 14:11

## 2022-11-27 RX ADMIN — OXYCODONE 10 MG: 5 TABLET ORAL at 13:39

## 2022-11-27 RX ADMIN — BUMETANIDE 1 MG: 0.25 INJECTION, SOLUTION INTRAMUSCULAR; INTRAVENOUS at 08:40

## 2022-11-27 RX ADMIN — OXYCODONE 10 MG: 5 TABLET ORAL at 08:39

## 2022-11-27 RX ADMIN — ACETAMINOPHEN 650 MG: 325 TABLET ORAL at 13:39

## 2022-11-27 RX ADMIN — Medication 12.5 MG: at 08:40

## 2022-11-27 RX ADMIN — ACETAMINOPHEN 650 MG: 325 TABLET ORAL at 05:59

## 2022-11-27 RX ADMIN — OXYCODONE 10 MG: 5 TABLET ORAL at 20:11

## 2022-11-27 RX ADMIN — AMIODARONE HYDROCHLORIDE 0.5 MG/MIN: 1.8 INJECTION, SOLUTION INTRAVENOUS at 20:12

## 2022-11-27 RX ADMIN — CLOPIDOGREL BISULFATE 75 MG: 75 TABLET ORAL at 12:11

## 2022-11-27 RX ADMIN — BUMETANIDE 1 MG: 0.25 INJECTION, SOLUTION INTRAMUSCULAR; INTRAVENOUS at 12:11

## 2022-11-27 RX ADMIN — AMIODARONE HYDROCHLORIDE 150 MG: 1.5 INJECTION, SOLUTION INTRAVENOUS at 14:08

## 2022-11-27 RX ADMIN — BUMETANIDE 1 MG: 0.25 INJECTION, SOLUTION INTRAMUSCULAR; INTRAVENOUS at 20:10

## 2022-11-28 LAB
ALBUMIN SERPL-MCNC: 3.7 G/DL (ref 3.5–5.2)
ALBUMIN/GLOB SERPL: 1.3 G/DL
ALP SERPL-CCNC: 104 U/L (ref 39–117)
ALT SERPL W P-5'-P-CCNC: 9 U/L (ref 1–41)
ANION GAP SERPL CALCULATED.3IONS-SCNC: 11 MMOL/L (ref 5–15)
ANION GAP SERPL CALCULATED.3IONS-SCNC: 16 MMOL/L (ref 5–15)
AST SERPL-CCNC: 26 U/L (ref 1–40)
BILIRUB SERPL-MCNC: 0.6 MG/DL (ref 0–1.2)
BUN SERPL-MCNC: 23 MG/DL (ref 8–23)
BUN SERPL-MCNC: 31 MG/DL (ref 8–23)
BUN/CREAT SERPL: 21.5 (ref 7–25)
BUN/CREAT SERPL: 27.2 (ref 7–25)
CALCIUM SPEC-SCNC: 8 MG/DL (ref 8.6–10.5)
CALCIUM SPEC-SCNC: 8.3 MG/DL (ref 8.6–10.5)
CHLORIDE SERPL-SCNC: 87 MMOL/L (ref 98–107)
CHLORIDE SERPL-SCNC: 93 MMOL/L (ref 98–107)
CO2 SERPL-SCNC: 26 MMOL/L (ref 22–29)
CO2 SERPL-SCNC: 27 MMOL/L (ref 22–29)
CREAT SERPL-MCNC: 1.07 MG/DL (ref 0.76–1.27)
CREAT SERPL-MCNC: 1.14 MG/DL (ref 0.76–1.27)
CYTO UR: NORMAL
DEPRECATED RDW RBC AUTO: 47 FL (ref 37–54)
EGFRCR SERPLBLD CKD-EPI 2021: 68.3 ML/MIN/1.73
EGFRCR SERPLBLD CKD-EPI 2021: 73.7 ML/MIN/1.73
ERYTHROCYTE [DISTWIDTH] IN BLOOD BY AUTOMATED COUNT: 13.9 % (ref 12.3–15.4)
GLOBULIN UR ELPH-MCNC: 2.9 GM/DL
GLUCOSE BLDC GLUCOMTR-MCNC: 115 MG/DL (ref 70–130)
GLUCOSE BLDC GLUCOMTR-MCNC: 126 MG/DL (ref 70–130)
GLUCOSE SERPL-MCNC: 115 MG/DL (ref 65–99)
GLUCOSE SERPL-MCNC: 133 MG/DL (ref 65–99)
HCT VFR BLD AUTO: 25.3 % (ref 37.5–51)
HGB BLD-MCNC: 8.2 G/DL (ref 13–17.7)
LAB AP CASE REPORT: NORMAL
LAB AP CLINICAL INFORMATION: NORMAL
MAGNESIUM SERPL-MCNC: 2.1 MG/DL (ref 1.6–2.4)
MCH RBC QN AUTO: 29.9 PG (ref 26.6–33)
MCHC RBC AUTO-ENTMCNC: 32.4 G/DL (ref 31.5–35.7)
MCV RBC AUTO: 92.3 FL (ref 79–97)
PATH REPORT.FINAL DX SPEC: NORMAL
PATH REPORT.GROSS SPEC: NORMAL
PHOSPHATE SERPL-MCNC: 4 MG/DL (ref 2.5–4.5)
PLATELET # BLD AUTO: 144 10*3/MM3 (ref 140–450)
PMV BLD AUTO: 11.3 FL (ref 6–12)
POTASSIUM SERPL-SCNC: 3.6 MMOL/L (ref 3.5–5.2)
POTASSIUM SERPL-SCNC: 4.2 MMOL/L (ref 3.5–5.2)
PROT SERPL-MCNC: 6.6 G/DL (ref 6–8.5)
QT INTERVAL: 302 MS
QT INTERVAL: 398 MS
QTC INTERVAL: 462 MS
QTC INTERVAL: 486 MS
RBC # BLD AUTO: 2.74 10*6/MM3 (ref 4.14–5.8)
SODIUM SERPL-SCNC: 130 MMOL/L (ref 136–145)
SODIUM SERPL-SCNC: 130 MMOL/L (ref 136–145)
WBC NRBC COR # BLD: 13.09 10*3/MM3 (ref 3.4–10.8)

## 2022-11-28 PROCEDURE — 87205 SMEAR GRAM STAIN: CPT | Performed by: INTERNAL MEDICINE

## 2022-11-28 PROCEDURE — 99024 POSTOP FOLLOW-UP VISIT: CPT | Performed by: STUDENT IN AN ORGANIZED HEALTH CARE EDUCATION/TRAINING PROGRAM

## 2022-11-28 PROCEDURE — 93005 ELECTROCARDIOGRAM TRACING: CPT | Performed by: STUDENT IN AN ORGANIZED HEALTH CARE EDUCATION/TRAINING PROGRAM

## 2022-11-28 PROCEDURE — 94799 UNLISTED PULMONARY SVC/PX: CPT

## 2022-11-28 PROCEDURE — 85027 COMPLETE CBC AUTOMATED: CPT | Performed by: STUDENT IN AN ORGANIZED HEALTH CARE EDUCATION/TRAINING PROGRAM

## 2022-11-28 PROCEDURE — 93010 ELECTROCARDIOGRAM REPORT: CPT | Performed by: INTERNAL MEDICINE

## 2022-11-28 PROCEDURE — 94761 N-INVAS EAR/PLS OXIMETRY MLT: CPT

## 2022-11-28 PROCEDURE — 99232 SBSQ HOSP IP/OBS MODERATE 35: CPT | Performed by: INTERNAL MEDICINE

## 2022-11-28 PROCEDURE — 82962 GLUCOSE BLOOD TEST: CPT

## 2022-11-28 PROCEDURE — 80053 COMPREHEN METABOLIC PANEL: CPT | Performed by: STUDENT IN AN ORGANIZED HEALTH CARE EDUCATION/TRAINING PROGRAM

## 2022-11-28 PROCEDURE — 84100 ASSAY OF PHOSPHORUS: CPT | Performed by: STUDENT IN AN ORGANIZED HEALTH CARE EDUCATION/TRAINING PROGRAM

## 2022-11-28 PROCEDURE — 0 POTASSIUM CHLORIDE PER 2 MEQ: Performed by: STUDENT IN AN ORGANIZED HEALTH CARE EDUCATION/TRAINING PROGRAM

## 2022-11-28 PROCEDURE — 87070 CULTURE OTHR SPECIMN AEROBIC: CPT | Performed by: INTERNAL MEDICINE

## 2022-11-28 PROCEDURE — 25010000002 AMIODARONE IN DEXTROSE 5% 360-4.14 MG/200ML-% SOLUTION: Performed by: STUDENT IN AN ORGANIZED HEALTH CARE EDUCATION/TRAINING PROGRAM

## 2022-11-28 PROCEDURE — 83735 ASSAY OF MAGNESIUM: CPT | Performed by: STUDENT IN AN ORGANIZED HEALTH CARE EDUCATION/TRAINING PROGRAM

## 2022-11-28 RX ORDER — AMIODARONE HYDROCHLORIDE 200 MG/1
200 TABLET ORAL EVERY 8 HOURS
Status: DISCONTINUED | OUTPATIENT
Start: 2022-11-28 | End: 2022-11-30 | Stop reason: HOSPADM

## 2022-11-28 RX ORDER — CLOPIDOGREL BISULFATE 75 MG/1
75 TABLET ORAL DAILY
Status: DISCONTINUED | OUTPATIENT
Start: 2022-11-29 | End: 2022-11-30 | Stop reason: HOSPADM

## 2022-11-28 RX ORDER — BUMETANIDE 0.25 MG/ML
1 INJECTION INTRAMUSCULAR; INTRAVENOUS EVERY 4 HOURS
Status: COMPLETED | OUTPATIENT
Start: 2022-11-28 | End: 2022-11-28

## 2022-11-28 RX ORDER — POTASSIUM CHLORIDE 1.5 G/1.77G
40 POWDER, FOR SOLUTION ORAL AS NEEDED
Status: DISCONTINUED | OUTPATIENT
Start: 2022-11-28 | End: 2022-11-30 | Stop reason: HOSPADM

## 2022-11-28 RX ORDER — AMIODARONE HYDROCHLORIDE 200 MG/1
200 TABLET ORAL ONCE
Status: DISCONTINUED | OUTPATIENT
Start: 2022-11-28 | End: 2022-11-28

## 2022-11-28 RX ORDER — POLYETHYLENE GLYCOL 3350 17 G/17G
17 POWDER, FOR SOLUTION ORAL DAILY PRN
Status: DISCONTINUED | OUTPATIENT
Start: 2022-11-28 | End: 2022-11-30 | Stop reason: HOSPADM

## 2022-11-28 RX ORDER — AMIODARONE HYDROCHLORIDE 200 MG/1
200 TABLET ORAL DAILY
Status: DISCONTINUED | OUTPATIENT
Start: 2022-12-19 | End: 2022-11-30 | Stop reason: HOSPADM

## 2022-11-28 RX ORDER — AMIODARONE HYDROCHLORIDE 200 MG/1
200 TABLET ORAL ONCE
Status: COMPLETED | OUTPATIENT
Start: 2022-11-28 | End: 2022-11-28

## 2022-11-28 RX ORDER — ASPIRIN 81 MG/1
81 TABLET, CHEWABLE ORAL DAILY
Status: DISCONTINUED | OUTPATIENT
Start: 2022-11-29 | End: 2022-11-28

## 2022-11-28 RX ORDER — AMOXICILLIN AND CLAVULANATE POTASSIUM 875; 125 MG/1; MG/1
1 TABLET, FILM COATED ORAL 2 TIMES DAILY
Status: DISCONTINUED | OUTPATIENT
Start: 2022-11-28 | End: 2022-11-30 | Stop reason: HOSPADM

## 2022-11-28 RX ORDER — ASPIRIN 81 MG/1
81 TABLET, CHEWABLE ORAL DAILY
Status: DISCONTINUED | OUTPATIENT
Start: 2022-11-29 | End: 2022-11-30 | Stop reason: HOSPADM

## 2022-11-28 RX ORDER — AMIODARONE HYDROCHLORIDE 200 MG/1
200 TABLET ORAL EVERY 12 HOURS
Status: DISCONTINUED | OUTPATIENT
Start: 2022-12-05 | End: 2022-11-30 | Stop reason: HOSPADM

## 2022-11-28 RX ORDER — AMIODARONE HYDROCHLORIDE 200 MG/1
200 TABLET ORAL DAILY
Status: DISCONTINUED | OUTPATIENT
Start: 2022-12-19 | End: 2022-11-28

## 2022-11-28 RX ORDER — HYDROCODONE BITARTRATE AND ACETAMINOPHEN 7.5; 325 MG/1; MG/1
1 TABLET ORAL EVERY 4 HOURS PRN
Status: DISCONTINUED | OUTPATIENT
Start: 2022-11-28 | End: 2022-11-30 | Stop reason: HOSPADM

## 2022-11-28 RX ORDER — ATORVASTATIN CALCIUM 40 MG/1
80 TABLET, FILM COATED ORAL NIGHTLY
Status: DISCONTINUED | OUTPATIENT
Start: 2022-11-28 | End: 2022-11-30 | Stop reason: HOSPADM

## 2022-11-28 RX ORDER — ACETAMINOPHEN 325 MG/1
650 TABLET ORAL EVERY 8 HOURS
Status: DISCONTINUED | OUTPATIENT
Start: 2022-11-28 | End: 2022-11-30

## 2022-11-28 RX ORDER — OXYCODONE HYDROCHLORIDE 5 MG/1
10 TABLET ORAL EVERY 4 HOURS PRN
Status: DISCONTINUED | OUTPATIENT
Start: 2022-11-28 | End: 2022-11-30

## 2022-11-28 RX ORDER — AMIODARONE HYDROCHLORIDE 200 MG/1
200 TABLET ORAL EVERY 12 HOURS
Status: DISCONTINUED | OUTPATIENT
Start: 2022-12-05 | End: 2022-11-28

## 2022-11-28 RX ORDER — AMIODARONE HYDROCHLORIDE 200 MG/1
200 TABLET ORAL EVERY 8 HOURS
Status: DISCONTINUED | OUTPATIENT
Start: 2022-11-28 | End: 2022-11-28

## 2022-11-28 RX ORDER — CLOPIDOGREL BISULFATE 75 MG/1
75 TABLET ORAL DAILY
Status: DISCONTINUED | OUTPATIENT
Start: 2022-11-29 | End: 2022-11-28

## 2022-11-28 RX ADMIN — AMIODARONE HYDROCHLORIDE 200 MG: 200 TABLET ORAL at 14:07

## 2022-11-28 RX ADMIN — CLOPIDOGREL BISULFATE 75 MG: 75 TABLET ORAL at 08:26

## 2022-11-28 RX ADMIN — Medication 12.5 MG: at 08:26

## 2022-11-28 RX ADMIN — ACETAMINOPHEN 650 MG: 325 TABLET ORAL at 06:09

## 2022-11-28 RX ADMIN — ACETAMINOPHEN 650 MG: 325 TABLET ORAL at 14:08

## 2022-11-28 RX ADMIN — POTASSIUM CHLORIDE 20 MEQ: 29.8 INJECTION, SOLUTION INTRAVENOUS at 01:49

## 2022-11-28 RX ADMIN — POTASSIUM CHLORIDE 20 MEQ: 29.8 INJECTION, SOLUTION INTRAVENOUS at 00:21

## 2022-11-28 RX ADMIN — AMIODARONE HYDROCHLORIDE 0.5 MG/MIN: 1.8 INJECTION, SOLUTION INTRAVENOUS at 07:49

## 2022-11-28 RX ADMIN — AMIODARONE HYDROCHLORIDE 200 MG: 200 TABLET ORAL at 21:01

## 2022-11-28 RX ADMIN — AMOXICILLIN AND CLAVULANATE POTASSIUM 1 TABLET: 875; 125 TABLET, FILM COATED ORAL at 12:59

## 2022-11-28 RX ADMIN — OXYCODONE 10 MG: 5 TABLET ORAL at 08:26

## 2022-11-28 RX ADMIN — ACETAMINOPHEN 650 MG: 325 TABLET ORAL at 21:01

## 2022-11-28 RX ADMIN — ASPIRIN 325 MG ORAL TABLET 325 MG: 325 PILL ORAL at 08:26

## 2022-11-28 RX ADMIN — ATORVASTATIN CALCIUM 80 MG: 40 TABLET, FILM COATED ORAL at 20:49

## 2022-11-28 RX ADMIN — AMOXICILLIN AND CLAVULANATE POTASSIUM 1 TABLET: 875; 125 TABLET, FILM COATED ORAL at 20:50

## 2022-11-28 RX ADMIN — BUMETANIDE 1 MG: 0.25 INJECTION INTRAMUSCULAR; INTRAVENOUS at 12:16

## 2022-11-28 RX ADMIN — BUMETANIDE 1 MG: 0.25 INJECTION INTRAMUSCULAR; INTRAVENOUS at 16:27

## 2022-11-28 RX ADMIN — HYDROCODONE BITARTRATE AND ACETAMINOPHEN 1 TABLET: 7.5; 325 TABLET ORAL at 06:09

## 2022-11-28 RX ADMIN — OXYCODONE 10 MG: 5 TABLET ORAL at 20:49

## 2022-11-28 RX ADMIN — Medication 12.5 MG: at 20:49

## 2022-11-28 RX ADMIN — BUMETANIDE 1 MG: 0.25 INJECTION INTRAMUSCULAR; INTRAVENOUS at 08:00

## 2022-11-28 RX ADMIN — BUMETANIDE 1 MG: 0.25 INJECTION INTRAMUSCULAR; INTRAVENOUS at 20:49

## 2022-11-29 ENCOUNTER — APPOINTMENT (OUTPATIENT)
Dept: GENERAL RADIOLOGY | Facility: HOSPITAL | Age: 72
End: 2022-11-29

## 2022-11-29 LAB
ALBUMIN SERPL-MCNC: 3.4 G/DL (ref 3.5–5.2)
ALBUMIN/GLOB SERPL: 1.2 G/DL
ALP SERPL-CCNC: 113 U/L (ref 39–117)
ALT SERPL W P-5'-P-CCNC: 9 U/L (ref 1–41)
ANION GAP SERPL CALCULATED.3IONS-SCNC: 12 MMOL/L (ref 5–15)
AST SERPL-CCNC: 29 U/L (ref 1–40)
BASE EXCESS BLDA CALC-SCNC: -1 MMOL/L (ref -5–5)
BASE EXCESS BLDA CALC-SCNC: -2 MMOL/L (ref -5–5)
BASE EXCESS BLDA CALC-SCNC: -4 MMOL/L (ref -5–5)
BASE EXCESS BLDA CALC-SCNC: 0 MMOL/L (ref -5–5)
BASE EXCESS BLDA CALC-SCNC: 1 MMOL/L (ref -5–5)
BASOPHILS # BLD AUTO: 0.03 10*3/MM3 (ref 0–0.2)
BASOPHILS NFR BLD AUTO: 0.2 % (ref 0–1.5)
BILIRUB SERPL-MCNC: 0.6 MG/DL (ref 0–1.2)
BUN SERPL-MCNC: 30 MG/DL (ref 8–23)
BUN/CREAT SERPL: 29.1 (ref 7–25)
CA-I BLDA-SCNC: 0.9 MMOL/L (ref 1.2–1.32)
CA-I BLDA-SCNC: 0.9 MMOL/L (ref 1.2–1.32)
CA-I BLDA-SCNC: 0.99 MMOL/L (ref 1.2–1.32)
CA-I BLDA-SCNC: 1.02 MMOL/L (ref 1.2–1.32)
CA-I BLDA-SCNC: 1.03 MMOL/L (ref 1.2–1.32)
CA-I BLDA-SCNC: 1.04 MMOL/L (ref 1.2–1.32)
CA-I BLDA-SCNC: 1.2 MMOL/L (ref 1.2–1.32)
CA-I BLDA-SCNC: 1.37 MMOL/L (ref 1.2–1.32)
CA-I BLDA-SCNC: 1.43 MMOL/L (ref 1.2–1.32)
CALCIUM SPEC-SCNC: 8.2 MG/DL (ref 8.6–10.5)
CHLORIDE SERPL-SCNC: 91 MMOL/L (ref 98–107)
CO2 BLDA-SCNC: 22 MMOL/L (ref 24–29)
CO2 BLDA-SCNC: 24 MMOL/L (ref 24–29)
CO2 BLDA-SCNC: 25 MMOL/L (ref 24–29)
CO2 BLDA-SCNC: 26 MMOL/L (ref 24–29)
CO2 BLDA-SCNC: 26 MMOL/L (ref 24–29)
CO2 BLDA-SCNC: 27 MMOL/L (ref 24–29)
CO2 BLDA-SCNC: 28 MMOL/L (ref 24–29)
CO2 SERPL-SCNC: 28 MMOL/L (ref 22–29)
CREAT SERPL-MCNC: 1.03 MG/DL (ref 0.76–1.27)
DEPRECATED RDW RBC AUTO: 44.6 FL (ref 37–54)
EGFRCR SERPLBLD CKD-EPI 2021: 77.2 ML/MIN/1.73
EOSINOPHIL # BLD AUTO: 0.1 10*3/MM3 (ref 0–0.4)
EOSINOPHIL NFR BLD AUTO: 0.7 % (ref 0.3–6.2)
ERYTHROCYTE [DISTWIDTH] IN BLOOD BY AUTOMATED COUNT: 13.6 % (ref 12.3–15.4)
GLOBULIN UR ELPH-MCNC: 2.8 GM/DL
GLUCOSE BLDC GLUCOMTR-MCNC: 127 MG/DL (ref 70–130)
GLUCOSE BLDC GLUCOMTR-MCNC: 139 MG/DL (ref 70–130)
GLUCOSE BLDC GLUCOMTR-MCNC: 141 MG/DL (ref 70–130)
GLUCOSE BLDC GLUCOMTR-MCNC: 143 MG/DL (ref 70–130)
GLUCOSE BLDC GLUCOMTR-MCNC: 148 MG/DL (ref 70–130)
GLUCOSE BLDC GLUCOMTR-MCNC: 151 MG/DL (ref 70–130)
GLUCOSE BLDC GLUCOMTR-MCNC: 159 MG/DL (ref 70–130)
GLUCOSE BLDC GLUCOMTR-MCNC: 167 MG/DL (ref 70–130)
GLUCOSE BLDC GLUCOMTR-MCNC: 167 MG/DL (ref 70–130)
GLUCOSE SERPL-MCNC: 156 MG/DL (ref 65–99)
HCO3 BLDA-SCNC: 21.4 MMOL/L (ref 22–26)
HCO3 BLDA-SCNC: 22.9 MMOL/L (ref 22–26)
HCO3 BLDA-SCNC: 24.1 MMOL/L (ref 22–26)
HCO3 BLDA-SCNC: 25.1 MMOL/L (ref 22–26)
HCO3 BLDA-SCNC: 25.4 MMOL/L (ref 22–26)
HCO3 BLDA-SCNC: 25.7 MMOL/L (ref 22–26)
HCO3 BLDA-SCNC: 25.9 MMOL/L (ref 22–26)
HCO3 BLDA-SCNC: 26 MMOL/L (ref 22–26)
HCO3 BLDA-SCNC: 26.3 MMOL/L (ref 22–26)
HCT VFR BLD AUTO: 26.3 % (ref 37.5–51)
HCT VFR BLDA CALC: 24 % (ref 38–51)
HCT VFR BLDA CALC: 25 % (ref 38–51)
HCT VFR BLDA CALC: 25 % (ref 38–51)
HCT VFR BLDA CALC: 26 % (ref 38–51)
HCT VFR BLDA CALC: 28 % (ref 38–51)
HCT VFR BLDA CALC: 28 % (ref 38–51)
HCT VFR BLDA CALC: 29 % (ref 38–51)
HCT VFR BLDA CALC: 30 % (ref 38–51)
HCT VFR BLDA CALC: 39 % (ref 38–51)
HGB BLD-MCNC: 8.7 G/DL (ref 13–17.7)
HGB BLDA-MCNC: 10.2 G/DL (ref 12–17)
HGB BLDA-MCNC: 13.3 G/DL (ref 12–17)
HGB BLDA-MCNC: 8.2 G/DL (ref 12–17)
HGB BLDA-MCNC: 8.5 G/DL (ref 12–17)
HGB BLDA-MCNC: 8.5 G/DL (ref 12–17)
HGB BLDA-MCNC: 8.8 G/DL (ref 12–17)
HGB BLDA-MCNC: 9.5 G/DL (ref 12–17)
HGB BLDA-MCNC: 9.5 G/DL (ref 12–17)
HGB BLDA-MCNC: 9.9 G/DL (ref 12–17)
IMM GRANULOCYTES # BLD AUTO: 0.12 10*3/MM3 (ref 0–0.05)
IMM GRANULOCYTES NFR BLD AUTO: 0.9 % (ref 0–0.5)
LYMPHOCYTES # BLD AUTO: 1.24 10*3/MM3 (ref 0.7–3.1)
LYMPHOCYTES NFR BLD AUTO: 9.1 % (ref 19.6–45.3)
MAGNESIUM SERPL-MCNC: 2.1 MG/DL (ref 1.6–2.4)
MCH RBC QN AUTO: 29.6 PG (ref 26.6–33)
MCHC RBC AUTO-ENTMCNC: 33.1 G/DL (ref 31.5–35.7)
MCV RBC AUTO: 89.5 FL (ref 79–97)
MONOCYTES # BLD AUTO: 1.04 10*3/MM3 (ref 0.1–0.9)
MONOCYTES NFR BLD AUTO: 7.6 % (ref 5–12)
NEUTROPHILS NFR BLD AUTO: 11.14 10*3/MM3 (ref 1.7–7)
NEUTROPHILS NFR BLD AUTO: 81.5 % (ref 42.7–76)
NRBC BLD AUTO-RTO: 0 /100 WBC (ref 0–0.2)
PCO2 BLDA: 35.3 MM HG (ref 35–45)
PCO2 BLDA: 36.2 MM HG (ref 35–45)
PCO2 BLDA: 36.9 MM HG (ref 35–45)
PCO2 BLDA: 37.2 MM HG (ref 35–45)
PCO2 BLDA: 41.8 MM HG (ref 35–45)
PCO2 BLDA: 44.2 MM HG (ref 35–45)
PCO2 BLDA: 45.2 MM HG (ref 35–45)
PCO2 BLDA: 47.2 MM HG (ref 35–45)
PCO2 BLDA: 48.2 MM HG (ref 35–45)
PH BLDA: 7.34 PH UNITS (ref 7.35–7.6)
PH BLDA: 7.35 PH UNITS (ref 7.35–7.6)
PH BLDA: 7.37 PH UNITS (ref 7.35–7.6)
PH BLDA: 7.42 PH UNITS (ref 7.35–7.6)
PH BLDA: 7.45 PH UNITS (ref 7.35–7.6)
PH BLDA: 7.45 PH UNITS (ref 7.35–7.6)
PHOSPHATE SERPL-MCNC: 3.9 MG/DL (ref 2.5–4.5)
PLATELET # BLD AUTO: 195 10*3/MM3 (ref 140–450)
PMV BLD AUTO: 11 FL (ref 6–12)
PO2 BLDA: 119 MMHG (ref 80–105)
PO2 BLDA: 327 MMHG (ref 80–105)
PO2 BLDA: 355 MMHG (ref 80–105)
PO2 BLDA: 361 MMHG (ref 80–105)
PO2 BLDA: 371 MMHG (ref 80–105)
PO2 BLDA: 40 MMHG (ref 80–105)
PO2 BLDA: 401 MMHG (ref 80–105)
PO2 BLDA: 462 MMHG (ref 80–105)
PO2 BLDA: 85 MMHG (ref 80–105)
POTASSIUM BLDA-SCNC: 3.9 MMOL/L (ref 3.5–4.9)
POTASSIUM BLDA-SCNC: 4 MMOL/L (ref 3.5–4.9)
POTASSIUM BLDA-SCNC: 4.1 MMOL/L (ref 3.5–4.9)
POTASSIUM BLDA-SCNC: 4.9 MMOL/L (ref 3.5–4.9)
POTASSIUM BLDA-SCNC: 5.5 MMOL/L (ref 3.5–4.9)
POTASSIUM BLDA-SCNC: 5.7 MMOL/L (ref 3.5–4.9)
POTASSIUM BLDA-SCNC: 5.8 MMOL/L (ref 3.5–4.9)
POTASSIUM BLDA-SCNC: 6.1 MMOL/L (ref 3.5–4.9)
POTASSIUM BLDA-SCNC: 6.1 MMOL/L (ref 3.5–4.9)
POTASSIUM SERPL-SCNC: 4.2 MMOL/L (ref 3.5–5.2)
POTASSIUM SERPL-SCNC: 4.2 MMOL/L (ref 3.5–5.2)
PROT SERPL-MCNC: 6.2 G/DL (ref 6–8.5)
QT INTERVAL: 396 MS
QTC INTERVAL: 476 MS
RBC # BLD AUTO: 2.94 10*6/MM3 (ref 4.14–5.8)
SAO2 % BLDA: 100 % (ref 95–98)
SAO2 % BLDA: 77 % (ref 95–98)
SAO2 % BLDA: 97 % (ref 95–98)
SAO2 % BLDA: 99 % (ref 95–98)
SODIUM BLD-SCNC: 134 MMOL/L (ref 138–146)
SODIUM BLD-SCNC: 134 MMOL/L (ref 138–146)
SODIUM BLD-SCNC: 135 MMOL/L (ref 138–146)
SODIUM BLD-SCNC: 136 MMOL/L (ref 138–146)
SODIUM BLD-SCNC: 137 MMOL/L (ref 138–146)
SODIUM BLD-SCNC: 138 MMOL/L (ref 138–146)
SODIUM BLD-SCNC: 140 MMOL/L (ref 138–146)
SODIUM SERPL-SCNC: 131 MMOL/L (ref 136–145)
WBC NRBC COR # BLD: 13.67 10*3/MM3 (ref 3.4–10.8)

## 2022-11-29 PROCEDURE — 80053 COMPREHEN METABOLIC PANEL: CPT | Performed by: INTERNAL MEDICINE

## 2022-11-29 PROCEDURE — 84100 ASSAY OF PHOSPHORUS: CPT | Performed by: INTERNAL MEDICINE

## 2022-11-29 PROCEDURE — 83735 ASSAY OF MAGNESIUM: CPT | Performed by: INTERNAL MEDICINE

## 2022-11-29 PROCEDURE — 99232 SBSQ HOSP IP/OBS MODERATE 35: CPT | Performed by: INTERNAL MEDICINE

## 2022-11-29 PROCEDURE — 93010 ELECTROCARDIOGRAM REPORT: CPT | Performed by: INTERNAL MEDICINE

## 2022-11-29 PROCEDURE — 71045 X-RAY EXAM CHEST 1 VIEW: CPT

## 2022-11-29 PROCEDURE — 97530 THERAPEUTIC ACTIVITIES: CPT

## 2022-11-29 PROCEDURE — 84132 ASSAY OF SERUM POTASSIUM: CPT

## 2022-11-29 PROCEDURE — 99024 POSTOP FOLLOW-UP VISIT: CPT | Performed by: STUDENT IN AN ORGANIZED HEALTH CARE EDUCATION/TRAINING PROGRAM

## 2022-11-29 PROCEDURE — 85025 COMPLETE CBC W/AUTO DIFF WBC: CPT | Performed by: STUDENT IN AN ORGANIZED HEALTH CARE EDUCATION/TRAINING PROGRAM

## 2022-11-29 PROCEDURE — 93005 ELECTROCARDIOGRAM TRACING: CPT | Performed by: STUDENT IN AN ORGANIZED HEALTH CARE EDUCATION/TRAINING PROGRAM

## 2022-11-29 RX ORDER — AMOXICILLIN 250 MG
2 CAPSULE ORAL 2 TIMES DAILY
Status: DISCONTINUED | OUTPATIENT
Start: 2022-11-29 | End: 2022-11-30 | Stop reason: HOSPADM

## 2022-11-29 RX ADMIN — SENNOSIDES AND DOCUSATE SODIUM 2 TABLET: 50; 8.6 TABLET ORAL at 14:04

## 2022-11-29 RX ADMIN — ACETAMINOPHEN 650 MG: 325 TABLET ORAL at 05:37

## 2022-11-29 RX ADMIN — ACETAMINOPHEN 650 MG: 325 TABLET ORAL at 14:04

## 2022-11-29 RX ADMIN — POTASSIUM CHLORIDE 40 MEQ: 1.5 POWDER, FOR SOLUTION ORAL at 00:50

## 2022-11-29 RX ADMIN — ATORVASTATIN CALCIUM 80 MG: 40 TABLET, FILM COATED ORAL at 20:29

## 2022-11-29 RX ADMIN — AMIODARONE HYDROCHLORIDE 200 MG: 200 TABLET ORAL at 14:04

## 2022-11-29 RX ADMIN — SENNOSIDES AND DOCUSATE SODIUM 2 TABLET: 50; 8.6 TABLET ORAL at 20:29

## 2022-11-29 RX ADMIN — AMOXICILLIN AND CLAVULANATE POTASSIUM 1 TABLET: 875; 125 TABLET, FILM COATED ORAL at 20:30

## 2022-11-29 RX ADMIN — ACETAMINOPHEN 650 MG: 325 TABLET ORAL at 20:29

## 2022-11-29 RX ADMIN — AMOXICILLIN AND CLAVULANATE POTASSIUM 1 TABLET: 875; 125 TABLET, FILM COATED ORAL at 08:35

## 2022-11-29 RX ADMIN — ASPIRIN 81 MG CHEWABLE TABLET 81 MG: 81 TABLET CHEWABLE at 08:35

## 2022-11-29 RX ADMIN — HYDROCODONE BITARTRATE AND ACETAMINOPHEN 1 TABLET: 7.5; 325 TABLET ORAL at 08:35

## 2022-11-29 RX ADMIN — CLOPIDOGREL BISULFATE 75 MG: 75 TABLET ORAL at 08:35

## 2022-11-29 RX ADMIN — Medication 12.5 MG: at 08:35

## 2022-11-29 RX ADMIN — METOPROLOL TARTRATE 25 MG: 25 TABLET, FILM COATED ORAL at 20:30

## 2022-11-29 RX ADMIN — AMIODARONE HYDROCHLORIDE 200 MG: 200 TABLET ORAL at 20:30

## 2022-11-29 RX ADMIN — AMIODARONE HYDROCHLORIDE 200 MG: 200 TABLET ORAL at 05:36

## 2022-11-30 ENCOUNTER — READMISSION MANAGEMENT (OUTPATIENT)
Dept: CALL CENTER | Facility: HOSPITAL | Age: 72
End: 2022-11-30

## 2022-11-30 ENCOUNTER — APPOINTMENT (OUTPATIENT)
Dept: GENERAL RADIOLOGY | Facility: HOSPITAL | Age: 72
End: 2022-11-30

## 2022-11-30 VITALS
BODY MASS INDEX: 24.33 KG/M2 | TEMPERATURE: 97.9 F | OXYGEN SATURATION: 96 % | RESPIRATION RATE: 18 BRPM | HEART RATE: 96 BPM | DIASTOLIC BLOOD PRESSURE: 76 MMHG | WEIGHT: 169.97 LBS | HEIGHT: 70 IN | SYSTOLIC BLOOD PRESSURE: 144 MMHG

## 2022-11-30 LAB
ANION GAP SERPL CALCULATED.3IONS-SCNC: 10 MMOL/L (ref 5–15)
BACTERIA SPEC RESP CULT: NORMAL
BASOPHILS # BLD AUTO: 0.05 10*3/MM3 (ref 0–0.2)
BASOPHILS NFR BLD AUTO: 0.4 % (ref 0–1.5)
BUN SERPL-MCNC: 28 MG/DL (ref 8–23)
BUN/CREAT SERPL: 26.2 (ref 7–25)
CALCIUM SPEC-SCNC: 8.4 MG/DL (ref 8.6–10.5)
CHLORIDE SERPL-SCNC: 94 MMOL/L (ref 98–107)
CO2 SERPL-SCNC: 29 MMOL/L (ref 22–29)
CREAT SERPL-MCNC: 1.07 MG/DL (ref 0.76–1.27)
DEPRECATED RDW RBC AUTO: 44.6 FL (ref 37–54)
EGFRCR SERPLBLD CKD-EPI 2021: 73.7 ML/MIN/1.73
EOSINOPHIL # BLD AUTO: 0.26 10*3/MM3 (ref 0–0.4)
EOSINOPHIL NFR BLD AUTO: 2.1 % (ref 0.3–6.2)
ERYTHROCYTE [DISTWIDTH] IN BLOOD BY AUTOMATED COUNT: 13.6 % (ref 12.3–15.4)
GLUCOSE SERPL-MCNC: 124 MG/DL (ref 65–99)
GRAM STN SPEC: NORMAL
HCT VFR BLD AUTO: 27.3 % (ref 37.5–51)
HGB BLD-MCNC: 8.8 G/DL (ref 13–17.7)
IMM GRANULOCYTES # BLD AUTO: 0.16 10*3/MM3 (ref 0–0.05)
IMM GRANULOCYTES NFR BLD AUTO: 1.3 % (ref 0–0.5)
LYMPHOCYTES # BLD AUTO: 1.39 10*3/MM3 (ref 0.7–3.1)
LYMPHOCYTES NFR BLD AUTO: 11.3 % (ref 19.6–45.3)
MAGNESIUM SERPL-MCNC: 2.3 MG/DL (ref 1.6–2.4)
MCH RBC QN AUTO: 28.9 PG (ref 26.6–33)
MCHC RBC AUTO-ENTMCNC: 32.2 G/DL (ref 31.5–35.7)
MCV RBC AUTO: 89.5 FL (ref 79–97)
MONOCYTES # BLD AUTO: 1.26 10*3/MM3 (ref 0.1–0.9)
MONOCYTES NFR BLD AUTO: 10.2 % (ref 5–12)
NEUTROPHILS NFR BLD AUTO: 74.7 % (ref 42.7–76)
NEUTROPHILS NFR BLD AUTO: 9.23 10*3/MM3 (ref 1.7–7)
NRBC BLD AUTO-RTO: 0.2 /100 WBC (ref 0–0.2)
PHOSPHATE SERPL-MCNC: 2.5 MG/DL (ref 2.5–4.5)
PLATELET # BLD AUTO: 262 10*3/MM3 (ref 140–450)
PMV BLD AUTO: 10.6 FL (ref 6–12)
POTASSIUM SERPL-SCNC: 3.7 MMOL/L (ref 3.5–5.2)
QT INTERVAL: 398 MS
QT INTERVAL: 414 MS
QTC INTERVAL: 369 MS
QTC INTERVAL: 492 MS
RBC # BLD AUTO: 3.05 10*6/MM3 (ref 4.14–5.8)
SODIUM SERPL-SCNC: 133 MMOL/L (ref 136–145)
WBC NRBC COR # BLD: 12.35 10*3/MM3 (ref 3.4–10.8)

## 2022-11-30 PROCEDURE — 97530 THERAPEUTIC ACTIVITIES: CPT

## 2022-11-30 PROCEDURE — 71045 X-RAY EXAM CHEST 1 VIEW: CPT

## 2022-11-30 PROCEDURE — 99024 POSTOP FOLLOW-UP VISIT: CPT | Performed by: STUDENT IN AN ORGANIZED HEALTH CARE EDUCATION/TRAINING PROGRAM

## 2022-11-30 PROCEDURE — 85025 COMPLETE CBC W/AUTO DIFF WBC: CPT | Performed by: INTERNAL MEDICINE

## 2022-11-30 PROCEDURE — 93010 ELECTROCARDIOGRAM REPORT: CPT | Performed by: INTERNAL MEDICINE

## 2022-11-30 PROCEDURE — 84100 ASSAY OF PHOSPHORUS: CPT | Performed by: INTERNAL MEDICINE

## 2022-11-30 PROCEDURE — 80048 BASIC METABOLIC PNL TOTAL CA: CPT | Performed by: INTERNAL MEDICINE

## 2022-11-30 PROCEDURE — 99232 SBSQ HOSP IP/OBS MODERATE 35: CPT | Performed by: INTERNAL MEDICINE

## 2022-11-30 PROCEDURE — 93005 ELECTROCARDIOGRAM TRACING: CPT | Performed by: STUDENT IN AN ORGANIZED HEALTH CARE EDUCATION/TRAINING PROGRAM

## 2022-11-30 PROCEDURE — 83735 ASSAY OF MAGNESIUM: CPT | Performed by: INTERNAL MEDICINE

## 2022-11-30 RX ORDER — HYDROCODONE BITARTRATE AND ACETAMINOPHEN 7.5; 325 MG/1; MG/1
1 TABLET ORAL EVERY 4 HOURS PRN
Qty: 20 TABLET | Refills: 0 | Status: SHIPPED | OUTPATIENT
Start: 2022-11-30 | End: 2023-03-02

## 2022-11-30 RX ORDER — AMIODARONE HYDROCHLORIDE 200 MG/1
200 TABLET ORAL EVERY 8 HOURS
Qty: 15 TABLET | Refills: 0 | Status: SHIPPED | OUTPATIENT
Start: 2022-11-30 | End: 2022-12-05

## 2022-11-30 RX ORDER — ATORVASTATIN CALCIUM 80 MG/1
80 TABLET, FILM COATED ORAL NIGHTLY
Qty: 90 TABLET | Refills: 4 | Status: SHIPPED | OUTPATIENT
Start: 2022-11-30 | End: 2023-03-02

## 2022-11-30 RX ORDER — AMIODARONE HYDROCHLORIDE 200 MG/1
200 TABLET ORAL EVERY 12 HOURS
Qty: 28 TABLET | Refills: 0 | Status: SHIPPED | OUTPATIENT
Start: 2022-12-05 | End: 2022-12-02

## 2022-11-30 RX ORDER — AMOXICILLIN AND CLAVULANATE POTASSIUM 875; 125 MG/1; MG/1
1 TABLET, FILM COATED ORAL 2 TIMES DAILY
Qty: 6 TABLET | Refills: 0 | Status: SHIPPED | OUTPATIENT
Start: 2022-11-30 | End: 2022-12-03

## 2022-11-30 RX ORDER — AMIODARONE HYDROCHLORIDE 200 MG/1
200 TABLET ORAL DAILY
Qty: 90 TABLET | Refills: 1 | Status: SHIPPED | OUTPATIENT
Start: 2022-12-19 | End: 2022-12-02

## 2022-11-30 RX ORDER — CLOPIDOGREL BISULFATE 75 MG/1
75 TABLET ORAL DAILY
Qty: 30 TABLET | Refills: 4 | Status: SHIPPED | OUTPATIENT
Start: 2022-11-30

## 2022-11-30 RX ADMIN — AMOXICILLIN AND CLAVULANATE POTASSIUM 1 TABLET: 875; 125 TABLET, FILM COATED ORAL at 08:42

## 2022-11-30 RX ADMIN — HYDROCODONE BITARTRATE AND ACETAMINOPHEN 1 TABLET: 7.5; 325 TABLET ORAL at 09:06

## 2022-11-30 RX ADMIN — AMIODARONE HYDROCHLORIDE 200 MG: 200 TABLET ORAL at 06:26

## 2022-11-30 RX ADMIN — SENNOSIDES AND DOCUSATE SODIUM 2 TABLET: 50; 8.6 TABLET ORAL at 08:17

## 2022-11-30 RX ADMIN — ASPIRIN 81 MG CHEWABLE TABLET 81 MG: 81 TABLET CHEWABLE at 08:16

## 2022-11-30 RX ADMIN — METOPROLOL TARTRATE 25 MG: 25 TABLET, FILM COATED ORAL at 08:17

## 2022-11-30 RX ADMIN — ACETAMINOPHEN 650 MG: 325 TABLET ORAL at 06:26

## 2022-11-30 RX ADMIN — CLOPIDOGREL BISULFATE 75 MG: 75 TABLET ORAL at 08:17

## 2022-11-30 NOTE — OUTREACH NOTE
BRONCHOSPASM/BRONCHOCONSTRICTION     [x]         IMPROVE AERATION/BREATH SOUNDS  [x]   ADMINISTER BRONCHODILATOR THERAPY AS APPROPRIATE  [x]   ASSESS BREATH SOUNDS  []   IMPLEMENT AEROSOL/MDI PROTOCOL  [x]   PATIENT EDUCATION AS NEEDED Prep Survey    Flowsheet Row Responses   [facility] patient discharged from? [place]   Is LACE score < 7 ? No   Emergency Room discharge w/ pulse ox? No   Eligibility [facility]   Date of Admission 11/22/22   Date of Discharge 11/30/22   Discharge Disposition Home or Self Care   Discharge diagnosis NSTEMI,   MEDIAN STERNOTOMY CORONARY ARTERY BYPASS GRAFTING X5   Does the patient have one of the following disease processes/diagnoses(primary or secondary)? Cardiothoracic surgery   Does the patient have Home health ordered? No   Is there a DME ordered? No   Prep survey completed? Yes          YUN AGUILLON - Registered Nurse

## 2022-12-01 ENCOUNTER — TRANSITIONAL CARE MANAGEMENT TELEPHONE ENCOUNTER (OUTPATIENT)
Dept: CALL CENTER | Facility: HOSPITAL | Age: 72
End: 2022-12-01

## 2022-12-01 NOTE — OUTREACH NOTE
Call Center TCM Note    Flowsheet Row Responses   [facility] facility patient discharged from? [place]   Does the patient have one of the following disease processes/diagnoses(primary or secondary)? Cardiothoracic surgery   TCM attempt successful? No   Unsuccessful attempts Attempt 1          Bridgette Ashley RN    12/1/2022, 12:45 EST

## 2022-12-01 NOTE — OUTREACH NOTE
Call Center TCM Note    Flowsheet Row Responses   [facility] facility patient discharged from? [place]   Does the patient have one of the following disease processes/diagnoses(primary or secondary)? Cardiothoracic surgery   TCM attempt successful? No   Unsuccessful attempts Attempt 2          Carol Hopkins RN    12/1/2022, 16:45 EST

## 2022-12-02 ENCOUNTER — OFFICE VISIT (OUTPATIENT)
Dept: FAMILY MEDICINE CLINIC | Facility: CLINIC | Age: 72
End: 2022-12-02

## 2022-12-02 ENCOUNTER — TRANSITIONAL CARE MANAGEMENT TELEPHONE ENCOUNTER (OUTPATIENT)
Dept: CALL CENTER | Facility: HOSPITAL | Age: 72
End: 2022-12-02

## 2022-12-02 VITALS
SYSTOLIC BLOOD PRESSURE: 124 MMHG | RESPIRATION RATE: 16 BRPM | HEART RATE: 76 BPM | OXYGEN SATURATION: 96 % | WEIGHT: 178 LBS | HEIGHT: 70 IN | BODY MASS INDEX: 25.48 KG/M2 | DIASTOLIC BLOOD PRESSURE: 74 MMHG | TEMPERATURE: 97.8 F

## 2022-12-02 DIAGNOSIS — E78.00 HIGH CHOLESTEROL: ICD-10-CM

## 2022-12-02 DIAGNOSIS — I21.4 NSTEMI (NON-ST ELEVATED MYOCARDIAL INFARCTION): ICD-10-CM

## 2022-12-02 DIAGNOSIS — F51.01 PRIMARY INSOMNIA: ICD-10-CM

## 2022-12-02 DIAGNOSIS — Z09 HOSPITAL DISCHARGE FOLLOW-UP: Primary | ICD-10-CM

## 2022-12-02 PROCEDURE — 1111F DSCHRG MED/CURRENT MED MERGE: CPT | Performed by: FAMILY MEDICINE

## 2022-12-02 PROCEDURE — 99496 TRANSJ CARE MGMT HIGH F2F 7D: CPT | Performed by: FAMILY MEDICINE

## 2022-12-02 RX ORDER — TRAZODONE HYDROCHLORIDE 100 MG/1
50-100 TABLET ORAL NIGHTLY
Qty: 30 TABLET | Refills: 2 | Status: SHIPPED | OUTPATIENT
Start: 2022-12-02 | End: 2023-03-02

## 2022-12-02 NOTE — OUTREACH NOTE
Call Center TCM Note    Flowsheet Row Responses   [facility] patient discharged from? [place]   Does the patient have one of the following disease processes/diagnoses(primary or secondary)? Cardiothoracic surgery   TCM attempt successful? Yes   Call start time 0947   Call end time 0952   Discharge diagnosis NSTEMI,   MEDIAN STERNOTOMY CORONARY ARTERY BYPASS GRAFTING X5   Is patient permission given to speak with other caregiver? Yes   List who call center can speak with Paige-spouse   Person spoke with today (if not patient) and relationship Paige-Spouse   Meds reviewed with patient/caregiver? Yes   Is the patient having any side effects they believe may be caused by any medication additions or changes? No   Does the patient have all medications related to this admission filled (includes all antibiotics, pain medications, cardiac medications, etc.) Yes   Is the patient taking all medications as directed (includes completed medication regime)? Yes   Comments Hospital d/c follow up completed today   Does the patient have an appointment with their PCP within 7 days of discharge? Yes   Has home health visited the patient within 72 hours of discharge? N/A   Psychosocial issues? No   Did the patient receive a copy of their discharge instructions? Yes   Nursing interventions Reviewed instructions with patient   What is the patient's perception of their health status since discharge? Improving   Nursing interventions Nurse provided patient education   Is the patient/caregiver able to teach back normal signs of recovery? Nausea and lack of appetite, Depression or irritability, Constipation, Pain or discomfort at incisional site   Nursing interventions Reassured on normal signs of recovery   Is the patient /caregiver able to teach back basic post-op care? Continue use of incentive spirometry at least 1 week post discharge, Practice cough and deep breath every 4 hours while awake, Hold pillow to support chest when  coughing, Drive as instructed by MD in discharge instructions, Lifting as instructed by MD in discharge instructions, No tub bath, swimming, or hot tub until instructed by MD   Is the patient/caregiver able to teach back signs and symptoms of incisional infection? Increased redness, swelling or pain at the incisonal site, Increased drainage or bleeding, Incisional warmth, Pus or odor from incision, Fever   Is the patient/caregiver able to teach back steps to recovery at home? Set small, achievable goals for return to baseline health, Rest and rebuild strength, gradually increase activity, Eat a well-balance diet   Is the patient /caregiver able to teach back the importance of cardiac rehab? Yes   Nursing interventions Provided education on importance of cardiac rehab   If the patient is a current smoker, are they able to teach back resources for cessation? Not a smoker   Is the patient/caregiver able to teach back the hierarchy of who to call/visit for symptoms/problems? PCP, Specialist, Home health nurse, Urgent Care, ED, 911 Yes   TCM call completed? Yes   Call end time 0952   Would this patient benefit from a Referral to [facility] Social Work? No   Is the patient interested in additional calls from an ambulatory ?  NOTE:  applies to high risk patients requiring additional follow-up. No          Anita Larios RN    12/2/2022, 09:53 EST

## 2022-12-02 NOTE — PROGRESS NOTES
"Enter Query Response Below      Query Response:       I did also perform thymectomy during the coronary artery bypass operation       If applicable, please update the problem list.             Patient: Cayden Nielsen        : 1950  Account: [id number]           Admit Date: 2022        How to Respond to this query:       a. Click New Note     b. Answer query within the yellow box.                c. Update the Problem List, if applicable.      If you have any questions about this query contact me at: Sincerely,    Bird Batista RN, BSN  Clinical Documentation Integrity  [facility]  [email or web address]      ,     72 year old male with history of hypertension presented with NSTEMI and was found to have multivessel coronary artery disease per heart cath.  Operative note 2022 includes  \"Median sternotomy, coronary artery bypass grafting x5 utilizing the left internal mammary graft, EVH of the greater right saphenous vein and GLENDA per anesthesia.\"  Record also contains pathological findings for \"Thymus, thymectomy\" dated 2022, with no documentation of this procedure included per the CABG operative note.      After study, please clarify procedure(s) performed in this encounter.  Thank you!          By submitting this query, we are merely seeking further clarification of documentation to accurately reflect all conditions that you are monitoring, evaluating, treating or that extend the hospitalization or utilize additional resources of care. Please utilize your independent clinical judgment when addressing the question(s) above.     This query and your response, once completed, will be entered into the legal medical record.    "

## 2022-12-02 NOTE — PROGRESS NOTES
Transitional Care Follow Up Visit  Subjective     Cayden Nielsen is a 72 y.o. male who presents for a transitional care management visit.        Within 48 business hours after discharge our office contacted him via telephone to coordinate his care and needs.      I reviewed and discussed the details of that call along with the discharge summary, hospital problems, inpatient lab results, inpatient diagnostic studies, and consultation reports with Cayden.     Current outpatient and discharge medications have been reconciled for the patient.  Reviewed by: Vannessa Tuttle DO      Date of TCM Phone Call 11/30/2022   [facility]   Date of Admission 11/22/2022   Date of Discharge 11/30/2022   Discharge Disposition Home or Self Care     Risk for Readmission (LACE) Score: 9 (11/30/2022  6:00 AM)      History of Present Illness   Course During Hospital Stay: The patient presents today for a hospital follow-up after having NSTEMI. He is status post CABG x 5 vessel on 11/25/2022. Admission date was 11/22/2022 and date of discharge was 11/30/2022. He has a follow-up scheduled with the cardiothoracic surgery office on 12/29/2022. He is accompanied by his daughter Hien.     The patient suffered an NSTEMI on 11/22/2022, symptoms began while he was working at [employer]. He experienced chest pain and was sent home early. Went to  ED and had left heart cath on 11/23. Due to severity of CAD, advised CABG to treat. since his surgery, he reports soreness and tenderness. He notes difficulty eating, his daughter reports he does not have much of an appetite and he feels like food is getting stuck in his throat. He is crushing his larger pills due to the difficulty swallowing. The patient is not taking any pain medication, even though he knows he probably needs to. He does not have a scheduled appointment with Dr. Olvera at this time for follow up. The patient had a episode of atrial fibrillation. He is taking amiodarone  three times a day at this time, but he is on a taper schedule. He was cleared to shower, his daughter notes the wound is healing well.     He is having difficulty sleeping. He has not tried any prescription medication to aid with sleep. He has tried melatonin in the past and states it is not effective. The patient notes a history of sleep issues, even prior to recent hospital stay and surgery.      He complains of sinus drainage. He has 2 days left of the Augmentin, being treated for pneumonia. He had a chest x-ray on 11/30/2022, he notes he had a chest x-ray every morning. The chest x-ray on 11/30 showed an effusion on the left side and the right side showed atelectasis, Left basilar opacity not significantly changed. He has pain with coughing. He is coughing up phlegm. The patient has a spirometer, he is using it hourly.     The patients blood pressure was elevated while in the hospital, it is within normal limits today.     His hemoglobin was decreased on his lab work. His electrolytes were slightly abnormal.     The following portions of the patient's history were reviewed and updated as appropriate: allergies, current medications, past family history, past medical history, past social history, past surgical history and problem list.      Objective   Physical Exam  Vitals reviewed.   Constitutional:       General: He is not in acute distress.     Appearance: He is well-developed.   HENT:      Head: Normocephalic and atraumatic.   Cardiovascular:      Rate and Rhythm: Normal rate and regular rhythm.      Heart sounds: Normal heart sounds.   Pulmonary:      Effort: Pulmonary effort is normal.      Breath sounds: Normal breath sounds. No rhonchi.   Musculoskeletal:      Comments: Ambulates with walker   Skin:            Comments: Surgical incision healing, minimal erythema. No drainage.   Neurological:      Mental Status: He is alert and oriented to person, place, and time.         Assessment & Plan   Diagnoses and  all orders for this visit:    1. Hospital discharge follow-up (Primary)  Comments:  - Repeat chest x-ray. - Recommend the patient follow-up with Dr. Olvera.   - Recommend patient use pain medication as needed.   - Repeat lab work.   Orders:  -     CBC (No Diff)  -     Basic Metabolic Panel  -     XR Chest PA & Lateral    2. NSTEMI s/p CABG 11/25/2022  Comments:  Continue statin, betablocker, ASA, Plavix  Orders:  -     CBC (No Diff)  -     Basic Metabolic Panel  -     XR Chest PA & Lateral    3. High cholesterol  -     CBC (No Diff)  -     Basic Metabolic Panel  -     XR Chest PA & Lateral    4. Primary insomnia  Comments:  Prescribe trazadone.   Orders:  -     CBC (No Diff)  -     Basic Metabolic Panel  -     traZODone (DESYREL) 100 MG tablet; Take 0.5-1 tablets by mouth Every Night.  Dispense: 30 tablet; Refill: 2  -     XR Chest PA & Lateral        Transcribed from ambient dictation for Vannessa Tuttle DO by Homa Foy.  12/02/22   11:26 EST    Patient or patient representative verbalized consent to the visit recording.  I have personally performed the services described in this document as transcribed by the above individual, and it is both accurate and complete.  Vannessa Tuttle DO  12/2/2022  13:43 EST

## 2022-12-02 NOTE — DISCHARGE SUMMARY
Cayden Nielsen is a 72 y.o. male with history of hypertension, hyperlipidemia, tuberculosis, presents to the ED on 11/22/2022 with complaints of chest pain and found to have NSTEMI. He underwent angioplasty of diagonal artery on 11/23/2022 by Dr. Cortez. Hypertension managed medically. He underwent uncomplicated CABG x5 with right greater saphenous vein harvest on 11/25/2022 after delay for Plavix preop. Concern for possible pneumonia postop, PO augmentin x5 days. Postop Afib mananged with amiodarone IV transitioned to PO, discharged in sinus rhythm.     Jay Maher M.D., R.P.V.I.  Cardiothoracic and Vascular Surgeon  [facility]

## 2022-12-03 LAB
BUN SERPL-MCNC: 16 MG/DL (ref 8–23)
BUN/CREAT SERPL: 14.2 (ref 7–25)
CALCIUM SERPL-MCNC: 8.5 MG/DL (ref 8.6–10.5)
CHLORIDE SERPL-SCNC: 94 MMOL/L (ref 98–107)
CO2 SERPL-SCNC: 28.1 MMOL/L (ref 22–29)
CREAT SERPL-MCNC: 1.13 MG/DL (ref 0.76–1.27)
EGFRCR SERPLBLD CKD-EPI 2021: 69.1 ML/MIN/1.73
ERYTHROCYTE [DISTWIDTH] IN BLOOD BY AUTOMATED COUNT: 13.2 % (ref 12.3–15.4)
GLUCOSE SERPL-MCNC: 129 MG/DL (ref 65–99)
HCT VFR BLD AUTO: 29.4 % (ref 37.5–51)
HGB BLD-MCNC: 9.7 G/DL (ref 13–17.7)
MCH RBC QN AUTO: 28.9 PG (ref 26.6–33)
MCHC RBC AUTO-ENTMCNC: 33 G/DL (ref 31.5–35.7)
MCV RBC AUTO: 87.5 FL (ref 79–97)
PLATELET # BLD AUTO: 478 10*3/MM3 (ref 140–450)
POTASSIUM SERPL-SCNC: 3.8 MMOL/L (ref 3.5–5.2)
RBC # BLD AUTO: 3.36 10*6/MM3 (ref 4.14–5.8)
SODIUM SERPL-SCNC: 137 MMOL/L (ref 136–145)
WBC # BLD AUTO: 14.46 10*3/MM3 (ref 3.4–10.8)

## 2022-12-06 ENCOUNTER — HOSPITAL ENCOUNTER (OUTPATIENT)
Dept: GENERAL RADIOLOGY | Facility: HOSPITAL | Age: 72
Discharge: HOME | End: 2022-12-06
Admitting: FAMILY MEDICINE

## 2022-12-06 PROCEDURE — 71046 X-RAY EXAM CHEST 2 VIEWS: CPT

## 2022-12-08 NOTE — DISCHARGE SUMMARY
[facility] Cardiothoracic Surgery Discharge Summary    Name:  Cayden Nielsen  MRN Number:  [id number]  Date of Admission: 11/22/2022  Date of Discharge:  11/30/2022    Referred By: No ref. provider found  PCP: Vannessa Tuttle DO  IP Care Team:  Patient Care Team:  Vannessa Tuttle DO as PCP - General (Family Medicine)  John Cortez MD as Consulting Physician (Cardiology)    Discharge Diagnosis:  Past Medical History:   Diagnosis Date   • Bruises easily    • Hypertension    • Kidney stone    • Kidney stone    • Tuberculosis      Active Hospital Problems    Diagnosis  POA   • **NSTEMI s/p CABG 11/25/2022 [I21.4]  Yes   • Hyperlipidemia LDL goal <70 [E78.5]  Yes      Resolved Hospital Problems    Diagnosis Date Resolved POA   • Unstable angina (HCC) [I20.0] 11/26/2022 Yes   • Hypertensive urgency [I16.0] 11/26/2022 Yes     Unstable angina (HCC) [I20.0]    Procedures Performed:  Procedure(s):  MEDIAN STERNOTOMY CORONARY ARTERY BYPASS GRAFTING X5 UTILIZING THE LEFT INTERNAL MAMMERY GRAFT, EVH OF THE GREATER RIGHT SAPHENOUS VEIN AND GLENDA PER ANESTHESIA       Operative Pathology:    Clinical Information    Coronary artery calcification, unstable angina, hypertensive emergency, atherosclerotic cardiovascular disease, hyperlipidemia, NSTEMI   Final Diagnosis   THYMUS, THYMECTOMY:  Fragments of mature fibroadipose tissue and minimal thymic tissue present  No evidence of malignancy identified     History of Present Illness:    Mr. Cayden Nielsen is a 71yo male with PMH of HTN and HLD who presented yesterday to the ED with severe chest pain and diaphoresis. Initial troponin was negative but patient was admitted for further evaluation and subsequent troponins were elevated. Patient was given aspirin and then Plavix 600mg after admission as well as 2 additional doses of Plavix 75mg. Last dose was today at 1613.   Patient was taken for left heart catheterization today with Dr. Cortez which revealed LAD 90% mid stenosis, OM 95%  stenosis, RCA 80% distal stenosis, PDA 70% stenosis, second PDA 70% stenosis, posterolateral branch 95% stenosis, with EF estimated 50-55%. Heparin drip was discontinued for elevated anti-Xa, in favor of Integrilin drip.  Currently, patient continues to have chest pain and nitro drip has been restarted. Patient states chest pain is about 4/10 intensity.     Hospital Course:  Patient was taken to the operating room on 11/25/2022 for coronary artery bypass grafting x5 with EVH of the right greater saphenous vein with Dr. Maher.  Patient was sent to ICU in stable condition and was extubated per ICU protocol.  He was discharged on POD #5. His hospital course is outlined below.    CAD s/p CABG x5:  -11/27 chest tubes removed, transfer to telemetry orders were placed  -11/30 pacing wires removed  -ASA, Statin, BB, Plavix    Postop Afib:  -Occurred 11/28 started on amiodarone protocol. He was transitioned to oral amiodarone   -11/29 converted to SR  -Continue amiodarone and metoprolol  -F/u with Dr. Olvera       Discharge Medications:     Discharge Medications      New Medications      Instructions Start Date   clopidogrel 75 MG tablet  Commonly known as: PLAVIX   75 mg, Oral, Daily      HYDROcodone-acetaminophen 7.5-325 MG per tablet  Commonly known as: NORCO   1 tablet, Oral, Every 4 Hours PRN      metoprolol tartrate 25 MG tablet  Commonly known as: LOPRESSOR   25 mg, Oral, Every 12 Hours Scheduled         Changes to Medications      Instructions Start Date   atorvastatin 80 MG tablet  Commonly known as: LIPITOR  What changed:   · medication strength  · how much to take   80 mg, Oral, Nightly         Continue These Medications      Instructions Start Date   aspirin 81 MG EC tablet   81 mg, Oral, Daily         ASK your doctor about these medications      Instructions Start Date   amiodarone 200 MG tablet  Commonly known as: PACERONE  Ask about: Should I take this medication?   200 mg, Oral, Every 8 Hours       amoxicillin-clavulanate 875-125 MG per tablet  Commonly known as: AUGMENTIN  Ask about: Should I take this medication?   1 tablet, Oral, 2 Times Daily             Allergies:  No Known Allergies    Discharge Diet:  Diet Instructions     Diet: Cardiac      Discharge Diet: Cardiac            Activity at Discharge:  Activity Instructions     Bathing Restrictions      Shower daily.  Use a clean wash cloth and antibacterial bar or liquid soap to clean incisions. No tub bath, swimming or hot tub until instructed by MD.    Type of Restriction: Bathing    Bathing Restrictions: No Tub Bath    Driving Restrictions      Type of Restriction: Driving    Driving Restrictions: No Driving Until Next Appointment    Lifting Restrictions      Type of Restriction: Lifting    Lifting Restrictions: Lifting Restriction (Indicate Limit)    Weight Limit (Pounds): 10    Length of Lifting Restriction: until next appointment          Discharge Disposition  Home or Self Care    Discharge Education:  Post-Op Education:  Patient was advised on responsible use of opioids in the post-surgical setting.  Patient was advised these medications are highly addictive.  Patient advised on proper disposal of unused opioid medication and was urged to discard any unused opioid medication. I reviewed the patient's sternal precautions and outlined the physical activity suitable for each benchmark at the 6-week 3-month and 1 year intervals.  Wound Care:  Patient educated regarding care of post-operative wounds.  Patient is to wash with plain soap and water and pat dry.  Patient is not to use salves on the incision sites.  Patient instructed regarding the signs and symptoms of infection and when to call the office with any concerns.    Special Instructions:   1.  Keep incisions clean and dry at all times. Take a shower daily. Do not take a tub bath or use hot tubs until seen by the cardiac surgeon in your follow-up visit. Clean  your body and incisions daily  with Dial soap and water. Always use a clean washcloth. Do not scrub your incision(s). Do not re-use dressings on your incision(s). Do not use any lotions, creams, oils, powders, antibiotic ointment (i.e., Neosporin), peroxide, alcohol, or iodine UNLESS told to do by the cardiac surgeon.  Patient may shower, but no tub baths until CT Surgery followup.   2.  No lifting over 10 lbs until CT Surgery follow up.  3.  No driving until released to do so by the surgeon.      Surgical Leg Precautions:   -Avoid sitting in one position or standing for long periods of time.  -Do not cross your legs.  -Keep your legs elevated while sitting  -Do not use any lotions, creams, oils, powders, antibiotic ointment (i.e. Neosporin), peroxide, alcohol, or iodine unless specifically prescribed by the cardiac surgeon.  -If elastic stockings (LIDA hose) were prescribed to you, wear the stockings while you are up for at least two weeks after discharge. The stockings help decrease swelling. However, remove stockings at bedtime. Wash the stockings with mild soap and water, and make sure they are completely dry before you put them back on.    When to Call the Cardiac Surgeon:  -Increased swelling, redness, tenderness, and drainage  -Increased warmth in the skin around an incision  -Large amount of clear or pinkish drainage  -Sudden increased amount of drainage  -White, yellow, or greenish drainage  -Odor, which may be foul or sweet, coming from an incision  -An opening in your incisions  -Temperature higher than 101 degrees Fahrenheit   -Temperature higher than 100 degrees Fahrenheit two times within 24 hours  -Do not hesitate to call the cardiac surgery nurse navigator or cardiac surgeon if you have concerns or questions.     *The [facility] cardiac surgery nurse navigator is available Monday-Friday 8 a.m. to 4:30 p.m. [phone]  Call 911:  -Chest pain (pain similar to what you experienced prior to surgery)  -Fainting  spells  -Bright red stool  -Vomiting bright red blood  -Shortness of breath not relieved by rest  -Sudden numbness or weakness in arms or legs  -Sudden, severe headache  -Heart rate faster than 150 beats/minute with shortness of breath or a new irregular heart rate      Follow-up Appointments  Future Appointments   Date Time Provider Department Center   12/29/2022  9:30 AM Sachi Muro APRN [facility]   3/2/2023  8:30 AM Vannessa Tuttle DO [facility]   11/13/2023  2:15 PM Vannessa Tuttle DO [facility]     Additional Instructions for the Follow-ups that You Need to Schedule     Cardiac Rehab Evaluation and Enrollment   Dec 05, 2022      Reason for Consult: Education         Ambulatory Referral to Cardiac Rehab   As directed               FRANCISCA Min  12/08/22  12:02 EST    Time: I spent 35 minutes on this discharge activity which included: face-to-face encounter with the patient, reviewing the data in the system, coordination of the care with the nursing staff as well as consultants, documentation, and entering orders.

## 2022-12-29 ENCOUNTER — OFFICE VISIT (OUTPATIENT)
Dept: CARDIAC SURGERY | Facility: CLINIC | Age: 72
End: 2022-12-29

## 2022-12-29 VITALS
BODY MASS INDEX: 24.2 KG/M2 | OXYGEN SATURATION: 98 % | DIASTOLIC BLOOD PRESSURE: 74 MMHG | TEMPERATURE: 98.2 F | WEIGHT: 169 LBS | SYSTOLIC BLOOD PRESSURE: 126 MMHG | HEART RATE: 67 BPM | HEIGHT: 70 IN

## 2022-12-29 DIAGNOSIS — I25.10 CAD IN NATIVE ARTERY: ICD-10-CM

## 2022-12-29 DIAGNOSIS — I21.4 NSTEMI (NON-ST ELEVATED MYOCARDIAL INFARCTION): Primary | ICD-10-CM

## 2022-12-29 PROCEDURE — 99024 POSTOP FOLLOW-UP VISIT: CPT | Performed by: REGISTERED NURSE

## 2022-12-29 PROCEDURE — 71046 X-RAY EXAM CHEST 2 VIEWS: CPT | Performed by: REGISTERED NURSE

## 2022-12-29 NOTE — PROGRESS NOTES
"     [facility] Cardiothoracic Surgery Office Follow Up Note    Date of Encounter: 2022     Name: Cayden Nielsen  : 1950     Referred By: No ref. provider found  PCP: Vannessa Tuttle DO    Chief Complaint:    Chief Complaint   Patient presents with   • Hospital Follow Up Visit     Hsop follow up s/p CABGx5 22- CAD- Pt states that he is not sleeping well since his surgery. \" I have a hard time falling to sleep, I feel like I lay awake most of the night\".  Denies any SOB or fatigue.        Subjective      History of Present Illness:    It was nice to see Cayden Nielsen in follow up after surgery.  He is a 72 y.o. male with PMH significant for HTN and HLD.  Patient presented to the ED with severe chest pain and diaphoresis.  He underwent left heart catheterization which revealead multi-vessel CAD.     Patient is s/p CABG x5 utilizing RSVG by Dr. Maher on 2022.  See op report for full details.  He did well post-operatively.  Patient did experience atrial fibrillation.  He was started on amiodarone protocol and later converted to SR.  On POD #5 patient met required criteria and was deemed appropriate for discharge home.  Patient discharged on asa, statin, beta blocker, Plavix, and amiodarone.  Without re-admission.    Mr. Nielsen presents to office today for initial post-op follow-up.  Patient denies fever, chills, or other constitutional symptoms.  He denies popping, clicking, rubbing sensation of the sternum.  Denies shortness of breath or other respiratory specific symptoms.  Patient reports following up with PCP.  He did not have an appointment scheduled with Cardiology.  He is currently participating in cardiac rehab.  Reports medication compliance.    Review of Systems:  Review of Systems   Constitutional: Negative for chills, decreased appetite, diaphoresis, fever, malaise/fatigue, night sweats, weight gain and weight loss.   HENT: Positive for congestion (allergies from time to time runny " nose). Negative for hoarse voice.    Eyes: Negative for blurred vision, double vision and visual disturbance.   Cardiovascular: Negative for chest pain, claudication, dyspnea on exertion, irregular heartbeat, leg swelling, near-syncope, orthopnea, palpitations, paroxysmal nocturnal dyspnea and syncope.   Respiratory: Negative for cough, hemoptysis, shortness of breath, sputum production and wheezing.    Hematologic/Lymphatic: Positive for bleeding problem. Negative for adenopathy. Bruises/bleeds easily.   Skin: Negative for color change, nail changes, poor wound healing and rash.   Musculoskeletal: Negative for back pain, falls and muscle cramps.   Gastrointestinal: Positive for constipation. Negative for abdominal pain, dysphagia and heartburn.   Genitourinary: Negative for flank pain.   Neurological: Positive for dizziness (when rising from sitting ). Negative for brief paralysis, disturbances in coordination, focal weakness, headaches, light-headedness, loss of balance, numbness, paresthesias, sensory change, vertigo and weakness.   Psychiatric/Behavioral: Negative for depression and suicidal ideas.   Allergic/Immunologic: Negative for persistent infections.       I have reviewed the following portions of the patient's history: allergies, current medications, past family history, past medical history, past social history, past surgical history and problem list and confirm it's accurate.    Allergies:  No Known Allergies    Medications:      Current Outpatient Medications:   •  aspirin 81 MG EC tablet, Take 81 mg by mouth Daily., Disp: , Rfl:   •  atorvastatin (LIPITOR) 80 MG tablet, Take 1 tablet by mouth Every Night., Disp: 90 tablet, Rfl: 4  •  clopidogrel (PLAVIX) 75 MG tablet, Take 1 tablet by mouth Daily., Disp: 30 tablet, Rfl: 4  •  metoprolol tartrate (LOPRESSOR) 25 MG tablet, Take 1 tablet by mouth Every 12 (Twelve) Hours., Disp: 60 tablet, Rfl: 4  •  HYDROcodone-acetaminophen (NORCO) 7.5-325 MG per  "tablet, Take 1 tablet by mouth Every 4 (Four) Hours As Needed for Moderate Pain., Disp: 20 tablet, Rfl: 0  •  traZODone (DESYREL) 100 MG tablet, Take 0.5-1 tablets by mouth Every Night., Disp: 30 tablet, Rfl: 2    History:   Past Medical History:   Diagnosis Date   • Bruises easily    • Hypertension    • Kidney stone    • Kidney stone    • Tuberculosis        Past Surgical History:   Procedure Laterality Date   • CARDIAC CATHETERIZATION N/A 11/23/2022    Procedure: Left Heart Cath;  Surgeon: John Cortez MD;  Location: [facility] CATH INVASIVE LOCATION;  Service: Cardiology;  Laterality: N/A;   • CORONARY ARTERY BYPASS GRAFT N/A 11/25/2022    Procedure: MEDIAN STERNOTOMY CORONARY ARTERY BYPASS GRAFTING X5 UTILIZING THE LEFT INTERNAL MAMMERY GRAFT, EVH OF THE GREATER RIGHT SAPHENOUS VEIN AND GLENDA PER ANESTHESIA;  Surgeon: Jay Maher MD;  Location: [facility] OR;  Service: Cardiothoracic;  Laterality: N/A;       Social History     Socioeconomic History   • Marital status:    Tobacco Use   • Smoking status: Never   • Smokeless tobacco: Never   Vaping Use   • Vaping Use: Never used   Substance and Sexual Activity   • Alcohol use: No   • Drug use: No   • Sexual activity: Defer        Family History   Problem Relation Age of Onset   • Stroke Mother    • Lung cancer Father        Objective     Physical Exam:  Vitals:    12/29/22 0942   BP: 126/74   Pulse: 67   Temp: 98.2 °F (36.8 °C)   SpO2: 98%   Weight: 76.7 kg (169 lb)   Height: 177.8 cm (70\")      Body mass index is 24.25 kg/m².    Physical Exam  Vitals reviewed.   Constitutional:       General: He is not in acute distress.     Appearance: Normal appearance. He is well-groomed. He is not ill-appearing.   HENT:      Nose: No congestion.   Eyes:      Pupils: Pupils are equal, round, and reactive to light.   Cardiovascular:      Rate and Rhythm: Normal rate and regular rhythm.      Heart sounds: Normal heart sounds, S1 normal and S2 normal. No murmur " heard.  Pulmonary:      Effort: Pulmonary effort is normal.      Breath sounds: Normal breath sounds and air entry.   Musculoskeletal:      Right lower leg: No edema.      Left lower leg: No edema.   Skin:     General: Skin is warm and dry.      Capillary Refill: Capillary refill takes less than 2 seconds.      Comments: Mid-sternotomy incision:  wound edges well approximated, no erythema, edema, or induration.  Two areas with un-dissolved suture.  Removed without complication.  Sternum stable on palpation.  Mediastinal tubes sites:  well healing without erythema, edema, or drainage.  Endoscopic vein harvest site:  well healing without erythema, edema, or drainage.   Neurological:      General: No focal deficit present.      Mental Status: He is alert and oriented to person, place, and time.   Psychiatric:         Attention and Perception: Attention normal.         Mood and Affect: Mood normal.         Speech: Speech normal.         Behavior: Behavior normal. Behavior is cooperative.         Thought Content: Thought content normal.         Cognition and Memory: Cognition normal.         Judgment: Judgment normal.         Imaging/Labs:  2 View CXR   Resulted 12/29/2022  IMPRESSION:  1. Status post CABG.  2. No active disease  This report was finalized on 12/29/2022 11:23 AM by Sb Deleon MD.    I personally reviewed this report & films.   Assessment / Plan      Assessment / Plan:  PMH significant for HTN and HLD.  Patient presented to the ED with severe chest pain and diaphoresis.  He underwent left heart catheterization which revealead multi-vessel CAD.     1.  NSTEMI with Multi-vessel Coronary Artery Disease (Primary)  S/p CABG x5 utilizing RSVG by Dr. Maher on 11/25/2022.    Did well post-operatively.    Did experience atrial fibrillation.  Started on amiodarone protocol and later converted to SR.    On POD #5 deemed appropriate for discharge home.  Without re-admission.  Presents to office today for initial  post-op follow-up.    Denies fever, chills, or other constitutional symptoms.  Denies popping, clicking, rubbing sensation of the sternum.  Denies shortness of breath or other respiratory specific symptoms.    Mid-sternotomy incision:  wound edges well approximated, no erythema, edema, or induration.  Two areas with un-dissolved suture.  Removed without complication.  Sternum stable on palpation.  Mediastinal tubes sites:  well healing without erythema, edema, or drainage.  Endoscopic vein harvest site:  well healing without erythema, edema, or drainage.  CXR with post-operative finding.  No acute respiratory findings.   Reports following up with PCP.    Did not have an appointment scheduled with Cardiology.  This has been facilitated.   Currently participating in cardiac rehab.    Reports medication compliance.  Continue asa, statin, beta blocker, Plavix, and amiodarone regimen.     2.  Post-op Atrial Fibrillation  Now in SR.  Continue Amiodarone until seen by Cardiology for follow-up.     Patient Education:  You may resume driving at 6 weeks from your surgery date.  Please plan to stop every 2 hours to ambulate if driving or flying long distances.  No lifting over 10 lbs for 12 weeks (3 months).  After this time you can slowly increase your weight as tolerated.  You should not partake in any overhead activities or movements that involve twisting or jarring of your upper chest and torso such as (but not limited to) -- golfing, tennis, lawn mowing including zero turn mowers, use of lawn trimmers or edgers, shoveling, painting, vacuuming, mopping, sweeping, shooting a gun, etc.  Continue to keep your incisions clean and dry.  Use plain antibacterial soap (i.e. dial) and water to clean and pat dry.    Do no apply any lotions, creams, oils, powders, salves, or ointments directly to incisional sites.  Please watch for signs and symptoms of infection which may include but are not limited to: increased pain or tenderness  around your incision, warmth at the site or fever, redness or red streaking, and foul smelling or appearing drainage.  Clear drainage and bloody drainage are not worrisome.  If your incision opens up please contact our office.    Instructions and post-operative restrictions verbally reviewed -- patient verbalized understanding.    Follow Up:   We will follow on as an as needed basis.    Patient encouraged to call the office with any questions or concerns.     Thank you for allowing me to participate in your care.  Best Regards,    FRANCISCA Kyle  [facility] Cardiothoracic Surgery  12/29/22  12:37 EST

## 2023-02-15 ENCOUNTER — OFFICE VISIT (OUTPATIENT)
Dept: CARDIOLOGY | Facility: CLINIC | Age: 73
End: 2023-02-15
Payer: MEDICARE

## 2023-02-15 VITALS
SYSTOLIC BLOOD PRESSURE: 126 MMHG | DIASTOLIC BLOOD PRESSURE: 70 MMHG | HEART RATE: 60 BPM | BODY MASS INDEX: 25.15 KG/M2 | HEIGHT: 70 IN | OXYGEN SATURATION: 99 % | WEIGHT: 175.7 LBS

## 2023-02-15 DIAGNOSIS — E78.5 HYPERLIPIDEMIA, UNSPECIFIED HYPERLIPIDEMIA TYPE: ICD-10-CM

## 2023-02-15 DIAGNOSIS — I25.10 CORONARY ARTERY DISEASE INVOLVING NATIVE CORONARY ARTERY OF NATIVE HEART WITHOUT ANGINA PECTORIS: Primary | ICD-10-CM

## 2023-02-15 DIAGNOSIS — I48.0 PAROXYSMAL ATRIAL FIBRILLATION: ICD-10-CM

## 2023-02-15 PROCEDURE — 93000 ELECTROCARDIOGRAM COMPLETE: CPT | Performed by: NURSE PRACTITIONER

## 2023-02-15 PROCEDURE — 99214 OFFICE O/P EST MOD 30 MIN: CPT | Performed by: NURSE PRACTITIONER

## 2023-02-15 RX ORDER — AMIODARONE HYDROCHLORIDE 200 MG/1
TABLET ORAL
COMMUNITY
Start: 2023-02-04 | End: 2023-02-15

## 2023-02-15 NOTE — PROGRESS NOTES
Subjective:     Encounter Date:02/15/2023    Primary Care Physician: Vannessa Tuttle DO      Patient ID: Cayden Nielsen is a 72 y.o. male.    Chief Complaint:Follow-up (Carotid artery stenosis)    Problem List:  1. Coronary artery disease  1. 11/23/2022 NSTEMI with multivessel CAD.  2. 11/25/2022 CABG x5 Dr. Nika PILLAI to LAD, VG to marginal, VG to PDA, VG to PLV, VG to PDA/PLV secondary branch.  2. Atrial fibrillation  1. Noted post CABG 2022  3. Carotid artery disease  1. CTA neck 8/3/2021: 70% proximal LICA stenosis no significant RUSTY disease  2. Carotid duplex 10/15/2021: Normal bilateral internal carotid arteries  4. Hypertension  5. History of nephrolithiasis  6. History of positive TB test  7. Surgeries:  1. CABG        No Known Allergies      Current Outpatient Medications:   •  amiodarone (PACERONE) 200 MG tablet, TAKE 1 TABLET BY MOUTH EVERY 12 HOURS FOR 28 DOSES, Disp: , Rfl:   •  aspirin 81 MG EC tablet, Take 81 mg by mouth Daily., Disp: , Rfl:   •  atorvastatin (LIPITOR) 80 MG tablet, Take 1 tablet by mouth Every Night., Disp: 90 tablet, Rfl: 4  •  clopidogrel (PLAVIX) 75 MG tablet, Take 1 tablet by mouth Daily., Disp: 30 tablet, Rfl: 4  •  metoprolol tartrate (LOPRESSOR) 25 MG tablet, Take 1 tablet by mouth Every 12 (Twelve) Hours., Disp: 60 tablet, Rfl: 4  •  traZODone (DESYREL) 100 MG tablet, Take 0.5-1 tablets by mouth Every Night., Disp: 30 tablet, Rfl: 2  •  HYDROcodone-acetaminophen (NORCO) 7.5-325 MG per tablet, Take 1 tablet by mouth Every 4 (Four) Hours As Needed for Moderate Pain., Disp: 20 tablet, Rfl: 0        History of Present Illness    Patient is a 72-year-old  male who is being seen today for hospital follow-up status post admission with NSTEMI, subsequent multivessel disease and CABG.  Since being discharged patient notes to be doing relatively well.  He is attending cardiac rehab.  Notes that he has been released by CT surgery.  He has some intermittent pleuritic  "discomfort.  Denies any increasing shortness of breath.  Notes he is able to gradually increase his activity at rehab.  No reported syncope.  Has some positional dizziness which he thinks is related to his amiodarone.  Has not had any loss of consciousness.  No overt hypotension at rehab.  Patient has questions about when he can return to work.    The following portions of the patient's history were reviewed and updated as appropriate: allergies, current medications, past family history, past medical history, past social history, past surgical history and problem list.      Social History     Tobacco Use   • Smoking status: Never   • Smokeless tobacco: Never   Vaping Use   • Vaping Use: Never used   Substance Use Topics   • Alcohol use: No   • Drug use: No         Review of Systems   Constitutional: Positive for malaise/fatigue.   Cardiovascular: Positive for chest pain. Negative for dyspnea on exertion, leg swelling, palpitations and syncope.   Respiratory: Negative.  Negative for shortness of breath.    Hematologic/Lymphatic: Negative for bleeding problem. Does not bruise/bleed easily.   Skin: Negative for rash.   Musculoskeletal: Positive for arthritis. Negative for muscle weakness and myalgias.   Gastrointestinal: Negative for heartburn, nausea and vomiting.   Neurological: Negative for dizziness, light-headedness, loss of balance and numbness.          Objective:   /70 (BP Location: Left arm, Patient Position: Sitting)   Pulse 60   Ht 177.8 cm (70\")   Wt 79.7 kg (175 lb 11.2 oz)   SpO2 99%   BMI 25.21 kg/m²         Vitals reviewed.   Constitutional:       Appearance: Well-developed and not in distress.   Neck:      Vascular: No JVD.      Trachea: No tracheal deviation.   Pulmonary:      Effort: Pulmonary effort is normal.      Breath sounds: Normal breath sounds.   Cardiovascular:      Normal rate. Regular rhythm.   Edema:     Peripheral edema absent.   Abdominal:      Palpations: Abdomen is soft.    " "  Tenderness: There is no abdominal tenderness.   Musculoskeletal:         General: No deformity. Skin:     General: Skin is warm and dry.   Neurological:      Mental Status: Alert and oriented to person, place, and time.           ECG 12 Lead    Date/Time: 2/15/2023 2:45 PM  Performed by: Judith Forrester APRN  Authorized by: Judith Forrester APRN   Comparison: compared with previous ECG from 11/30/2022  Comparison to previous ECG: Bradycardia now noted, no PVCs or PACs.  Rhythm: sinus bradycardia  Conduction: 1st degree AV block  Other findings: non-specific ST-T wave changes                  Assessment:   Assessment & Plan      Diagnoses and all orders for this visit:    1. Coronary artery disease involving native coronary artery of native heart without angina pectoris (Primary), stable.  Status post CABG 11/25/2022    2. Hyperlipidemia, unspecified hyperlipidemia type, stable.  On high intensity statin.    3.  Paroxysmal atrial fibrillation.  No noted recurrence.  Currently on amiodarone.      Plan:  1. Discontinue amiodarone  2. Continue other current cardiac medications  3. We will plan for mobile telemetry monitor in 3 months once amiodarone has \"washed out\".  4. We will reach out to CT surgery's office regarding when patient can return to work.  5. Follow-up in office in 6 months time or sooner if symptoms change or abnormal testing.       Judith ESPINOSA             Dictated utilizing Dragon dictation  "

## 2023-02-20 ENCOUNTER — TELEPHONE (OUTPATIENT)
Dept: CARDIAC SURGERY | Facility: CLINIC | Age: 73
End: 2023-02-20

## 2023-02-20 ENCOUNTER — TELEPHONE (OUTPATIENT)
Dept: CARDIOLOGY | Facility: CLINIC | Age: 73
End: 2023-02-20

## 2023-02-20 NOTE — TELEPHONE ENCOUNTER
Caller: JAGDEEP VERGARA    Relationship: Emergency Contact    Best call back number: [phone]     What form or medical record are you requesting: OFFICE NOTES FROM DR. WEST PROCEDURE IN November, AND DR. CORBIN     Who is requesting this form or medical record from you: [email or web address]    How would you like to receive the form or medical records (pick-up, mail, fax):   If fax, what is the fax number:                                                [phone]  If mail, what is the address: N/A  If pick-up, provide patient with address and location details  [email or web address]   CLAIM NUMBER# [id number]    Timeframe paperwork needed:ASAP

## 2023-02-20 NOTE — TELEPHONE ENCOUNTER
Caller: JAGDEEP VERGARA    Relationship: Emergency Contact    Best call back number: [phone]     What form or medical record are you requesting: OFFICE NOTES FROM DR. WEST PROCEDURE IN November, AND DR. CORBIN     Who is requesting this form or medical record from you: [employer]    How would you like to receive the form or medical records (pick-up, mail, fax):   If fax, what is the fax number:                                                [phone]  If mail, what is the address: N/A  If pick-up, provide patient with address and location details  [employer]   CLAIM NUMBER# [id number]    Timeframe paperwork needed:ASAP    Additional notes:     PATIENT SPOUSE CALLED STATING THAT PATIENT NEEDS OFFICE NOTE FROM 11.25.22 AND 12.29.22 FOR DISABILITY CALIM.

## 2023-03-02 ENCOUNTER — OFFICE VISIT (OUTPATIENT)
Dept: FAMILY MEDICINE CLINIC | Facility: CLINIC | Age: 73
End: 2023-03-02
Payer: MEDICARE

## 2023-03-02 VITALS
RESPIRATION RATE: 12 BRPM | TEMPERATURE: 97.1 F | SYSTOLIC BLOOD PRESSURE: 128 MMHG | HEART RATE: 58 BPM | WEIGHT: 175 LBS | OXYGEN SATURATION: 96 % | HEIGHT: 70 IN | DIASTOLIC BLOOD PRESSURE: 72 MMHG | BODY MASS INDEX: 25.05 KG/M2

## 2023-03-02 DIAGNOSIS — E78.00 HIGH CHOLESTEROL: Primary | ICD-10-CM

## 2023-03-02 PROCEDURE — 99213 OFFICE O/P EST LOW 20 MIN: CPT | Performed by: FAMILY MEDICINE

## 2023-03-02 RX ORDER — ATORVASTATIN CALCIUM 40 MG/1
80 TABLET, FILM COATED ORAL NIGHTLY
Qty: 180 TABLET | Refills: 3 | Status: SHIPPED | OUTPATIENT
Start: 2023-03-02

## 2023-03-02 RX ORDER — ATORVASTATIN CALCIUM 40 MG/1
80 TABLET, FILM COATED ORAL NIGHTLY
COMMUNITY
Start: 2023-02-09 | End: 2023-03-02 | Stop reason: SDUPTHER

## 2023-03-02 NOTE — PROGRESS NOTES
Chief Complaint  3 mo f/u    Subjective          Cayden Nielsen presents to [facility] for a follow up.  History of Present Illness    High cholesterol   Lipid panel well controlled  Taking atorvastatin 80mg, tolerating without side effect.     Coronary artery bypass graft   The patient reports he's been released back to work after his coronary artery bypass graft. He states he was experiencing fatigue when he was at work. He notes he is participating in cardiac rehab therapy. The patient reports he has discontinued amiodarone. After 3 months, he will complete a holter monitor. He reports he is still taking Plavix, and Aspirin.      Sleep   The patient states he discontinued the trazodone, and is currently utilizing melatonin with benefit.        Health Maintenace   The patient reports he has obtained 2 COVID-19 vaccines. He notes he has not obtained the Shingrix vaccine.   He notes he has not obtained the pneumonia vaccination, does not want to complete.     The following portions of the patient's history were reviewed and updated as appropriate: allergies, current medications, past family history, past medical history, past social history, past surgical history and problem list.    Objective      Physical Exam  Vitals reviewed.   Constitutional:       Appearance: He is well-developed.   Cardiovascular:      Rate and Rhythm: Normal rate and regular rhythm.      Heart sounds: Normal heart sounds.   Pulmonary:      Effort: Pulmonary effort is normal.      Breath sounds: Normal breath sounds.   Musculoskeletal:         General: No swelling.   Neurological:      Mental Status: He is alert.   Psychiatric:         Behavior: Behavior normal.        Result Review :                Assessment and Plan    Diagnoses and all orders for this visit:    1. High cholesterol (Primary)  -     atorvastatin (LIPITOR) 40 MG tablet; Take 2 tablets by mouth Every Night.  Dispense: 180 tablet; Refill: 3  -      CBC & Differential; Future  -     Comprehensive Metabolic Panel; Future  -     Lipid Panel With / Chol / HDL Ratio; Future    ASSESSMENT & PLAN:     1.High cholesterol   -CBC & Differential    -Comprehensive Metabolic Panel    -Lipid Panel With / Chol / HDL Ratio      Medication refilled for atorvastatin 80mg for the patient today.    He will continue to follow up with the cardiologist.    Sleep is stable with melatonin; he discontinued the trazodone.   He declined the pneumonia vaccination today      Follow Up   Return in about 6 months (around 9/2/2023) for Recheck.  Patient was given instructions and counseling regarding his condition or for health maintenance advice. Please see specific information pulled into the AVS if appropriate.    Transcribed from ambient dictation for Vannessa Tuttle DO by Martina Chowdary.  03/02/23   11:15 EST    Patient or patient representative verbalized consent to the visit recording.  I have personally performed the services described in this document as transcribed by the above individual, and it is both accurate and complete.  Vannessa Tuttle DO  3/2/2023  21:17 EST

## 2023-03-06 DIAGNOSIS — E78.00 HIGH CHOLESTEROL: ICD-10-CM

## 2023-03-07 DIAGNOSIS — N28.9 DECREASED RENAL FUNCTION: Primary | ICD-10-CM

## 2023-03-07 LAB
ALBUMIN SERPL-MCNC: 3.8 G/DL (ref 3.5–5.2)
ALBUMIN/GLOB SERPL: 1.4 G/DL
ALP SERPL-CCNC: 118 U/L (ref 39–117)
ALT SERPL-CCNC: 24 U/L (ref 1–41)
AST SERPL-CCNC: 27 U/L (ref 1–40)
BASOPHILS # BLD AUTO: 0.03 10*3/MM3 (ref 0–0.2)
BASOPHILS NFR BLD AUTO: 0.6 % (ref 0–1.5)
BILIRUB SERPL-MCNC: 0.3 MG/DL (ref 0–1.2)
BUN SERPL-MCNC: 20 MG/DL (ref 8–23)
BUN/CREAT SERPL: 15.3 (ref 7–25)
CALCIUM SERPL-MCNC: 8.9 MG/DL (ref 8.6–10.5)
CHLORIDE SERPL-SCNC: 104 MMOL/L (ref 98–107)
CHOLEST SERPL-MCNC: 108 MG/DL (ref 0–200)
CHOLEST/HDLC SERPL: 2.57 {RATIO}
CO2 SERPL-SCNC: 27.8 MMOL/L (ref 22–29)
CREAT SERPL-MCNC: 1.31 MG/DL (ref 0.76–1.27)
EGFRCR SERPLBLD CKD-EPI 2021: 57.8 ML/MIN/1.73
EOSINOPHIL # BLD AUTO: 0.24 10*3/MM3 (ref 0–0.4)
EOSINOPHIL NFR BLD AUTO: 4.4 % (ref 0.3–6.2)
ERYTHROCYTE [DISTWIDTH] IN BLOOD BY AUTOMATED COUNT: 15.4 % (ref 12.3–15.4)
GLOBULIN SER CALC-MCNC: 2.7 GM/DL
GLUCOSE SERPL-MCNC: 107 MG/DL (ref 65–99)
HCT VFR BLD AUTO: 39.5 % (ref 37.5–51)
HDLC SERPL-MCNC: 42 MG/DL (ref 40–60)
HGB BLD-MCNC: 12.7 G/DL (ref 13–17.7)
IMM GRANULOCYTES # BLD AUTO: 0.02 10*3/MM3 (ref 0–0.05)
IMM GRANULOCYTES NFR BLD AUTO: 0.4 % (ref 0–0.5)
LDLC SERPL CALC-MCNC: 55 MG/DL (ref 0–100)
LYMPHOCYTES # BLD AUTO: 0.91 10*3/MM3 (ref 0.7–3.1)
LYMPHOCYTES NFR BLD AUTO: 16.8 % (ref 19.6–45.3)
MCH RBC QN AUTO: 28.4 PG (ref 26.6–33)
MCHC RBC AUTO-ENTMCNC: 32.2 G/DL (ref 31.5–35.7)
MCV RBC AUTO: 88.4 FL (ref 79–97)
MONOCYTES # BLD AUTO: 0.77 10*3/MM3 (ref 0.1–0.9)
MONOCYTES NFR BLD AUTO: 14.2 % (ref 5–12)
NEUTROPHILS # BLD AUTO: 3.44 10*3/MM3 (ref 1.7–7)
NEUTROPHILS NFR BLD AUTO: 63.6 % (ref 42.7–76)
NRBC BLD AUTO-RTO: 0 /100 WBC (ref 0–0.2)
PLATELET # BLD AUTO: 187 10*3/MM3 (ref 140–450)
POTASSIUM SERPL-SCNC: 4.5 MMOL/L (ref 3.5–5.2)
PROT SERPL-MCNC: 6.5 G/DL (ref 6–8.5)
RBC # BLD AUTO: 4.47 10*6/MM3 (ref 4.14–5.8)
SODIUM SERPL-SCNC: 141 MMOL/L (ref 136–145)
TRIGL SERPL-MCNC: 46 MG/DL (ref 0–150)
VLDLC SERPL CALC-MCNC: 11 MG/DL (ref 5–40)
WBC # BLD AUTO: 5.41 10*3/MM3 (ref 3.4–10.8)

## 2023-03-08 LAB
HBA1C MFR BLD: 5.6 % (ref 4.8–5.6)
Lab: NORMAL
WRITTEN AUTHORIZATION: NORMAL

## 2023-04-24 DIAGNOSIS — E78.00 HIGH CHOLESTEROL: ICD-10-CM

## 2023-04-24 RX ORDER — ATORVASTATIN CALCIUM 40 MG/1
80 TABLET, FILM COATED ORAL NIGHTLY
Qty: 180 TABLET | Refills: 3 | Status: SHIPPED | OUTPATIENT
Start: 2023-04-24

## 2023-04-24 RX ORDER — CLOPIDOGREL BISULFATE 75 MG/1
75 TABLET ORAL DAILY
Qty: 30 TABLET | Refills: 4 | OUTPATIENT
Start: 2023-04-24

## 2023-04-24 NOTE — TELEPHONE ENCOUNTER
Pt stop by office requesting refill on Plavix and Lipitor sent to [facility]    [phone]

## 2023-06-01 DIAGNOSIS — I48.91 ATRIAL FIBRILLATION, UNSPECIFIED TYPE: Primary | ICD-10-CM

## 2023-06-01 DIAGNOSIS — I48.91 ATRIAL FIBRILLATION, UNSPECIFIED TYPE: ICD-10-CM

## 2023-06-01 DIAGNOSIS — I48.0 PAROXYSMAL ATRIAL FIBRILLATION: ICD-10-CM

## 2023-06-01 DIAGNOSIS — I21.4 NSTEMI (NON-ST ELEVATED MYOCARDIAL INFARCTION): Primary | ICD-10-CM

## 2023-08-10 NOTE — PROGRESS NOTES
Subjective:     Encounter Date:08/16/2023    Primary Care Physician: Vannessa Tuttle DO      Patient ID: Cayden Nielsen is a 73 y.o. male.    Chief Complaint: Coronary artery disease, hypertension, postop atrial fibs  PROBLEM LIST:  Coronary artery disease  11/23/2022 NSTEMI with multivessel CAD.  11/25/2022 CABG x5 Dr. Nika PILLAI to LAD, VG to marginal, VG to PDA, VG to PLV, VG to PDA/PLV secondary branch.  Atrial fibrillation  Noted post CABG 2022  28 day Cardiac event monitor 6/23 revealing no episodes of atrial fibrillation, Josh Olvera MD  Carotid artery disease  CTA neck 8/3/2021: 70% proximal LICA stenosis no significant RUSTY disease  Carotid duplex 10/15/2021: Normal bilateral internal carotid arteries  Hypertension  History of nephrolithiasis  History of positive TB test  Surgeries:  CABG       Past Medical History:   Diagnosis Date    Bruises easily     Hypertension     Kidney stone     Kidney stone     Tuberculosis      Past Surgical History:   Procedure Laterality Date    CARDIAC CATHETERIZATION N/A 11/23/2022    Procedure: Left Heart Cath;  Surgeon: John Cortez MD;  Location:  [facility];  Service: Cardiology;  Laterality: N/A;    CORONARY ARTERY BYPASS GRAFT N/A 11/25/2022    Procedure: MEDIAN STERNOTOMY CORONARY ARTERY BYPASS GRAFTING X5 UTILIZING THE LEFT INTERNAL MAMMERY GRAFT, EVH OF THE GREATER RIGHT SAPHENOUS VEIN AND GLENDA PER ANESTHESIA;  Surgeon: Jay Maher MD;  Location: [facility] OR;  Service: Cardiothoracic;  Laterality: N/A;       No Known Allergies      Current Outpatient Medications:     aspirin 81 MG EC tablet, Take 1 tablet by mouth Daily., Disp: , Rfl:     atorvastatin (LIPITOR) 40 MG tablet, Take 2 tablets by mouth Every Night., Disp: 180 tablet, Rfl: 3    clopidogrel (PLAVIX) 75 MG tablet, Take 1 tablet by mouth Daily., Disp: 30 tablet, Rfl: 4    metoprolol tartrate (LOPRESSOR) 25 MG tablet, Take 1 tablet by mouth Every 12 (Twelve) Hours., Disp: 180 tablet,  "Rfl: 4        History of Present Illness    Mr. Nielsen is a 73-year-old white male with the above-noted medical history who presents for 6-month follow-up of his coronary artery disease, postop atrial fibrillation and hypertension.  Since his last visit he underwent a 28-day cardiac event monitor which revealed no atrial fibrillation or other arrhythmias.  He states that he is doing well from a cardiac standpoint. He is working 2 days a week at [employer] and is fishing on a regular basis.  He denies symptoms of chest pain,unusual shortness of breath, palpitations, edema or heart failure.  His blood pressure has been well controlled.  He is compliant with his medications and denies side effects.  His lipid panel from March was acceptable.    The following portions of the patient's history were reviewed and updated as appropriate: allergies, current medications, past family history, past medical history, past social history, past surgical history and problem list.    Family History   Problem Relation Age of Onset    Stroke Mother     Lung cancer Father        Social History     Tobacco Use    Smoking status: Never    Smokeless tobacco: Never   Vaping Use    Vaping Use: Never used   Substance Use Topics    Alcohol use: No    Drug use: No         Review of Systems   Constitutional: Negative.   HENT: Negative.     Eyes: Negative.    Cardiovascular:  Negative for chest pain, dyspnea on exertion, irregular heartbeat, leg swelling, near-syncope, orthopnea, palpitations, paroxysmal nocturnal dyspnea and syncope.   Respiratory: Negative.     Endocrine: Negative.    Hematologic/Lymphatic: Negative.    Skin: Negative.    Musculoskeletal: Negative.    Gastrointestinal: Negative.    Genitourinary: Negative.    Neurological: Negative.    Psychiatric/Behavioral: Negative.          Objective:   /74   Pulse 62   Ht 177.8 cm (70\")   Wt 79.2 kg (174 lb 8 oz)   SpO2 98%   BMI 25.04 kg/mý         Constitutional:       Appearance: " Healthy appearance. Not in distress.   Pulmonary:      Effort: Pulmonary effort is normal.      Breath sounds: Normal breath sounds.   Cardiovascular:      PMI at left midclavicular line. Normal rate. Regular rhythm. Normal S1. Normal S2.       Murmurs: There is no murmur.      No gallop.  No click. No rub.   Pulses:     Intact distal pulses.   Edema:     Peripheral edema absent.   Abdominal:      General: Bowel sounds are normal.      Palpations: Abdomen is soft.   Musculoskeletal: Normal range of motion.      Cervical back: Normal range of motion. Skin:     General: Skin is warm and dry.   Neurological:      General: No focal deficit present.      Mental Status: Alert and oriented to person, place and time.       Procedures  FLP 3/23 TC- 108, Trig-46, HDL-42, LDL-55        Assessment:   Assessment & Plan      Diagnoses and all orders for this visit:    1. NSTEMI s/p CABG 11/25/2022 (Primary)    2. Hyperlipidemia LDL goal <70    3. Essential hypertension    4. Paroxysmal atrial fibrillation    Plan:  Continue ASA for CAD, may discontinue Plavix.  Continue Atorvastatin for hyperlipidemia.  Continue Lopressor for hypertension  No recurrent Atrial Fib noted on monitor 6/23.  Follow-up in one year, sooner if needed.    Scribed for Josh Olvera MD by Sabine Hernandes RN. 8/16/2023  14:31 EDT        I have seen and examined the patient, I have reviewed the note, discussed the case with the advance practice clinician, made necessary changes and I agree with the final note.    Josh Olvera MD  08/16/23  14:31 EDT               Advance Care Planning   ACP discussion was declined by the patient. Patient does not have an advance directive, declines further assistance.        Dictated utilizing Dragon dictation

## 2023-08-16 ENCOUNTER — OFFICE VISIT (OUTPATIENT)
Dept: CARDIOLOGY | Facility: CLINIC | Age: 73
End: 2023-08-16
Payer: MEDICARE

## 2023-08-16 VITALS
WEIGHT: 174.5 LBS | SYSTOLIC BLOOD PRESSURE: 126 MMHG | OXYGEN SATURATION: 98 % | BODY MASS INDEX: 24.98 KG/M2 | HEIGHT: 70 IN | DIASTOLIC BLOOD PRESSURE: 74 MMHG | HEART RATE: 62 BPM

## 2023-08-16 DIAGNOSIS — I10 ESSENTIAL HYPERTENSION: ICD-10-CM

## 2023-08-16 DIAGNOSIS — I21.4 NSTEMI (NON-ST ELEVATED MYOCARDIAL INFARCTION): Primary | ICD-10-CM

## 2023-08-16 DIAGNOSIS — E78.5 HYPERLIPIDEMIA LDL GOAL <70: ICD-10-CM

## 2023-08-16 DIAGNOSIS — I48.0 PAROXYSMAL ATRIAL FIBRILLATION: ICD-10-CM

## 2023-08-16 PROCEDURE — 3078F DIAST BP <80 MM HG: CPT | Performed by: INTERNAL MEDICINE

## 2023-08-16 PROCEDURE — 99214 OFFICE O/P EST MOD 30 MIN: CPT | Performed by: INTERNAL MEDICINE

## 2023-08-16 PROCEDURE — 3074F SYST BP LT 130 MM HG: CPT | Performed by: INTERNAL MEDICINE

## 2023-08-16 PROCEDURE — 1160F RVW MEDS BY RX/DR IN RCRD: CPT | Performed by: INTERNAL MEDICINE

## 2023-08-16 PROCEDURE — 1159F MED LIST DOCD IN RCRD: CPT | Performed by: INTERNAL MEDICINE

## 2023-09-06 ENCOUNTER — OFFICE VISIT (OUTPATIENT)
Dept: FAMILY MEDICINE CLINIC | Facility: CLINIC | Age: 73
End: 2023-09-06
Payer: MEDICARE

## 2023-09-06 VITALS
HEART RATE: 64 BPM | RESPIRATION RATE: 16 BRPM | SYSTOLIC BLOOD PRESSURE: 126 MMHG | OXYGEN SATURATION: 97 % | HEIGHT: 70 IN | DIASTOLIC BLOOD PRESSURE: 60 MMHG | WEIGHT: 176 LBS | TEMPERATURE: 97.3 F | BODY MASS INDEX: 25.2 KG/M2

## 2023-09-06 DIAGNOSIS — J06.9 VIRAL URI: ICD-10-CM

## 2023-09-06 DIAGNOSIS — E78.5 HYPERLIPIDEMIA LDL GOAL <70: ICD-10-CM

## 2023-09-06 DIAGNOSIS — R05.9 COUGH IN ADULT: ICD-10-CM

## 2023-09-06 DIAGNOSIS — Z12.5 PROSTATE CANCER SCREENING: ICD-10-CM

## 2023-09-06 DIAGNOSIS — I10 ESSENTIAL HYPERTENSION: Primary | ICD-10-CM

## 2023-09-06 LAB
EXPIRATION DATE: NORMAL
FLUAV AG UPPER RESP QL IA.RAPID: NOT DETECTED
FLUBV AG UPPER RESP QL IA.RAPID: NOT DETECTED
INTERNAL CONTROL: NORMAL
Lab: NORMAL
SARS-COV-2 AG UPPER RESP QL IA.RAPID: NOT DETECTED

## 2023-09-06 PROCEDURE — 99214 OFFICE O/P EST MOD 30 MIN: CPT | Performed by: FAMILY MEDICINE

## 2023-09-06 PROCEDURE — 87428 SARSCOV & INF VIR A&B AG IA: CPT | Performed by: FAMILY MEDICINE

## 2023-09-06 PROCEDURE — 3074F SYST BP LT 130 MM HG: CPT | Performed by: FAMILY MEDICINE

## 2023-09-06 PROCEDURE — 3078F DIAST BP <80 MM HG: CPT | Performed by: FAMILY MEDICINE

## 2023-09-06 RX ORDER — METHYLPREDNISOLONE 4 MG/1
TABLET ORAL
Qty: 21 TABLET | Refills: 0 | Status: SHIPPED | OUTPATIENT
Start: 2023-09-06

## 2023-09-06 RX ORDER — DEXTROMETHORPHAN HYDROBROMIDE AND PROMETHAZINE HYDROCHLORIDE 15; 6.25 MG/5ML; MG/5ML
5 SYRUP ORAL 4 TIMES DAILY PRN
Qty: 180 ML | Refills: 0 | Status: SHIPPED | OUTPATIENT
Start: 2023-09-06

## 2023-09-06 NOTE — PROGRESS NOTES
Chief Complaint  6 mo f/u (Pt is not fasting. Chest congestion with slight cough, x1wk)    Subjective          Cayden Nielsen presents to [facility]  History of Present Illness    Cayden Nielsen presents today for follow-up.     The patient notes that he has been experiencing cough and congestion for over 1 week. The patient states that his symptoms have been improving. He is not taking any over the counter medication, other then cough drops at night. He will also take Robitussin as needed.     The patient states that he has completed his Plavix treatment, but still taking ASA. He has been following up with cardiology. His blood pressure has been well maintained. He is currently taking the atorvastatin. He denies any other current health issues.     The following portions of the patient's history were reviewed and updated as appropriate: allergies, current medications, past family history, past medical history, past social history, past surgical history, and problem list.    Objective      Physical Exam  Vitals reviewed.   Cardiovascular:      Rate and Rhythm: Normal rate.      Heart sounds: Normal heart sounds.   Pulmonary:      Effort: Pulmonary effort is normal.      Breath sounds: Normal breath sounds. No wheezing or rhonchi.   Musculoskeletal:         General: No swelling.   Neurological:      Mental Status: He is alert.      Result Review :                Assessment and Plan          1. Viral URI   - methylprednisolone (MEDROL) 4 MG dose pack.   - promethazine-dextromethorphan (PROMETHAZINE-DM) 6.25-15 MG/5ML syrup.     2. Cough in adult   - POCT SARS-CoV-2 Antigen MARLEEN + Flu, negative result     3. Hyperlipidemia LDL goal  - Comprehensive metabolic panel.    4. Prostate cancer screening   -   PSA screening.     Transcribed from ambient dictation for Vannessa Tuttle DO by Bernarda Munoz.  09/06/23   12:58 EDT    Patient or patient representative verbalized consent to the visit  recording.  I have personally performed the services described in this document as transcribed by the above individual, and it is both accurate and complete.      Follow Up   Return in about 6 months (around 3/6/2024) for Medicare Wellness.  Patient was given instructions and counseling regarding his condition or for health maintenance advice. Please see specific information pulled into the AVS if appropriate.

## 2023-09-07 LAB
ALBUMIN SERPL-MCNC: 4.2 G/DL (ref 3.5–5.2)
ALBUMIN/GLOB SERPL: 1.6 G/DL
ALP SERPL-CCNC: 116 U/L (ref 39–117)
ALT SERPL-CCNC: 19 U/L (ref 1–41)
AST SERPL-CCNC: 25 U/L (ref 1–40)
BILIRUB SERPL-MCNC: 0.8 MG/DL (ref 0–1.2)
BUN SERPL-MCNC: 24 MG/DL (ref 8–23)
BUN/CREAT SERPL: 20.7 (ref 7–25)
CALCIUM SERPL-MCNC: 9.4 MG/DL (ref 8.6–10.5)
CHLORIDE SERPL-SCNC: 103 MMOL/L (ref 98–107)
CO2 SERPL-SCNC: 25.9 MMOL/L (ref 22–29)
CREAT SERPL-MCNC: 1.16 MG/DL (ref 0.76–1.27)
EGFRCR SERPLBLD CKD-EPI 2021: 66.5 ML/MIN/1.73
GLOBULIN SER CALC-MCNC: 2.7 GM/DL
GLUCOSE SERPL-MCNC: 128 MG/DL (ref 65–99)
POTASSIUM SERPL-SCNC: 4.5 MMOL/L (ref 3.5–5.2)
PROT SERPL-MCNC: 6.9 G/DL (ref 6–8.5)
PSA SERPL-MCNC: 30.5 NG/ML (ref 0–4)
SODIUM SERPL-SCNC: 139 MMOL/L (ref 136–145)

## 2023-09-08 ENCOUNTER — TELEPHONE (OUTPATIENT)
Dept: FAMILY MEDICINE CLINIC | Facility: CLINIC | Age: 73
End: 2023-09-08
Payer: MEDICARE

## 2023-09-08 NOTE — TELEPHONE ENCOUNTER
Hub staff attempted to follow warm transfer process and was unsuccessful     Caller: Cayden Nielsen    Relationship to patient: Self    Best call back number: [phone]     Patient is needing: CALLING FOR EDIE

## 2023-09-12 DIAGNOSIS — R97.20 ELEVATED PSA: Primary | ICD-10-CM

## 2024-04-29 ENCOUNTER — OFFICE VISIT (OUTPATIENT)
Dept: FAMILY MEDICINE CLINIC | Facility: CLINIC | Age: 74
End: 2024-04-29
Payer: MEDICARE

## 2024-04-29 VITALS
HEART RATE: 66 BPM | WEIGHT: 187.8 LBS | BODY MASS INDEX: 26.88 KG/M2 | TEMPERATURE: 97.3 F | SYSTOLIC BLOOD PRESSURE: 140 MMHG | DIASTOLIC BLOOD PRESSURE: 78 MMHG | RESPIRATION RATE: 14 BRPM | HEIGHT: 70 IN | OXYGEN SATURATION: 97 %

## 2024-04-29 DIAGNOSIS — I10 ESSENTIAL HYPERTENSION: ICD-10-CM

## 2024-04-29 DIAGNOSIS — Z00.00 MEDICARE ANNUAL WELLNESS VISIT, INITIAL: Primary | ICD-10-CM

## 2024-04-29 DIAGNOSIS — E78.00 HIGH CHOLESTEROL: ICD-10-CM

## 2024-04-29 DIAGNOSIS — C61 PROSTATE CANCER: ICD-10-CM

## 2024-04-29 DIAGNOSIS — E78.5 HYPERLIPIDEMIA LDL GOAL <70: ICD-10-CM

## 2024-04-29 DIAGNOSIS — E66.3 OVERWEIGHT (BMI 25.0-29.9): ICD-10-CM

## 2024-04-29 RX ORDER — BICALUTAMIDE 50 MG/1
1 TABLET, FILM COATED ORAL DAILY
COMMUNITY
Start: 2024-04-21

## 2024-04-29 RX ORDER — ATORVASTATIN CALCIUM 40 MG/1
80 TABLET, FILM COATED ORAL NIGHTLY
Qty: 180 TABLET | Refills: 0 | Status: SHIPPED | OUTPATIENT
Start: 2024-04-29

## 2024-04-29 NOTE — PROGRESS NOTES
The ABCs of the Annual Wellness Visit  Subsequent Medicare Wellness Visit    Subjective    Cayden Nielsen is a 73 y.o. male who presents for a Subsequent Medicare Wellness Visit.    The following portions of the patient's history were reviewed and   updated as appropriate: allergies, current medications, past family history, past medical history, past social history, past surgical history, and problem list.    Compared to one year ago, the patient feels his physical   health is the same.    Compared to one year ago, the patient feels his mental   health is the same.    Recent Hospitalizations:  He was not admitted to the hospital during the last year.       Current Medical Providers:  Patient Care Team:  Vannessa Tuttle DO as PCP - General (Family Medicine)  John Cortez MD as Consulting Physician (Cardiology)    Outpatient Medications Prior to Visit   Medication Sig Dispense Refill    aspirin 81 MG EC tablet Take 1 tablet by mouth Daily.      bicalutamide (Casodex) 50 MG tablet Take 1 tablet by mouth Daily.      metoprolol tartrate (LOPRESSOR) 25 MG tablet Take 1 tablet by mouth Every 12 (Twelve) Hours. 180 tablet 4    atorvastatin (LIPITOR) 40 MG tablet Take 2 tablets by mouth Every Night. 180 tablet 3    methylPREDNISolone (MEDROL) 4 MG dose pack Take as directed on package instructions. 21 tablet 0    promethazine-dextromethorphan (PROMETHAZINE-DM) 6.25-15 MG/5ML syrup Take 5 mL by mouth 4 (Four) Times a Day As Needed for Cough. 180 mL 0     No facility-administered medications prior to visit.       No opioid medication identified on active medication list. I have reviewed chart for other potential  high risk medication/s and harmful drug interactions in the elderly.        Aspirin is on active medication list. Aspirin use is indicated based on review of current medical condition/s. Pros and cons of this therapy have been discussed today. Benefits of this medication outweigh potential harm.  Patient has been  "encouraged to continue taking this medication.  .      Patient Active Problem List   Diagnosis    Hyperlipidemia LDL goal <70    NSTEMI s/p CABG 2022    Essential hypertension    Prostate cancer     Advance Care Planning   Advance Care Planning     Advance Directive is not on file.  ACP discussion was held with the patient during this visit. Patient does not have an advance directive, information provided.     Objective    Vitals:    24 1635   BP: 140/78   Pulse: 66   Resp: 14   Temp: 97.3 °F (36.3 °C)   SpO2: 97%   Weight: 85.2 kg (187 lb 12.8 oz)   Height: 177.8 cm (70\")     Estimated body mass index is 26.95 kg/m² as calculated from the following:    Height as of this encounter: 177.8 cm (70\").    Weight as of this encounter: 85.2 kg (187 lb 12.8 oz).    BMI is >= 25 and <30. (Overweight) The following options were offered after discussion;: information on healthy weight added to patient's after visit summary       Does the patient have evidence of cognitive impairment? No          HEALTH RISK ASSESSMENT    Smoking Status:  Social History     Tobacco Use   Smoking Status Never   Smokeless Tobacco Never     Alcohol Consumption:  Social History     Substance and Sexual Activity   Alcohol Use No     Fall Risk Screen:    GREGORADI Fall Risk Assessment was completed, and patient is at LOW risk for falls.Assessment completed on:2024    Depression Screenin/29/2024     4:35 PM   PHQ-2/PHQ-9 Depression Screening   Little Interest or Pleasure in Doing Things 0-->not at all   Feeling Down, Depressed or Hopeless 0-->not at all   PHQ-9: Brief Depression Severity Measure Score 0       Health Habits and Functional and Cognitive Screenin/29/2024     4:39 PM   Functional & Cognitive Status   Do you have difficulty preparing food and eating? No   Do you have difficulty bathing yourself, getting dressed or grooming yourself? No   Do you have difficulty using the toilet? No   Do you have difficulty " moving around from place to place? No   Do you have trouble with steps or getting out of a bed or a chair? No   Current Diet Well Balanced Diet   Dental Exam Up to date   Eye Exam Up to date   Exercise (times per week) Other   Current Exercises Include Walking   Do you need help using the phone?  No   Are you deaf or do you have serious difficulty hearing?  No   Do you need help to go to places out of walking distance? No   Do you need help shopping? No   Do you need help preparing meals?  No   Do you need help with housework?  No   Do you need help with laundry? No   Do you need help taking your medications? No   Do you need help managing money? No   Do you ever drive or ride in a car without wearing a seat belt? No   Have you felt unusual stress, anger or loneliness in the last month? No   Who do you live with? Spouse   If you need help, do you have trouble finding someone available to you? No   Have you been bothered in the last four weeks by sexual problems? Yes   Do you have difficulty concentrating, remembering or making decisions? No       Age-appropriate Screening Schedule:  Refer to the list below for future screening recommendations based on patient's age, sex and/or medical conditions. Orders for these recommended tests are listed in the plan section. The patient has been provided with a written plan.    Health Maintenance   Topic Date Due    RSV Vaccine - Adults (1 - 1-dose 60+ series) Never done    LIPID PANEL  03/06/2024    COVID-19 Vaccine (3 - 2023-24 season) 07/19/2024 (Originally 9/1/2023)    Pneumococcal Vaccine 65+ (1 of 1 - PCV) 12/31/2024 (Originally 6/2/2015)    ZOSTER VACCINE (1 of 2) 12/31/2024 (Originally 6/2/2000)    COLORECTAL CANCER SCREENING  12/31/2024 (Originally 1950)    INFLUENZA VACCINE  08/01/2024    ANNUAL WELLNESS VISIT  04/29/2025    BMI FOLLOWUP  04/29/2025    TDAP/TD VACCINES (2 - Td or Tdap) 01/01/2026    HEPATITIS C SCREENING  Completed                  CMS Preventative  "Services Quick Reference  Risk Factors Identified During Encounter  None Identified  The above risks/problems have been discussed with the patient.  Pertinent information has been shared with the patient in the After Visit Summary.  An After Visit Summary and PPPS were made available to the patient.    Follow Up:   Next Medicare Wellness visit to be scheduled in 1 year.       Additional E&M Note during same encounter follows:  Patient has multiple medical problems which are significant and separately identifiable that require additional work above and beyond the Medicare Wellness Visit.      Chief Complaint  Medicare Wellness-Initial Visit    Subjective        HPI    He has been diagnosed with prostate cancer. He is seeing Dr. Ramirez, planning radical prostatectomy. He reports normal bone scan.           Objective   Vital Signs:  /78   Pulse 66   Temp 97.3 °F (36.3 °C)   Resp 14   Ht 177.8 cm (70\")   Wt 85.2 kg (187 lb 12.8 oz)   SpO2 97%   BMI 26.95 kg/m²     Physical Exam                    Assessment and Plan   Diagnoses and all orders for this visit:    1. Medicare annual wellness visit, initial (Primary)  -     Comprehensive Metabolic Panel; Future  -     Lipid Panel With / Chol / HDL Ratio; Future  -     CBC & Differential; Future    2. Essential hypertension  -     Comprehensive Metabolic Panel; Future  -     Lipid Panel With / Chol / HDL Ratio; Future  -     CBC & Differential; Future    3. High cholesterol  -     atorvastatin (LIPITOR) 40 MG tablet; Take 2 tablets by mouth Every Night.  Dispense: 180 tablet; Refill: 0    4. Hyperlipidemia LDL goal <70  -     Comprehensive Metabolic Panel; Future  -     Lipid Panel With / Chol / HDL Ratio; Future  -     CBC & Differential; Future    5. Overweight (BMI 25.0-29.9)  -     Comprehensive Metabolic Panel; Future  -     Lipid Panel With / Chol / HDL Ratio; Future  -     CBC & Differential; Future    6. BMI 26.0-26.9,adult  -     Comprehensive " Metabolic Panel; Future  -     Lipid Panel With / Chol / HDL Ratio; Future  -     CBC & Differential; Future    7. Prostate cancer  -     Comprehensive Metabolic Panel; Future  -     Lipid Panel With / Chol / HDL Ratio; Future  -     CBC & Differential; Future    Return fasting to update labs          Follow Up   Return in about 1 year (around 4/29/2025) for Medicare Wellness.  Patient was given instructions and counseling regarding his condition or for health maintenance advice. Please see specific information pulled into the AVS if appropriate.

## 2024-04-29 NOTE — PATIENT INSTRUCTIONS
Medicare Wellness  Personal Prevention Plan of Service     Date of Office Visit:    Encounter Provider:  Vannessa Tuttle DO  Place of Service:  [facility]  Patient Name: Cayden Nielsen  :  1950    As part of the Medicare Wellness portion of your visit today, we are providing you with this personalized preventive plan of services (PPPS). This plan is based upon recommendations of the [place] Preventive Services Task Force (USPSTF) and the Advisory Committee on Immunization Practices (ACIP).    This lists the preventive care services that should be considered, and provides dates of when you are due. Items listed as completed are up-to-date and do not require any further intervention.    Health Maintenance   Topic Date Due    RSV Vaccine - Adults (1 - 1-dose 60+ series) Never done    BMI FOLLOWUP  10/19/2023    LIPID PANEL  2024    COVID-19 Vaccine (3 - -24 season) 2024 (Originally 2023)    Pneumococcal Vaccine 65+ (1 of 1 - PCV) 2024 (Originally 2015)    ZOSTER VACCINE (1 of 2) 2024 (Originally 2000)    COLORECTAL CANCER SCREENING  2024 (Originally 1950)    INFLUENZA VACCINE  2024    ANNUAL WELLNESS VISIT  2025    TDAP/TD VACCINES (2 - Td or Tdap) 2026    HEPATITIS C SCREENING  Completed       Orders Placed This Encounter   Procedures    Comprehensive Metabolic Panel     Standing Status:   Future     Standing Expiration Date:   2025     Order Specific Question:   Release to patient     Answer:   Routine Release [[id number]]    Lipid Panel With / Chol / HDL Ratio     Standing Status:   Future     Standing Expiration Date:   2025     Order Specific Question:   Release to patient     Answer:   Routine Release [[id number]]    CBC & Differential     Standing Status:   Future     Standing Expiration Date:   2025     Order Specific Question:   Manual Differential     Answer:   No     Order  Specific Question:   Release to patient     Answer:   Routine Release [[id number]]       Return in about 1 year (around 4/29/2025) for Medicare Wellness.

## 2024-05-02 ENCOUNTER — LAB (OUTPATIENT)
Dept: FAMILY MEDICINE CLINIC | Facility: CLINIC | Age: 74
End: 2024-05-02
Payer: MEDICARE

## 2024-05-02 DIAGNOSIS — E78.5 HYPERLIPIDEMIA LDL GOAL <70: ICD-10-CM

## 2024-05-02 DIAGNOSIS — Z00.00 MEDICARE ANNUAL WELLNESS VISIT, INITIAL: ICD-10-CM

## 2024-05-02 DIAGNOSIS — E66.3 OVERWEIGHT (BMI 25.0-29.9): ICD-10-CM

## 2024-05-02 DIAGNOSIS — C61 PROSTATE CANCER: ICD-10-CM

## 2024-05-02 DIAGNOSIS — I10 ESSENTIAL HYPERTENSION: ICD-10-CM

## 2024-05-02 LAB
ALBUMIN SERPL-MCNC: 4 G/DL (ref 3.5–5.2)
ALBUMIN/GLOB SERPL: 1.6 G/DL
ALP SERPL-CCNC: 122 U/L (ref 39–117)
ALT SERPL-CCNC: 21 U/L (ref 1–41)
AST SERPL-CCNC: 29 U/L (ref 1–40)
BASOPHILS # BLD AUTO: 0.06 10*3/MM3 (ref 0–0.2)
BASOPHILS NFR BLD AUTO: 0.8 % (ref 0–1.5)
BILIRUB SERPL-MCNC: 0.5 MG/DL (ref 0–1.2)
BUN SERPL-MCNC: 20 MG/DL (ref 8–23)
BUN/CREAT SERPL: 18.3 (ref 7–25)
CALCIUM SERPL-MCNC: 8.8 MG/DL (ref 8.6–10.5)
CHLORIDE SERPL-SCNC: 107 MMOL/L (ref 98–107)
CHOLEST SERPL-MCNC: 123 MG/DL (ref 0–200)
CHOLEST/HDLC SERPL: 2.51 {RATIO}
CO2 SERPL-SCNC: 24.3 MMOL/L (ref 22–29)
CREAT SERPL-MCNC: 1.09 MG/DL (ref 0.76–1.27)
EGFRCR SERPLBLD CKD-EPI 2021: 71.7 ML/MIN/1.73
EOSINOPHIL # BLD AUTO: 0.4 10*3/MM3 (ref 0–0.4)
EOSINOPHIL NFR BLD AUTO: 5.1 % (ref 0.3–6.2)
ERYTHROCYTE [DISTWIDTH] IN BLOOD BY AUTOMATED COUNT: 13.6 % (ref 12.3–15.4)
GLOBULIN SER CALC-MCNC: 2.5 GM/DL
GLUCOSE SERPL-MCNC: 93 MG/DL (ref 65–99)
HCT VFR BLD AUTO: 44 % (ref 37.5–51)
HDLC SERPL-MCNC: 49 MG/DL (ref 40–60)
HGB BLD-MCNC: 13.9 G/DL (ref 13–17.7)
IMM GRANULOCYTES # BLD AUTO: 0.02 10*3/MM3 (ref 0–0.05)
IMM GRANULOCYTES NFR BLD AUTO: 0.3 % (ref 0–0.5)
LDLC SERPL CALC-MCNC: 61 MG/DL (ref 0–100)
LYMPHOCYTES # BLD AUTO: 1.64 10*3/MM3 (ref 0.7–3.1)
LYMPHOCYTES NFR BLD AUTO: 20.9 % (ref 19.6–45.3)
MCH RBC QN AUTO: 28.1 PG (ref 26.6–33)
MCHC RBC AUTO-ENTMCNC: 31.6 G/DL (ref 31.5–35.7)
MCV RBC AUTO: 88.9 FL (ref 79–97)
MONOCYTES # BLD AUTO: 0.62 10*3/MM3 (ref 0.1–0.9)
MONOCYTES NFR BLD AUTO: 7.9 % (ref 5–12)
NEUTROPHILS # BLD AUTO: 5.09 10*3/MM3 (ref 1.7–7)
NEUTROPHILS NFR BLD AUTO: 65 % (ref 42.7–76)
NRBC BLD AUTO-RTO: 0 /100 WBC (ref 0–0.2)
PLATELET # BLD AUTO: 184 10*3/MM3 (ref 140–450)
POTASSIUM SERPL-SCNC: 4.2 MMOL/L (ref 3.5–5.2)
PROT SERPL-MCNC: 6.5 G/DL (ref 6–8.5)
RBC # BLD AUTO: 4.95 10*6/MM3 (ref 4.14–5.8)
SODIUM SERPL-SCNC: 143 MMOL/L (ref 136–145)
TRIGL SERPL-MCNC: 62 MG/DL (ref 0–150)
VLDLC SERPL CALC-MCNC: 13 MG/DL (ref 5–40)
WBC # BLD AUTO: 7.83 10*3/MM3 (ref 3.4–10.8)

## 2024-06-12 ENCOUNTER — HOSPITAL ENCOUNTER (OUTPATIENT)
Dept: GENERAL RADIOLOGY | Facility: HOSPITAL | Age: 74
Discharge: HOME | End: 2024-06-12
Payer: MEDICARE

## 2024-06-12 ENCOUNTER — PRE-ADMISSION TESTING (OUTPATIENT)
Dept: PREADMISSION TESTING | Facility: HOSPITAL | Age: 74
End: 2024-06-12
Payer: MEDICARE

## 2024-06-12 VITALS — HEIGHT: 70 IN | BODY MASS INDEX: 26.94 KG/M2 | WEIGHT: 188.16 LBS

## 2024-06-12 LAB
ANION GAP SERPL CALCULATED.3IONS-SCNC: 8 MMOL/L (ref 5–15)
BILIRUB UR QL STRIP: NEGATIVE
BUN SERPL-MCNC: 23 MG/DL (ref 8–23)
BUN/CREAT SERPL: 19.3 (ref 7–25)
CALCIUM SPEC-SCNC: 8.8 MG/DL (ref 8.6–10.5)
CHLORIDE SERPL-SCNC: 105 MMOL/L (ref 98–107)
CLARITY UR: CLEAR
CO2 SERPL-SCNC: 25 MMOL/L (ref 22–29)
COLOR UR: YELLOW
CREAT SERPL-MCNC: 1.19 MG/DL (ref 0.76–1.27)
DEPRECATED RDW RBC AUTO: 49.1 FL (ref 37–54)
EGFRCR SERPLBLD CKD-EPI 2021: 64.1 ML/MIN/1.73
ERYTHROCYTE [DISTWIDTH] IN BLOOD BY AUTOMATED COUNT: 14.6 % (ref 12.3–15.4)
GLUCOSE SERPL-MCNC: 90 MG/DL (ref 65–99)
GLUCOSE UR STRIP-MCNC: NEGATIVE MG/DL
HCT VFR BLD AUTO: 42.6 % (ref 37.5–51)
HGB BLD-MCNC: 13.7 G/DL (ref 13–17.7)
HGB UR QL STRIP.AUTO: NEGATIVE
KETONES UR QL STRIP: NEGATIVE
LEUKOCYTE ESTERASE UR QL STRIP.AUTO: NEGATIVE
MCH RBC QN AUTO: 29.6 PG (ref 26.6–33)
MCHC RBC AUTO-ENTMCNC: 32.2 G/DL (ref 31.5–35.7)
MCV RBC AUTO: 92 FL (ref 79–97)
NITRITE UR QL STRIP: NEGATIVE
PH UR STRIP.AUTO: 6 [PH] (ref 5–8)
PLATELET # BLD AUTO: 184 10*3/MM3 (ref 140–450)
PMV BLD AUTO: 10.3 FL (ref 6–12)
POTASSIUM SERPL-SCNC: 4.4 MMOL/L (ref 3.5–5.2)
PROT UR QL STRIP: NEGATIVE
RBC # BLD AUTO: 4.63 10*6/MM3 (ref 4.14–5.8)
SODIUM SERPL-SCNC: 138 MMOL/L (ref 136–145)
SP GR UR STRIP: 1.02 (ref 1–1.03)
UROBILINOGEN UR QL STRIP: NORMAL
WBC NRBC COR # BLD AUTO: 8.1 10*3/MM3 (ref 3.4–10.8)

## 2024-06-12 PROCEDURE — 85027 COMPLETE CBC AUTOMATED: CPT

## 2024-06-12 PROCEDURE — 36415 COLL VENOUS BLD VENIPUNCTURE: CPT

## 2024-06-12 PROCEDURE — 93010 ELECTROCARDIOGRAM REPORT: CPT | Performed by: INTERNAL MEDICINE

## 2024-06-12 PROCEDURE — 93005 ELECTROCARDIOGRAM TRACING: CPT

## 2024-06-12 PROCEDURE — 71046 X-RAY EXAM CHEST 2 VIEWS: CPT

## 2024-06-12 PROCEDURE — 81003 URINALYSIS AUTO W/O SCOPE: CPT

## 2024-06-12 PROCEDURE — 80048 BASIC METABOLIC PNL TOTAL CA: CPT

## 2024-06-12 RX ORDER — ATORVASTATIN CALCIUM 80 MG/1
1 TABLET, FILM COATED ORAL DAILY
COMMUNITY

## 2024-06-12 NOTE — PAT
An arrival time for procedure was not provided during PAT visit. If patient had any questions or concerns about their arrival time, they were instructed to contact their surgeon/physician.  Additionally, if the patient referred to an arrival time that was acquired from their my chart account, patient was encouraged to verify that time with their surgeon/physician. Arrival times are NOT provided in Pre Admission Testing Department.    Patient viewed general PAT education video as instructed in their preoperative information received from their surgeon.  Patient stated the general PAT education video was viewed in its entirety and survey completed.  Copies of PAT general education handouts (Incentive Spirometry, Meds to Beds Program, Patient Belongings, Pre-op skin preparation instructions, Blood Glucose testing, Visitor policy, Surgery FAQ, Code H) distributed to patient if not printed. Education related to the PAT pass and skin preparation for surgery (if applicable) completed in PAT as a reinforcement to PAT education video. Patient instructed to return PAT pass provided today as well as completed skin preparation sheet (if applicable) on the day of procedure.     Additionally if patient had not viewed video yet but intended to view it at home or in our waiting area, then referred them to the handout with QR code/link provided during PAT visit.  Instructed patient to complete survey after viewing the video in its entirety.  Encouraged patient/family to read PAT general education handouts thoroughly and notify PAT staff with any questions or concerns. Patient verbalized understanding of all information and priority content.    Patient denies any current skin issues.     Patient directed to Radiology Department for CXR after Pre Admission Testing Appointment.     EKG IN CHART. CARDIAC CLEARANCE REQUESTED FROM DR. CORBIN'S OFFICE.

## 2024-06-14 ENCOUNTER — TELEPHONE (OUTPATIENT)
Dept: CARDIOLOGY | Facility: CLINIC | Age: 74
End: 2024-06-14
Payer: MEDICARE

## 2024-06-14 LAB
QT INTERVAL: 426 MS
QTC INTERVAL: 403 MS

## 2024-06-14 NOTE — TELEPHONE ENCOUNTER
Requesting pre op risk assessment for robotic prostatectomy with Dr. Nunez scheduled for 06/18/2024

## 2024-06-14 NOTE — PAT
Verified patient previously completed cardiology visit for cardiac risk assessment in preparation for upcoming procedure, completion of 12-lead ECG within six months, and risk assessment letter reviewed. No further interventions required.      Cleared by Dr Olvera as low risk.  Chart forwarded to pre op.

## 2024-06-17 ENCOUNTER — ANESTHESIA EVENT (OUTPATIENT)
Dept: PERIOP | Facility: HOSPITAL | Age: 74
End: 2024-06-17
Payer: MEDICARE

## 2024-06-17 RX ORDER — FAMOTIDINE 10 MG/ML
20 INJECTION, SOLUTION INTRAVENOUS ONCE
Status: CANCELLED | OUTPATIENT
Start: 2024-06-17 | End: 2024-06-17

## 2024-06-18 ENCOUNTER — ANESTHESIA (OUTPATIENT)
Dept: PERIOP | Facility: HOSPITAL | Age: 74
End: 2024-06-18
Payer: MEDICARE

## 2024-06-18 ENCOUNTER — ANESTHESIA EVENT CONVERTED (OUTPATIENT)
Dept: ANESTHESIOLOGY | Facility: HOSPITAL | Age: 74
End: 2024-06-18
Payer: MEDICARE

## 2024-06-18 ENCOUNTER — HOSPITAL ENCOUNTER (OUTPATIENT)
Facility: HOSPITAL | Age: 74
Discharge: HOME | End: 2024-06-20
Attending: UROLOGY | Admitting: UROLOGY
Payer: MEDICARE

## 2024-06-18 DIAGNOSIS — C61 PROSTATE CANCER: Primary | ICD-10-CM

## 2024-06-18 PROBLEM — I25.10 CAD (CORONARY ARTERY DISEASE): Status: ACTIVE | Noted: 2024-06-18

## 2024-06-18 PROBLEM — Z90.79 S/P PROSTATECTOMY: Status: ACTIVE | Noted: 2024-06-18

## 2024-06-18 PROCEDURE — 63710000001 METOPROLOL TARTRATE 25 MG TABLET: Performed by: UROLOGY

## 2024-06-18 PROCEDURE — A9270 NON-COVERED ITEM OR SERVICE: HCPCS | Performed by: UROLOGY

## 2024-06-18 PROCEDURE — 25810000003 LACTATED RINGERS PER 1000 ML: Performed by: ANESTHESIOLOGY

## 2024-06-18 PROCEDURE — 25010000002 CEFOXITIN PER 1 G: Performed by: UROLOGY

## 2024-06-18 PROCEDURE — 88309 TISSUE EXAM BY PATHOLOGIST: CPT | Performed by: UROLOGY

## 2024-06-18 PROCEDURE — 63710000001 GABAPENTIN 100 MG CAPSULE: Performed by: UROLOGY

## 2024-06-18 PROCEDURE — 25010000002 GLYCOPYRROLATE 1 MG/5ML SOLUTION

## 2024-06-18 PROCEDURE — 25010000002 PROPOFOL 10 MG/ML EMULSION: Performed by: NURSE ANESTHETIST, CERTIFIED REGISTERED

## 2024-06-18 PROCEDURE — A9270 NON-COVERED ITEM OR SERVICE: HCPCS | Performed by: ANESTHESIOLOGY

## 2024-06-18 PROCEDURE — 25010000002 BUPIVACAINE (PF) 0.25 % SOLUTION: Performed by: NURSE ANESTHETIST, CERTIFIED REGISTERED

## 2024-06-18 PROCEDURE — 25010000002 DEXAMETHASONE SODIUM PHOSPHATE 10 MG/ML SOLUTION: Performed by: NURSE ANESTHETIST, CERTIFIED REGISTERED

## 2024-06-18 PROCEDURE — 25010000002 SUGAMMADEX 200 MG/2ML SOLUTION: Performed by: NURSE ANESTHETIST, CERTIFIED REGISTERED

## 2024-06-18 PROCEDURE — 25010000002 ONDANSETRON PER 1 MG: Performed by: NURSE ANESTHETIST, CERTIFIED REGISTERED

## 2024-06-18 PROCEDURE — 63710000001 ACETAMINOPHEN 325 MG TABLET: Performed by: UROLOGY

## 2024-06-18 PROCEDURE — 63710000001 FAMOTIDINE 20 MG TABLET: Performed by: ANESTHESIOLOGY

## 2024-06-18 PROCEDURE — 25010000002 FENTANYL CITRATE (PF) 50 MCG/ML SOLUTION

## 2024-06-18 PROCEDURE — 88305 TISSUE EXAM BY PATHOLOGIST: CPT | Performed by: UROLOGY

## 2024-06-18 PROCEDURE — 25010000002 SODIUM CHLORIDE 0.9 % WITH KCL 20 MEQ 20-0.9 MEQ/L-% SOLUTION: Performed by: UROLOGY

## 2024-06-18 PROCEDURE — 25010000002 MORPHINE PER 10 MG: Performed by: NURSE ANESTHETIST, CERTIFIED REGISTERED

## 2024-06-18 PROCEDURE — 25810000003 SODIUM CHLORIDE PER 500 ML: Performed by: UROLOGY

## 2024-06-18 DEVICE — GRFT AMNIO UMB CORD PRO3C THICK 3X6CM: Type: IMPLANTABLE DEVICE | Site: PELVIS | Status: FUNCTIONAL

## 2024-06-18 RX ORDER — LIDOCAINE HYDROCHLORIDE 10 MG/ML
0.5 INJECTION, SOLUTION EPIDURAL; INFILTRATION; INTRACAUDAL; PERINEURAL ONCE AS NEEDED
Status: COMPLETED | OUTPATIENT
Start: 2024-06-18 | End: 2024-06-18

## 2024-06-18 RX ORDER — LABETALOL HYDROCHLORIDE 5 MG/ML
5 INJECTION, SOLUTION INTRAVENOUS
Status: DISCONTINUED | OUTPATIENT
Start: 2024-06-18 | End: 2024-06-18 | Stop reason: HOSPADM

## 2024-06-18 RX ORDER — DROPERIDOL 2.5 MG/ML
0.62 INJECTION, SOLUTION INTRAMUSCULAR; INTRAVENOUS
Status: DISCONTINUED | OUTPATIENT
Start: 2024-06-18 | End: 2024-06-18 | Stop reason: HOSPADM

## 2024-06-18 RX ORDER — ONDANSETRON 4 MG/1
4 TABLET, ORALLY DISINTEGRATING ORAL EVERY 6 HOURS PRN
Status: DISCONTINUED | OUTPATIENT
Start: 2024-06-18 | End: 2024-06-20 | Stop reason: HOSPADM

## 2024-06-18 RX ORDER — ROCURONIUM BROMIDE 10 MG/ML
INJECTION, SOLUTION INTRAVENOUS AS NEEDED
Status: DISCONTINUED | OUTPATIENT
Start: 2024-06-18 | End: 2024-06-18 | Stop reason: SURG

## 2024-06-18 RX ORDER — MIDAZOLAM HYDROCHLORIDE 1 MG/ML
0.5 INJECTION INTRAMUSCULAR; INTRAVENOUS
Status: DISCONTINUED | OUTPATIENT
Start: 2024-06-18 | End: 2024-06-18 | Stop reason: HOSPADM

## 2024-06-18 RX ORDER — PHENYLEPHRINE HCL IN 0.9% NACL 1 MG/10 ML
SYRINGE (ML) INTRAVENOUS AS NEEDED
Status: DISCONTINUED | OUTPATIENT
Start: 2024-06-18 | End: 2024-06-18 | Stop reason: SURG

## 2024-06-18 RX ORDER — LABETALOL HYDROCHLORIDE 5 MG/ML
10 INJECTION, SOLUTION INTRAVENOUS EVERY 4 HOURS PRN
Status: DISCONTINUED | OUTPATIENT
Start: 2024-06-18 | End: 2024-06-20 | Stop reason: HOSPADM

## 2024-06-18 RX ORDER — GLYCOPYRROLATE 0.2 MG/ML
0.2 INJECTION INTRAMUSCULAR; INTRAVENOUS ONCE
Status: COMPLETED | OUTPATIENT
Start: 2024-06-18 | End: 2024-06-18

## 2024-06-18 RX ORDER — SODIUM CHLORIDE 0.9 % (FLUSH) 0.9 %
3-10 SYRINGE (ML) INJECTION AS NEEDED
Status: DISCONTINUED | OUTPATIENT
Start: 2024-06-18 | End: 2024-06-18 | Stop reason: HOSPADM

## 2024-06-18 RX ORDER — SODIUM CHLORIDE 0.9 % (FLUSH) 0.9 %
10 SYRINGE (ML) INJECTION EVERY 12 HOURS SCHEDULED
Status: DISCONTINUED | OUTPATIENT
Start: 2024-06-18 | End: 2024-06-18 | Stop reason: HOSPADM

## 2024-06-18 RX ORDER — NITROFURANTOIN 25; 75 MG/1; MG/1
100 CAPSULE ORAL 2 TIMES DAILY
Qty: 14 CAPSULE | Refills: 0 | Status: SHIPPED | OUTPATIENT
Start: 2024-06-18

## 2024-06-18 RX ORDER — NALOXONE HCL 0.4 MG/ML
0.1 VIAL (ML) INJECTION
Status: DISCONTINUED | OUTPATIENT
Start: 2024-06-18 | End: 2024-06-20 | Stop reason: HOSPADM

## 2024-06-18 RX ORDER — ONDANSETRON 2 MG/ML
4 INJECTION INTRAMUSCULAR; INTRAVENOUS ONCE AS NEEDED
Status: DISCONTINUED | OUTPATIENT
Start: 2024-06-18 | End: 2024-06-18 | Stop reason: HOSPADM

## 2024-06-18 RX ORDER — NALOXONE HCL 0.4 MG/ML
0.4 VIAL (ML) INJECTION AS NEEDED
Status: DISCONTINUED | OUTPATIENT
Start: 2024-06-18 | End: 2024-06-18 | Stop reason: HOSPADM

## 2024-06-18 RX ORDER — PROMETHAZINE HYDROCHLORIDE 25 MG/1
25 TABLET ORAL ONCE AS NEEDED
Status: DISCONTINUED | OUTPATIENT
Start: 2024-06-18 | End: 2024-06-18 | Stop reason: HOSPADM

## 2024-06-18 RX ORDER — DROPERIDOL 2.5 MG/ML
0.62 INJECTION, SOLUTION INTRAMUSCULAR; INTRAVENOUS ONCE AS NEEDED
Status: DISCONTINUED | OUTPATIENT
Start: 2024-06-18 | End: 2024-06-18 | Stop reason: HOSPADM

## 2024-06-18 RX ORDER — HYDROMORPHONE HYDROCHLORIDE 1 MG/ML
0.5 INJECTION, SOLUTION INTRAMUSCULAR; INTRAVENOUS; SUBCUTANEOUS
Status: DISCONTINUED | OUTPATIENT
Start: 2024-06-18 | End: 2024-06-18 | Stop reason: HOSPADM

## 2024-06-18 RX ORDER — ACETAMINOPHEN 325 MG/1
650 TABLET ORAL EVERY 6 HOURS
Status: COMPLETED | OUTPATIENT
Start: 2024-06-18 | End: 2024-06-20

## 2024-06-18 RX ORDER — OXYCODONE HYDROCHLORIDE 5 MG/1
5 TABLET ORAL EVERY 4 HOURS PRN
Status: DISCONTINUED | OUTPATIENT
Start: 2024-06-18 | End: 2024-06-20 | Stop reason: HOSPADM

## 2024-06-18 RX ORDER — DOCUSATE SODIUM 100 MG/1
100 CAPSULE, LIQUID FILLED ORAL 2 TIMES DAILY PRN
Status: DISCONTINUED | OUTPATIENT
Start: 2024-06-18 | End: 2024-06-20 | Stop reason: HOSPADM

## 2024-06-18 RX ORDER — DOCUSATE SODIUM 100 MG/1
100 CAPSULE, LIQUID FILLED ORAL DAILY PRN
Qty: 30 CAPSULE | Refills: 1 | Status: SHIPPED | OUTPATIENT
Start: 2024-06-18 | End: 2025-06-18

## 2024-06-18 RX ORDER — SODIUM CHLORIDE 0.9 % (FLUSH) 0.9 %
10 SYRINGE (ML) INJECTION AS NEEDED
Status: DISCONTINUED | OUTPATIENT
Start: 2024-06-18 | End: 2024-06-18 | Stop reason: HOSPADM

## 2024-06-18 RX ORDER — ONDANSETRON 2 MG/ML
INJECTION INTRAMUSCULAR; INTRAVENOUS AS NEEDED
Status: DISCONTINUED | OUTPATIENT
Start: 2024-06-18 | End: 2024-06-18 | Stop reason: SURG

## 2024-06-18 RX ORDER — ACETAMINOPHEN 160 MG/5ML
650 SOLUTION ORAL EVERY 6 HOURS
Status: COMPLETED | OUTPATIENT
Start: 2024-06-18 | End: 2024-06-20

## 2024-06-18 RX ORDER — LIDOCAINE HYDROCHLORIDE 20 MG/ML
JELLY TOPICAL AS NEEDED
Status: DISCONTINUED | OUTPATIENT
Start: 2024-06-18 | End: 2024-06-18 | Stop reason: HOSPADM

## 2024-06-18 RX ORDER — HYDROCODONE BITARTRATE AND ACETAMINOPHEN 5; 325 MG/1; MG/1
1 TABLET ORAL ONCE AS NEEDED
Status: DISCONTINUED | OUTPATIENT
Start: 2024-06-18 | End: 2024-06-18 | Stop reason: HOSPADM

## 2024-06-18 RX ORDER — DEXAMETHASONE SODIUM PHOSPHATE 10 MG/ML
INJECTION, SOLUTION INTRAMUSCULAR; INTRAVENOUS AS NEEDED
Status: DISCONTINUED | OUTPATIENT
Start: 2024-06-18 | End: 2024-06-18 | Stop reason: SURG

## 2024-06-18 RX ORDER — HYDROCODONE BITARTRATE AND ACETAMINOPHEN 7.5; 325 MG/1; MG/1
1 TABLET ORAL EVERY 6 HOURS PRN
Qty: 30 TABLET | Refills: 0 | Status: SHIPPED | OUTPATIENT
Start: 2024-06-18

## 2024-06-18 RX ORDER — BUPIVACAINE HYDROCHLORIDE 2.5 MG/ML
INJECTION, SOLUTION EPIDURAL; INFILTRATION; INTRACAUDAL
Status: COMPLETED | OUTPATIENT
Start: 2024-06-18 | End: 2024-06-18

## 2024-06-18 RX ORDER — FENTANYL CITRATE 50 UG/ML
50 INJECTION, SOLUTION INTRAMUSCULAR; INTRAVENOUS
Status: DISCONTINUED | OUTPATIENT
Start: 2024-06-18 | End: 2024-06-18 | Stop reason: HOSPADM

## 2024-06-18 RX ORDER — SODIUM CHLORIDE 9 MG/ML
40 INJECTION, SOLUTION INTRAVENOUS AS NEEDED
Status: DISCONTINUED | OUTPATIENT
Start: 2024-06-18 | End: 2024-06-18 | Stop reason: HOSPADM

## 2024-06-18 RX ORDER — PROPOFOL 10 MG/ML
VIAL (ML) INTRAVENOUS AS NEEDED
Status: DISCONTINUED | OUTPATIENT
Start: 2024-06-18 | End: 2024-06-18 | Stop reason: SURG

## 2024-06-18 RX ORDER — FENTANYL CITRATE 50 UG/ML
INJECTION, SOLUTION INTRAMUSCULAR; INTRAVENOUS
Status: COMPLETED
Start: 2024-06-18 | End: 2024-06-18

## 2024-06-18 RX ORDER — GLYCOPYRROLATE 0.2 MG/ML
INJECTION INTRAMUSCULAR; INTRAVENOUS
Status: COMPLETED
Start: 2024-06-18 | End: 2024-06-18

## 2024-06-18 RX ORDER — HYDROMORPHONE HYDROCHLORIDE 1 MG/ML
0.5 INJECTION, SOLUTION INTRAMUSCULAR; INTRAVENOUS; SUBCUTANEOUS
Status: DISCONTINUED | OUTPATIENT
Start: 2024-06-18 | End: 2024-06-20 | Stop reason: HOSPADM

## 2024-06-18 RX ORDER — ONDANSETRON 2 MG/ML
4 INJECTION INTRAMUSCULAR; INTRAVENOUS EVERY 6 HOURS PRN
Status: DISCONTINUED | OUTPATIENT
Start: 2024-06-18 | End: 2024-06-20 | Stop reason: HOSPADM

## 2024-06-18 RX ORDER — EPHEDRINE SULFATE 50 MG/ML
INJECTION INTRAVENOUS AS NEEDED
Status: DISCONTINUED | OUTPATIENT
Start: 2024-06-18 | End: 2024-06-18 | Stop reason: SURG

## 2024-06-18 RX ORDER — ENOXAPARIN SODIUM 100 MG/ML
40 INJECTION SUBCUTANEOUS DAILY
Status: DISCONTINUED | OUTPATIENT
Start: 2024-06-19 | End: 2024-06-20 | Stop reason: HOSPADM

## 2024-06-18 RX ORDER — SODIUM CHLORIDE 9 MG/ML
INJECTION, SOLUTION INTRAVENOUS AS NEEDED
Status: DISCONTINUED | OUTPATIENT
Start: 2024-06-18 | End: 2024-06-18 | Stop reason: HOSPADM

## 2024-06-18 RX ORDER — FIBRINOGEN HUMAN, HUMAN THROMBIN 4 ML
KIT TOPICAL AS NEEDED
Status: DISCONTINUED | OUTPATIENT
Start: 2024-06-18 | End: 2024-06-18 | Stop reason: HOSPADM

## 2024-06-18 RX ORDER — IPRATROPIUM BROMIDE AND ALBUTEROL SULFATE 2.5; .5 MG/3ML; MG/3ML
3 SOLUTION RESPIRATORY (INHALATION) ONCE AS NEEDED
Status: DISCONTINUED | OUTPATIENT
Start: 2024-06-18 | End: 2024-06-18 | Stop reason: HOSPADM

## 2024-06-18 RX ORDER — SODIUM CHLORIDE, SODIUM LACTATE, POTASSIUM CHLORIDE, CALCIUM CHLORIDE 600; 310; 30; 20 MG/100ML; MG/100ML; MG/100ML; MG/100ML
9 INJECTION, SOLUTION INTRAVENOUS CONTINUOUS
Status: DISCONTINUED | OUTPATIENT
Start: 2024-06-18 | End: 2024-06-20

## 2024-06-18 RX ORDER — LIDOCAINE HYDROCHLORIDE 10 MG/ML
INJECTION, SOLUTION EPIDURAL; INFILTRATION; INTRACAUDAL; PERINEURAL AS NEEDED
Status: DISCONTINUED | OUTPATIENT
Start: 2024-06-18 | End: 2024-06-18 | Stop reason: SURG

## 2024-06-18 RX ORDER — ATORVASTATIN CALCIUM 40 MG/1
80 TABLET, FILM COATED ORAL DAILY
Status: DISCONTINUED | OUTPATIENT
Start: 2024-06-19 | End: 2024-06-20 | Stop reason: HOSPADM

## 2024-06-18 RX ORDER — ACETAMINOPHEN 650 MG/1
650 SUPPOSITORY RECTAL EVERY 6 HOURS
Status: COMPLETED | OUTPATIENT
Start: 2024-06-18 | End: 2024-06-20

## 2024-06-18 RX ORDER — SODIUM CHLORIDE AND POTASSIUM CHLORIDE 150; 900 MG/100ML; MG/100ML
100 INJECTION, SOLUTION INTRAVENOUS CONTINUOUS
Status: DISCONTINUED | OUTPATIENT
Start: 2024-06-18 | End: 2024-06-20 | Stop reason: HOSPADM

## 2024-06-18 RX ORDER — SODIUM CHLORIDE 0.9 % (FLUSH) 0.9 %
3 SYRINGE (ML) INJECTION EVERY 12 HOURS SCHEDULED
Status: DISCONTINUED | OUTPATIENT
Start: 2024-06-18 | End: 2024-06-18 | Stop reason: HOSPADM

## 2024-06-18 RX ORDER — FAMOTIDINE 20 MG/1
20 TABLET, FILM COATED ORAL ONCE
Status: COMPLETED | OUTPATIENT
Start: 2024-06-18 | End: 2024-06-18

## 2024-06-18 RX ORDER — PANTOPRAZOLE SODIUM 40 MG/1
40 TABLET, DELAYED RELEASE ORAL
Status: DISCONTINUED | OUTPATIENT
Start: 2024-06-19 | End: 2024-06-20 | Stop reason: HOSPADM

## 2024-06-18 RX ORDER — PROMETHAZINE HYDROCHLORIDE 25 MG/1
25 SUPPOSITORY RECTAL ONCE AS NEEDED
Status: DISCONTINUED | OUTPATIENT
Start: 2024-06-18 | End: 2024-06-18 | Stop reason: HOSPADM

## 2024-06-18 RX ORDER — DEXAMETHASONE SODIUM PHOSPHATE 10 MG/ML
INJECTION, SOLUTION INTRAMUSCULAR; INTRAVENOUS
Status: COMPLETED | OUTPATIENT
Start: 2024-06-18 | End: 2024-06-18

## 2024-06-18 RX ORDER — GABAPENTIN 100 MG/1
100 CAPSULE ORAL 3 TIMES DAILY
Status: COMPLETED | OUTPATIENT
Start: 2024-06-18 | End: 2024-06-20

## 2024-06-18 RX ORDER — HYDRALAZINE HYDROCHLORIDE 20 MG/ML
5 INJECTION INTRAMUSCULAR; INTRAVENOUS
Status: DISCONTINUED | OUTPATIENT
Start: 2024-06-18 | End: 2024-06-18 | Stop reason: HOSPADM

## 2024-06-18 RX ORDER — BICALUTAMIDE 50 MG/1
50 TABLET, FILM COATED ORAL DAILY
Status: DISCONTINUED | OUTPATIENT
Start: 2024-06-19 | End: 2024-06-20 | Stop reason: HOSPADM

## 2024-06-18 RX ORDER — MEPERIDINE HYDROCHLORIDE 25 MG/ML
12.5 INJECTION INTRAMUSCULAR; INTRAVENOUS; SUBCUTANEOUS
Status: DISCONTINUED | OUTPATIENT
Start: 2024-06-18 | End: 2024-06-18 | Stop reason: HOSPADM

## 2024-06-18 RX ADMIN — MORPHINE SULFATE 2 MG: 4 INJECTION, SOLUTION INTRAMUSCULAR; INTRAVENOUS at 12:01

## 2024-06-18 RX ADMIN — ONDANSETRON 4 MG: 2 INJECTION INTRAMUSCULAR; INTRAVENOUS at 12:55

## 2024-06-18 RX ADMIN — MORPHINE SULFATE 2 MG: 4 INJECTION, SOLUTION INTRAMUSCULAR; INTRAVENOUS at 11:50

## 2024-06-18 RX ADMIN — GABAPENTIN 100 MG: 100 CAPSULE ORAL at 17:04

## 2024-06-18 RX ADMIN — Medication 100 MCG: at 12:34

## 2024-06-18 RX ADMIN — SUGAMMADEX 200 MG: 100 INJECTION, SOLUTION INTRAVENOUS at 12:55

## 2024-06-18 RX ADMIN — ROCURONIUM BROMIDE 20 MG: 10 INJECTION INTRAVENOUS at 12:26

## 2024-06-18 RX ADMIN — CEFOXITIN SODIUM 1000 MG: 1 POWDER, FOR SOLUTION INTRAVENOUS at 21:35

## 2024-06-18 RX ADMIN — GABAPENTIN 100 MG: 100 CAPSULE ORAL at 21:35

## 2024-06-18 RX ADMIN — ACETAMINOPHEN 650 MG: 325 TABLET ORAL at 22:18

## 2024-06-18 RX ADMIN — POTASSIUM CHLORIDE AND SODIUM CHLORIDE 100 ML/HR: 900; 150 INJECTION, SOLUTION INTRAVENOUS at 17:04

## 2024-06-18 RX ADMIN — FENTANYL CITRATE 50 MCG: 50 INJECTION, SOLUTION INTRAMUSCULAR; INTRAVENOUS at 14:15

## 2024-06-18 RX ADMIN — DEXAMETHASONE SODIUM PHOSPHATE 4 MG: 10 INJECTION, SOLUTION INTRAMUSCULAR; INTRAVENOUS at 11:38

## 2024-06-18 RX ADMIN — PROPOFOL 150 MG: 10 INJECTION, EMULSION INTRAVENOUS at 11:33

## 2024-06-18 RX ADMIN — CEFOXITIN SODIUM 2000 MG: 2 POWDER, FOR SOLUTION INTRAVENOUS at 11:30

## 2024-06-18 RX ADMIN — FAMOTIDINE 20 MG: 20 TABLET, FILM COATED ORAL at 08:02

## 2024-06-18 RX ADMIN — SODIUM CHLORIDE, POTASSIUM CHLORIDE, SODIUM LACTATE AND CALCIUM CHLORIDE 9 ML/HR: 600; 310; 30; 20 INJECTION, SOLUTION INTRAVENOUS at 08:16

## 2024-06-18 RX ADMIN — ACETAMINOPHEN 650 MG: 325 TABLET ORAL at 17:04

## 2024-06-18 RX ADMIN — PROPOFOL 25 MCG/KG/MIN: 10 INJECTION, EMULSION INTRAVENOUS at 11:39

## 2024-06-18 RX ADMIN — BUPIVACAINE HYDROCHLORIDE 60 ML: 2.5 INJECTION, SOLUTION EPIDURAL; INFILTRATION; INTRACAUDAL; PERINEURAL at 11:40

## 2024-06-18 RX ADMIN — EPHEDRINE SULFATE 5 MG: 50 INJECTION INTRAVENOUS at 12:40

## 2024-06-18 RX ADMIN — LIDOCAINE HYDROCHLORIDE 0.5 ML: 10 INJECTION, SOLUTION EPIDURAL; INFILTRATION; INTRACAUDAL; PERINEURAL at 08:02

## 2024-06-18 RX ADMIN — DEXAMETHASONE SODIUM PHOSPHATE 4 MG: 10 INJECTION, SOLUTION INTRAMUSCULAR; INTRAVENOUS at 11:40

## 2024-06-18 RX ADMIN — GLYCOPYRROLATE 0.2 MG: 0.2 INJECTION INTRAMUSCULAR; INTRAVENOUS at 14:55

## 2024-06-18 RX ADMIN — ROCURONIUM BROMIDE 100 MG: 10 INJECTION INTRAVENOUS at 11:33

## 2024-06-18 RX ADMIN — METOPROLOL TARTRATE 25 MG: 25 TABLET, FILM COATED ORAL at 21:35

## 2024-06-18 RX ADMIN — SUGAMMADEX 200 MG: 100 INJECTION, SOLUTION INTRAVENOUS at 12:59

## 2024-06-18 RX ADMIN — LIDOCAINE HYDROCHLORIDE 50 MG: 10 INJECTION, SOLUTION EPIDURAL; INFILTRATION; INTRACAUDAL; PERINEURAL at 11:33

## 2024-06-18 NOTE — ANESTHESIA POSTPROCEDURE EVALUATION
Patient: Cayden Nielsen    Procedure Summary       Date: 06/18/24 Room / Location:  [facility]    Anesthesia Start: 1130 Anesthesia Stop: 1310    Procedure: ROBOTIC PROSTATECTOMY WITH NODES (Pelvis) Diagnosis:     Surgeons: Ric Nunez Jr., MD Provider: Francesca Brand MD    Anesthesia Type: [place], general with block ASA Status: 3            Anesthesia Type: [place], general with block    Vitals  Vitals Value Taken Time   /89 06/18/24 1515   Temp 97.3 °F (36.3 °C) 06/18/24 1307   Pulse 60 06/18/24 1519   Resp 16 06/18/24 1458   SpO2 99 % 06/18/24 1519   Vitals shown include unfiled device data.        Post Anesthesia Care and Evaluation    Patient location during evaluation: PACU  Patient participation: complete - patient participated  Level of consciousness: awake and alert  Pain management: adequate    Airway patency: patent  Anesthetic complications: No anesthetic complications  PONV Status: none  Cardiovascular status: hemodynamically stable and acceptable  Respiratory status: nonlabored ventilation, acceptable and nasal cannula  Hydration status: acceptable

## 2024-06-18 NOTE — H&P
Pre-Op H&P  Cayden Nielsen  [id number]  1950      Chief complaint: Prostate cancer      Subjective:  Patient is a 74 y.o.male presents for scheduled surgery by Dr. Nunez. He anticipates a ROBOTIC PROSTATECTOMY WITH NODES  today. He underwent prostate biopsy January 26, 2024. Pathology report shows widespread positive biopsies with adenocarcinoma of the prostate Felisa score 4+3 = 7. PSA at the time of biopsy is 30.5. Bone scan March 2024 is negative. CT scan of the abdomen and pelvis February 2024 is negative genomic prostate scores 47.       Review of Systems:  Constitutional-- No fever, chills or sweats. No fatigue.  CV-- No chest pain, palpitation or syncope. +HTN, HLD  Resp-- No SOB, cough, hemoptysis  Skin--No rashes or lesions      Allergies: No Known Allergies      Home Meds:  Medications Prior to Admission   Medication Sig Dispense Refill Last Dose    aspirin 81 MG EC tablet Take 1 tablet by mouth Daily.   6/18/2024 at 0615    atorvastatin (LIPITOR) 80 MG tablet Take 1 tablet by mouth Daily.   6/17/2024    bicalutamide (Casodex) 50 MG tablet Take 1 tablet by mouth Daily.   6/18/2024 at 0615    metoprolol tartrate (LOPRESSOR) 25 MG tablet Take 1 tablet by mouth Every 12 (Twelve) Hours. 180 tablet 4 6/18/2024 at 0615         PMH:   Past Medical History:   Diagnosis Date    A-fib     Bruises easily     Coronary artery disease     Hypertension     Kidney stone     Kidney stone     Tuberculosis      PSH:    Past Surgical History:   Procedure Laterality Date    CARDIAC CATHETERIZATION N/A 11/23/2022    Procedure: Left Heart Cath;  Surgeon: John Cortez MD;  Location: [facility] CATH INVASIVE LOCATION;  Service: Cardiology;  Laterality: N/A;    CORONARY ARTERY BYPASS GRAFT N/A 11/25/2022    Procedure: MEDIAN STERNOTOMY CORONARY ARTERY BYPASS GRAFTING X5 UTILIZING THE LEFT INTERNAL MAMMERY GRAFT, EVH OF THE GREATER RIGHT SAPHENOUS VEIN AND GLENDA PER ANESTHESIA;  Surgeon: Jay Maher MD;  Location: [facility] OR;  " Service: Cardiothoracic;  Laterality: N/A;       Immunization History:  Influenza: No  Pneumococcal: No  Tetanus: UTD  Covid : x2    Social History:   Tobacco:   Social History     Tobacco Use   Smoking Status Never   Smokeless Tobacco Never      Alcohol:     Social History     Substance and Sexual Activity   Alcohol Use No         Physical Exam:/86 (BP Location: Right arm, Patient Position: Lying)   Pulse 51   Temp 97.8 °F (36.6 °C) (Temporal)   Resp 16   Ht 177.8 cm (70\")   Wt 81.6 kg (180 lb)   SpO2 99%   BMI 25.83 kg/m²       General Appearance:    Alert, cooperative, no distress, appears stated age   Head:    Normocephalic, without obvious abnormality, atraumatic   Lungs:     Clear to auscultation bilaterally, respirations unlabored    Heart:   Regular rate and rhythm, S1 and S2 normal    Abdomen:    Soft without tenderness   Extremities:   Extremities normal, atraumatic, no cyanosis or edema   Skin:   Skin color, texture, turgor normal, no rashes or lesions   Neurologic:   Grossly intact     Results Review:     LABS:  Lab Results   Component Value Date    WBC 8.10 06/12/2024    HGB 13.7 06/12/2024    HCT 42.6 06/12/2024    MCV 92.0 06/12/2024     06/12/2024    NEUTROABS 5.09 05/02/2024    GLUCOSE 90 06/12/2024    BUN 23 06/12/2024    CREATININE 1.19 06/12/2024    EGFRIFNONA 76 09/01/2021    EGFRIFAFRI 92 09/01/2021     06/12/2024    K 4.4 06/12/2024     06/12/2024    CO2 25.0 06/12/2024    MG 2.3 11/30/2022    PHOS 2.5 11/30/2022    CALCIUM 8.8 06/12/2024    ALBUMIN 4.0 05/02/2024    AST 29 05/02/2024    ALT 21 05/02/2024    BILITOT 0.5 05/02/2024      Latest Reference Range & Units 09/06/23 09:27   PSA 0.000 - 4.000 ng/mL 30.500 (H)   (H): Data is abnormally high    RADIOLOGY:  Imaging Results (Last 72 Hours)       ** No results found for the last 72 hours. **            I reviewed the patient's new clinical results.    Cancer Staging (if applicable)  Cancer Patient: __ yes " __no __unknown; If yes, clinical stage T:__ N:__M:__, stage group or __N/A      Impression: Prostate cancer      Plan: ROBOTIC PROSTATECTOMY WITH NODES       FRANCISCA Markham   6/18/2024   08:14 EDT

## 2024-06-18 NOTE — PLAN OF CARE
Goal Outcome Evaluation:  Plan of Care Reviewed With: patient        Progress: improving  Outcome Evaluation: VSS. RA. Alert and oriented x4. Drowsy. No complaints of pain or nausea. lap sites clean and dry with no drainage. Sandoval catheter in place with pink/yellow output. Encouraging ICS and ambulation. pt resting comfortably. no further complaints at this time.

## 2024-06-18 NOTE — ANESTHESIA PREPROCEDURE EVALUATION
Anesthesia Evaluation     Patient summary reviewed and Nursing notes reviewed   no history of anesthetic complications:   NPO Solid Status: > 8 hours  NPO Liquid Status: > 2 hours           Airway   Mallampati: II  TM distance: >3 FB  Neck ROM: full  Possible difficult intubation and Anterior  Comment: MAC 4 grade IIb   Dental - normal exam     Pulmonary - normal exam   (-) asthma, sleep apnea, not a smoker  Cardiovascular - normal exam  Exercise tolerance: good (4-7 METS)    ECG reviewed  Patient on routine beta blocker and Beta blocker given within 24 hours of surgery    (+) hypertension, past MI , CAD, CABG (2022) >6 Months, dysrhythmias Atrial Fib, angina, hyperlipidemia  (-) DVT    ROS comment: Cardiac clearance on chart  EF 50%  Mild MR, MIld TR,    Neuro/Psych  (-) seizures, CVA  GI/Hepatic/Renal/Endo    (+) renal disease- stones  (-) GERD, diabetes, no thyroid disorder    Musculoskeletal     Abdominal    Substance History - negative use     OB/GYN          Other      history of cancer (prostate)                  Anesthesia Plan    ASA 3     [place] and general with block     intravenous induction     Anesthetic plan, risks, benefits, and alternatives have been provided, discussed and informed consent has been obtained with: patient and spouse/significant other.    Use of blood products discussed with spouse/significant other and patient  Consented to blood products.    Plan discussed with CRNA.      CODE STATUS:

## 2024-06-18 NOTE — H&P
Admission      Patient Name: Cayden Nielsen  MRN: [id number]  : 1950  DOS: 2024    Attending: Ric Nunez Jr*    Primary Care Provider: Vannessa Tuttle DO      Patient Care Team:  Vannessa Tuttle DO as PCP - General (Family Medicine)  John Cortez MD as Consulting Physician (Cardiology)    Chief complaint:  Prostate cancer    Subjective   Patient is a pleasant 74 y.o. male presented for scheduled surgery by Dr. Mayra Larson.    He underwent prostate biopsy 2024. Pathology report shows widespread positive biopsies with adenocarcinoma of the prostate Canterbury score 4+3 = 7. PSA at the time of biopsy is 30.5. Bone scan 2024 is negative. CT scan of the abdomen and pelvis 2024 is negative genomic prostate scores 47.       Today he underwent robotic prostatectomy with lymph nodes, surgery was done under general anesthesia and a tap block, was tolerated well, is admitted for the management.    Seen in PACU postop, doing fairly well.  Drowsy, awakens briefly, tries to answer questions.    Reviewed patient's chart including his past medical history.  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  He is on a medical regimen including aspirin, statin, and a beta-blocker.    Allergies:  No Known Allergies    Meds:  Medications Prior to Admission   Medication Sig Dispense Refill Last Dose    aspirin 81 MG EC tablet Take 1 tablet by mouth Daily.   2024 at 0615    atorvastatin (LIPITOR) 80 MG tablet Take 1 tablet by mouth Daily.   2024    bicalutamide (Casodex) 50 MG tablet Take 1 tablet by mouth Daily.   2024 at 0615    metoprolol tartrate (LOPRESSOR) 25 MG tablet Take 1 tablet by mouth Every 12 (Twelve) Hours. 180 tablet 4 2024 at 0615            Past Medical History:   Diagnosis Date    A-fib     Bruises easily     Coronary artery disease     Hypertension     Kidney stone     Kidney stone     Tuberculosis      Past Surgical History:   Procedure  "Laterality Date    CARDIAC CATHETERIZATION N/A 11/23/2022    Procedure: Left Heart Cath;  Surgeon: John Cortez MD;  [facility];  Service: Cardiology;  Laterality: N/A;    CORONARY ARTERY BYPASS GRAFT N/A 11/25/2022    Procedure: MEDIAN STERNOTOMY CORONARY ARTERY BYPASS GRAFTING X5 UTILIZING THE LEFT INTERNAL MAMMERY GRAFT, EVH OF THE GREATER RIGHT SAPHENOUS VEIN AND GLENDA PER ANESTHESIA;  Surgeon: Jay Maher MD;  [facility];  Service: Cardiothoracic;  Laterality: N/A;     Family History   Problem Relation Age of Onset    Stroke Mother     Lung cancer Father      Social History     Tobacco Use    Smoking status: Never    Smokeless tobacco: Never   Vaping Use    Vaping status: Never Used   Substance Use Topics    Alcohol use: No    Drug use: No       Review of Systems  Review of systems could not be obtained due to   patient sedation status.    Vital Signs  /85 (BP Location: Right arm, Patient Position: Lying)   Pulse 55   Temp 96.5 °F (35.8 °C) (Axillary)   Resp 16   Ht 177.8 cm (70\")   Wt 81.6 kg (180 lb)   SpO2 98%   BMI 25.83 kg/m²     Physical Exam:    General Appearance:  Drowsy postop, cooperative, in no acute distress   Head:    Normocephalic, without obvious abnormality, atraumatic   Eyes:            Lids and lashes normal, conjunctivae and sclerae normal, no   icterus, no pallor, corneas clear    Ears:    Ears appear intact with no abnormalities noted   Throat:   No oral lesions, no thrush, oral mucosa moist   Neck:   No adenopathy, supple, trachea midline, no thyromegaly         Lungs:     Clear to auscultation,respirations regular, even and      unlabored, no wheezes or rales.    Heart:    Regular rhythm and normal rate, normal S1 and S2, no   murmur, no gallop    Abdomen:    Soft, incisions intact, benign exam, ROGER drain present.   Genitalia:    Deferred  Sandoval with pink urine   Extremities: No edema, no cyanosis, no      redness   Pulses:   Pulses " palpable and equal bilaterally   Skin:   No bleeding, bruising or rash          Results from last 7 days   Lab Units 06/12/24  1534   WBC 10*3/mm3 8.10   HEMOGLOBIN g/dL 13.7   HEMATOCRIT % 42.6   PLATELETS 10*3/mm3 184     Results from last 7 days   Lab Units 06/12/24  1534   SODIUM mmol/L 138   POTASSIUM mmol/L 4.4   CHLORIDE mmol/L 105   CO2 mmol/L 25.0   BUN mg/dL 23   CREATININE mg/dL 1.19   CALCIUM mg/dL 8.8   GLUCOSE mg/dL 90     Lab Results   Component Value Date    HGBA1C 5.60 03/06/2023       Assessment and Plan:       S/P prostatectomy, robotic with lymph nodes.    Hyperlipidemia LDL goal <70    Essential hypertension    Prostate cancer    CAD (coronary artery disease), s/p CABG 2022      Plan:    Admitted for further management.      1. Ambulation, encouraged , starting today  2. Pain control-prns   3. IS-encourage  4. DVT proph- Mechanicals and Lovenox SQ to start POD1.  5. Bowel regimen  6. Resume home medications as appropriate.  7. Monitor post-op labs and path.  8. Clear liquid diet, advance as tolerated with return of bowel function  9.Monitor output from ROGER drain, aim to remove prior to hospital discharge.  10.Discharge planning.    - Hypertension:  Resume home medications as appropriate, formulary substitution when indicated.  Holding parameters.  Prn medications for elevated blood pressure.    -Coronary artery disease:  Resume beta-blocker and statin.  Will resume aspirin when okay with surgery.      Dragon disclaimer:  Part of this encounter note is an electronic transcription/translation of spoken language to printed text. The electronic translation of spoken language may permit erroneous, or at times, nonsensical words or phrases to be inadvertently transcribed; Although I have reviewed the note for such errors, some may still exist.      Hiram Zuniga MD  06/18/24  17:15 EDT

## 2024-06-18 NOTE — OP NOTE
PROSTATECTOMY LAPAROSCOPIC WITH DAVINCI ROBOT  Procedure Note    Cayden Nielsen  6/18/2024    Pre-op Diagnosis:   Prostate cancer    Post-op Diagnosis:     Prostate cancer    Procedure/CPT® Codes:      Procedure(s):  ROBOTIC PROSTATECTOMY WITH NODES    Surgeon(s):  Ric Nunez Jr., MD    Anesthesia: General with Block    Staff:   Circulator: Deloris Sesay, MARIBEL; Nathalie Dumont RN  Scrub Person: Judith Feng; Eva Garza; Kindra Hopkins  Assistant: Cary Hubbard PA    Assistant: Cary Hubbard PA Was needed to assist in this case with retraction, exposure, instrument placement.    Estimated Blood Loss: <500ml  Urine Voided: * No values recorded between 6/18/2024 11:28 AM and 6/18/2024  1:00 PM *    Specimens:                Specimens       ID Source Type Tests Collected By Collected At Frozen?    A Pelvis Lymph Node TISSUE PATHOLOGY EXAM   Ric Nunez Jr., MD 6/18/24 1253 No    Description: RIGHT OBTURATOR    This specimen was not marked as sent.    B Pelvis Lymph Node TISSUE PATHOLOGY EXAM   Ric Nunez Jr., MD 6/18/24 1253 No    Description: LEFT OBTURATOR    This specimen was not marked as sent.    C Prostate Tissue TISSUE PATHOLOGY EXAM   Ric Nunez Jr., MD 6/18/24 1253 No    Description: prostate    This specimen was not marked as sent.              Drains:   Urethral Catheter Silicone 18 Fr. (Active)       Findings: No sign of extraprostatic extension grossly    Complications: None    History: 74-year-old gentleman with high-grade prostate cancer who opts for surgical therapy.  He understands risk benefits and alternatives and wishes to proceed with robotic assisted laparoscopic radical prostatectomy with lymphadenectomy.    Operative Report: After consent is obtained patient is brought to the operating suite was placed in supine position.  Anesthesia was induced.  He sterilely prepped and draped in the normal fashion.  He was carefully placed in dorsolithotomy  position padding all pressure points.  Veress needle technique was used at the umbilicus.  Pneumoperitoneum.  8 mm blunt Visiport was used also at the umbilicus to create a camera trocar site.  The remainder the robotic and assistant trocars were placed under direct vision.  Patient was placed in steep Trendelenburg position the robot was docked.  We began the operation by entering the space of Retzius and dissecting bladder posteriorly.  Endopelvic fascia was opened bilaterally and puboprostatic ligaments were divided.  Ligature of 0 Vicryl sutures placed around the dorsal venous complex in figure-of-eight fashion.  Anterior bladder neck is identified and staying clear the base the prostate we open the anterior bladder neck.  Posterior bladder neck dissection is carried out the posterior bladder neck is divided.  Seminal vesicles and vas deferens were identified dissected out in their entirety using minimal thermal energy.  We then dissected the space posterior the prostate anterior to the rectum up to the level apex of the prostate.  Prostatic pedicles were isolated and taken down using a vessel sealer device.  We did perform a partial nerve sparing but because of the high-grade nature of disease we did not dissect directly onto the prostatic capsule.  This was enhanced by using amniotic allograft.  Anterior dissection was performed by dividing dorsal venous complex.  Apical dissection performed leaving a nice stump of urethra for anastomosis.  Urethra was divided and the specimen was placed in the specimen sac.  Bilateral obturator lymph node dissection was carried out using a vessel sealer device for hemostasis.  She does track it posterior tailoring of the bladder neck is performed using a running 2-0 Vicryl suture.  Running 2-0 Monocryl vesicourethral anastomosis is performed which is watertight.  A drain is brought into the right most lateral trocar site and placed in the pelvis.  Robot is undocked and under  laparoscopic guidance we removed all of our trocars.  We enlarged the fascial defect at the umbilicus and delivered our specimen within the specimen sac.  This fascial defect was closed using a 0 PDS suture.  All incisions were irrigated.  Subcutaneous tissues brought together with 3-0 Vicryl.  Skin was brought together using Dermabond.  ROGER drain and Sandoval catheter secured in place.  Anesthesia reversed.  Patient was taken to the recovery room in stable condition.    Ric Nunze Jr., MD     Date: 6/18/2024  Time: 13:04 EDT

## 2024-06-18 NOTE — ANESTHESIA PROCEDURE NOTES
Peripheral Block    Pre-sedation assessment completed: 6/18/2024 11:36 AM    Patient reassessed immediately prior to procedure    Patient location during procedure: OR  Start time: 6/18/2024 11:36 AM  Stop time: 6/18/2024 11:40 AM  Reason for block: at surgeon's request and post-op pain management  Performed by  CRNA/CAA: Raman Cruz, ASHLYN  Preanesthetic Checklist  Completed: patient identified, IV checked, site marked, risks and benefits discussed, surgical consent, monitors and equipment checked, pre-op evaluation and timeout performed  Prep:  Pt Position: supine  Sterile barriers:cap, gloves, mask and washed/disinfected hands  Prep: ChloraPrep  Patient monitoring: blood pressure monitoring, continuous pulse oximetry and EKG  Procedure    Sedation: yes  Performed under: general  Guidance:ultrasound guided  Images:still images obtained, printed/placed on chart    Laterality:Bilateral  Block Type:TAP  Injection Technique:single-shot  Needle Type:short-bevel and echogenic  Needle Gauge:20 G  Resistance on Injection: none    Medications Used: dexamethasone sodium phosphate injection - Injection   4 mg - 6/18/2024 11:40:00 AM  bupivacaine PF (MARCAINE) 0.25 % injection - Injection   60 mL - 6/18/2024 11:40:00 AM      Medications  Preservative Free Saline:10ml  Comment:Block Injection:  LA dose divided between Right and Left block        Post Assessment  Injection Assessment: negative aspiration for heme, incremental injection and no paresthesia on injection  Patient Tolerance:comfortable throughout block  Complications:no  Additional Notes    Subcostal TAPs    A high-frequency linear transducer, with sterile cover, was placed sub-xiphoid to identify Linea Alba, right and left Rectus Abdominus Muscles (MARQUES). The transducer was moved either right or left subcostally to identify the MARQUES and the Transverse Abdominus Muscle (YU). The insertion site was prepped in sterile fashion and then localized with 2-5 ml of 1%  "Lidocaine. Using ultrasound-guidance, a 20-gauge B-Whitfield 4\" Ultraplex 360 non-stimulating echogenic needle was advanced in plane, from medial to lateral, until the tip of the needle was in the fascial plane between the MARQUES and YU. 1-3ml of preservative free normal saline was used to hydro-dissect the fascial planes. After the fascial plane was verified, the local anesthetic (LA) was injected. The procedure was repeated on the opposite side for bilateral coverage. Aspiration every 5 ml to prevent intravascular injection. Injection was completed with negative aspiration of blood and negative intravascular injection. Injection pressures were normal with minimal resistance. The subcostal approach to the TAP nerve block ideally anesthetizes the intercostal nerves T6-T9.     Mid-Axillary/Lateral TAPs    A high-frequency linear transducer, with sterile cover, was placed in the midaxillary line between the ASIS and costal margin. The External Oblique Muscle (EOM), Internal Oblique Muscle (IOM), Transverse Abdominus Muscle (YU), and Peritoneum were identified. The insertion site was prepped in sterile fashion and then localized with 2-5 ml of 1% Lidocaine. Using ultrasound-guidance, a 20-gauge B-Whitfield 4\" Ultraplex 360 non-stimulating echogenic needle was advanced in plane, from medial to lateral, until the tip of the needle was in the fascial plane between the IOM and YU. 1-3ml of preservative free normal saline was used to hydro-dissect the fascial planes. After the fascial plane was verified, the local anesthetic (LA) was injected. The procedure was repeated on the opposite side for bilateral coverage. Aspiration every 5 ml to prevent intravascular injection. Injection was completed with negative aspiration of blood and negative intravascular injection. Injection pressures were normal with minimal resistance. Midaxillary TAPs should reach intercostal nerves T10- T11 and the subcostal nerve T12.    Performed by: " Nitza Johnson, CRNA

## 2024-06-19 LAB
ANION GAP SERPL CALCULATED.3IONS-SCNC: 12 MMOL/L (ref 5–15)
BUN SERPL-MCNC: 16 MG/DL (ref 8–23)
BUN/CREAT SERPL: 15.5 (ref 7–25)
CALCIUM SPEC-SCNC: 8.6 MG/DL (ref 8.6–10.5)
CHLORIDE SERPL-SCNC: 104 MMOL/L (ref 98–107)
CO2 SERPL-SCNC: 21 MMOL/L (ref 22–29)
CREAT SERPL-MCNC: 1.03 MG/DL (ref 0.76–1.27)
DEPRECATED RDW RBC AUTO: 45.7 FL (ref 37–54)
EGFRCR SERPLBLD CKD-EPI 2021: 76.2 ML/MIN/1.73
ERYTHROCYTE [DISTWIDTH] IN BLOOD BY AUTOMATED COUNT: 14 % (ref 12.3–15.4)
GLUCOSE SERPL-MCNC: 146 MG/DL (ref 65–99)
HCT VFR BLD AUTO: 42.4 % (ref 37.5–51)
HGB BLD-MCNC: 13.8 G/DL (ref 13–17.7)
MCH RBC QN AUTO: 29.1 PG (ref 26.6–33)
MCHC RBC AUTO-ENTMCNC: 32.5 G/DL (ref 31.5–35.7)
MCV RBC AUTO: 89.3 FL (ref 79–97)
PLATELET # BLD AUTO: 191 10*3/MM3 (ref 140–450)
PMV BLD AUTO: 11 FL (ref 6–12)
POTASSIUM SERPL-SCNC: 4.8 MMOL/L (ref 3.5–5.2)
RBC # BLD AUTO: 4.75 10*6/MM3 (ref 4.14–5.8)
SODIUM SERPL-SCNC: 137 MMOL/L (ref 136–145)
WBC NRBC COR # BLD AUTO: 16.03 10*3/MM3 (ref 3.4–10.8)

## 2024-06-19 PROCEDURE — 63710000001 ATORVASTATIN 40 MG TABLET: Performed by: UROLOGY

## 2024-06-19 PROCEDURE — A9270 NON-COVERED ITEM OR SERVICE: HCPCS | Performed by: UROLOGY

## 2024-06-19 PROCEDURE — 63710000001 METOPROLOL TARTRATE 25 MG TABLET: Performed by: UROLOGY

## 2024-06-19 PROCEDURE — 25010000002 ENOXAPARIN PER 10 MG: Performed by: UROLOGY

## 2024-06-19 PROCEDURE — 63710000001 PANTOPRAZOLE 40 MG TABLET DELAYED-RELEASE: Performed by: UROLOGY

## 2024-06-19 PROCEDURE — 80048 BASIC METABOLIC PNL TOTAL CA: CPT | Performed by: UROLOGY

## 2024-06-19 PROCEDURE — 63710000001 BICALUTAMIDE 50 MG TABLET: Performed by: UROLOGY

## 2024-06-19 PROCEDURE — 63710000001 ACETAMINOPHEN 325 MG TABLET: Performed by: UROLOGY

## 2024-06-19 PROCEDURE — 25010000002 CEFOXITIN PER 1 G: Performed by: UROLOGY

## 2024-06-19 PROCEDURE — 25010000002 SODIUM CHLORIDE 0.9 % WITH KCL 20 MEQ 20-0.9 MEQ/L-% SOLUTION: Performed by: UROLOGY

## 2024-06-19 PROCEDURE — 63710000001 GABAPENTIN 100 MG CAPSULE: Performed by: UROLOGY

## 2024-06-19 PROCEDURE — 85027 COMPLETE CBC AUTOMATED: CPT | Performed by: UROLOGY

## 2024-06-19 RX ADMIN — CEFOXITIN SODIUM 1000 MG: 1 POWDER, FOR SOLUTION INTRAVENOUS at 03:29

## 2024-06-19 RX ADMIN — ACETAMINOPHEN 650 MG: 325 TABLET ORAL at 23:37

## 2024-06-19 RX ADMIN — POTASSIUM CHLORIDE AND SODIUM CHLORIDE 100 ML/HR: 900; 150 INJECTION, SOLUTION INTRAVENOUS at 16:21

## 2024-06-19 RX ADMIN — METOPROLOL TARTRATE 25 MG: 25 TABLET, FILM COATED ORAL at 09:27

## 2024-06-19 RX ADMIN — POTASSIUM CHLORIDE AND SODIUM CHLORIDE 100 ML/HR: 900; 150 INJECTION, SOLUTION INTRAVENOUS at 23:38

## 2024-06-19 RX ADMIN — METOPROLOL TARTRATE 25 MG: 25 TABLET, FILM COATED ORAL at 20:44

## 2024-06-19 RX ADMIN — GABAPENTIN 100 MG: 100 CAPSULE ORAL at 20:44

## 2024-06-19 RX ADMIN — POTASSIUM CHLORIDE AND SODIUM CHLORIDE 100 ML/HR: 900; 150 INJECTION, SOLUTION INTRAVENOUS at 03:29

## 2024-06-19 RX ADMIN — GABAPENTIN 100 MG: 100 CAPSULE ORAL at 09:27

## 2024-06-19 RX ADMIN — GABAPENTIN 100 MG: 100 CAPSULE ORAL at 16:21

## 2024-06-19 RX ADMIN — CEFOXITIN SODIUM 1000 MG: 1 POWDER, FOR SOLUTION INTRAVENOUS at 11:07

## 2024-06-19 RX ADMIN — ACETAMINOPHEN 650 MG: 325 TABLET ORAL at 05:21

## 2024-06-19 RX ADMIN — PANTOPRAZOLE SODIUM 40 MG: 40 TABLET, DELAYED RELEASE ORAL at 05:21

## 2024-06-19 RX ADMIN — ENOXAPARIN SODIUM 40 MG: 100 INJECTION SUBCUTANEOUS at 09:27

## 2024-06-19 RX ADMIN — BICALUTAMIDE 50 MG: 50 TABLET, FILM COATED ORAL at 09:27

## 2024-06-19 RX ADMIN — ACETAMINOPHEN 650 MG: 325 TABLET ORAL at 16:21

## 2024-06-19 RX ADMIN — ATORVASTATIN CALCIUM 80 MG: 40 TABLET, FILM COATED ORAL at 09:27

## 2024-06-19 RX ADMIN — ACETAMINOPHEN 650 MG: 325 TABLET ORAL at 11:07

## 2024-06-19 NOTE — PROGRESS NOTES
Patient feels well this morning  No flatus  No nausea or vomiting    Abdomen is benign  Incisions look good  Urine is yellow clear in catheter tubing    Appropriate ROGER drain output      Wait for bowel function before advancing diet  Home once demonstrating bowel function with flatus  Home with Sandoval catheter  Remove ROGER drain prior to discharge

## 2024-06-19 NOTE — ANESTHESIA POSTPROCEDURE EVALUATION
Patient: Cayden Nielsen    Procedure Summary       Date: 06/18/24 Room / Location:  [facility]    Anesthesia Start: 1130 Anesthesia Stop: 1310    Procedure: ROBOTIC PROSTATECTOMY WITH NODES (Pelvis) Diagnosis:     Surgeons: Ric Nunez Jr., MD Provider: Francesca Brand MD    Anesthesia Type: Shattuck, general with block ASA Status: 3            Anesthesia Type: Megan, general with block    Vitals  Vitals Value Taken Time   /89 06/18/24 1515   Temp 97.3 °F (36.3 °C) 06/18/24 1307   Pulse 60 06/18/24 1519   Resp 16 06/18/24 1458   SpO2 99 % 06/18/24 1519   Vitals shown include unfiled device data.        Post Anesthesia Care and Evaluation    Patient location during evaluation: PACU  Patient participation: complete - patient participated  Level of consciousness: awake and alert  Pain management: adequate    Airway patency: patent  Anesthetic complications: No anesthetic complications  PONV Status: none  Cardiovascular status: hemodynamically stable and acceptable  Respiratory status: nonlabored ventilation, acceptable and nasal cannula  Hydration status: acceptable

## 2024-06-19 NOTE — CASE MANAGEMENT/SOCIAL WORK
Discharge Planning Assessment  [facility]     Patient Name: Cayden Nielsen  MRN: [id number]  Today's Date: 6/19/2024    Admit Date: 6/18/2024    Plan: home   Discharge Needs Assessment       Row Name 06/19/24 0917       Living Environment    People in Home spouse    Current Living Arrangements home    Primary Care Provided by self       Transition Planning    Patient/Family Anticipates Transition to home with family       Discharge Needs Assessment    Readmission Within the Last 30 Days no previous admission in last 30 days    Equipment Currently Used at Home none    Concerns to be Addressed basic needs;discharge planning                   Discharge Plan       Row Name 06/19/24 0917       Plan    Plan home    Patient/Family in Agreement with Plan yes    Plan Comments I met with this patient bedside. He lives with his wife in [facility]. He is independent with activities of daily living and mobility. He anticipates returning home after this hospitalization, and his wife or daughter can transport. Case management will follow.    Final Discharge Disposition Code 01 - home or self-care                  Continued Care and Services - Admitted Since 6/18/2024    No active coordination exists for this encounter.       Expected Discharge Date and Time       Expected Discharge Date Expected Discharge Time    Jun 21, 2024            Demographic Summary       Row Name 06/19/24 0916       General Information    General Information Comments I confirmed that Vannessa Tuttle is Mr Nielsen's PCP and he has [employer] Medicare                   Functional Status       Row Name 06/19/24 0917       Functional Status, IADL    Medications independent    Meal Preparation independent    Housekeeping independent    Laundry independent    Shopping independent                   Psychosocial    No documentation.                  Abuse/Neglect    No documentation.                  Legal    No documentation.                  Substance Abuse    No  documentation.                  Patient Forms    No documentation.                     Virginia Thao RN

## 2024-06-19 NOTE — PLAN OF CARE
Goal Outcome Evaluation:  Plan of Care Reviewed With: patient   - VSS, RA, A&Ox4  - IV abx administered  - No complaints of pain or nausea  - Pt resting comfortably     Progress: improving

## 2024-06-19 NOTE — CASE MANAGEMENT/SOCIAL WORK
Continued Stay Note  [facility]     Patient Name: Cayden Nielsen  MRN: [id number]  Today's Date: 6/19/2024    Admit Date: 6/18/2024    Plan: home   Discharge Plan       Row Name 06/19/24 1204       Plan    Plan home    Patient/Family in Agreement with Plan yes    Plan Comments I met with this patient regarding his discharge home today. He is in agreement. His family can transport. He denies having any further discharge planning needs.    Final Discharge Disposition Code 01 - home or self-care      Row Name 06/19/24 0917       Plan    Plan home    Patient/Family in Agreement with Plan yes    Plan Comments I met with this patient bedside. He lives with his wife in [facility]. He is independent with activities of daily living and mobility. He anticipates returning home after this hospitalization, and his wife or daughter can transport. Case management will follow.    Final Discharge Disposition Code 01 - home or self-care                   Discharge Codes    No documentation.                 Expected Discharge Date and Time       Expected Discharge Date Expected Discharge Time    Jun 19, 2024               Virginia Thao RN

## 2024-06-19 NOTE — PROGRESS NOTES
" progress note      Cayden Nielsen  [id number]  1950     LOS: 0 days     Attending: Ric Nunez Jr*    Primary Care Provider: Vannessa Tuttle DO      Chief Complaint/Reason for visit:  Abd pain     Subjective   Doing well. Good pain control. Tolerating clears. Ambulating. No flatus or BM. Denies f/c/n/v/sob/cp.    Objective     Vital Signs    Visit Vitals  /71 (BP Location: Right arm, Patient Position: Sitting)   Pulse 52   Temp 97.6 °F (36.4 °C) (Oral)   Resp 16   Ht 177.8 cm (70\")   Wt 81.6 kg (180 lb)   SpO2 95%   BMI 25.83 kg/m²     Temp (24hrs), Av.5 °F (36.4 °C), Min:97.3 °F (36.3 °C), Max:97.6 °F (36.4 °C)      Nutrition: PO    Respiratory: RA    Physical Exam:     General Appearance:    Alert, cooperative, in no acute distress   Head:    Normocephalic, without obvious abnormality, atraumatic    Lungs:     Normal effort, symmetric chest rise, no crepitus, clear to      auscultation bilaterally             Heart:    Regular rhythm and normal rate, normal S1 and S2   Abdomen:     Soft, incisions intact, benign exam, ROGER drain present.   : camacho - yellow UOP   Extremities:   No clubbing, cyanosis or edema.  No deformities.    Pulses:   Pulses palpable and equal bilaterally   Skin:   No bleeding, bruising or rash   Neurologic:   Moves all extremities with no obvious focal motor deficit.  Cranial nerves 2 - 12 grossly intact     Results Review:     I reviewed the patient's new clinical results.   Results from last 7 days   Lab Units 24  0626   WBC 10*3/mm3 16.03*   HEMOGLOBIN g/dL 13.8   HEMATOCRIT % 42.4   PLATELETS 10*3/mm3 191     Results from last 7 days   Lab Units 24  0626   SODIUM mmol/L 137   POTASSIUM mmol/L 4.8   CHLORIDE mmol/L 104   CO2 mmol/L 21.0*   BUN mg/dL 16   CREATININE mg/dL 1.03   CALCIUM mg/dL 8.6   GLUCOSE mg/dL 146*     I reviewed the patient's new imaging including images and reports.    All medications reviewed.   acetaminophen, 650 mg, Oral, " Q6H   Or  acetaminophen, 650 mg, Oral, Q6H   Or  acetaminophen, 650 mg, Rectal, Q6H  atorvastatin, 80 mg, Oral, Daily  bicalutamide, 50 mg, Oral, Daily  enoxaparin, 40 mg, Subcutaneous, Daily  gabapentin, 100 mg, Oral, TID  metoprolol tartrate, 25 mg, Oral, Q12H  pantoprazole, 40 mg, Oral, Q AM        Assessment & Plan     S/P prostatectomy, robotic with lymph nodes.    Hyperlipidemia LDL goal <70    Essential hypertension    Prostate cancer    CAD (coronary artery disease), s/p CABG 2022      Plan  1. Ambulation  2. Pain control-prns   3. IS-encouraged  4. DVT proph- mechs/Lovenox  5. Bowel regimen  6. Clear diet. Continue IVF   7. Monitor post-op labs  8. DC planning for home     - Hypertension:  Resume home medications as appropriate, formulary substitution when indicated.  Holding parameters.  Prn medications for elevated blood pressure.     -Coronary artery disease:  Resume beta-blocker and statin.  Will resume aspirin when okay with surgery.      Bailey Bass, FRANCISCA  06/19/24  16:33 EDT

## 2024-06-20 VITALS
SYSTOLIC BLOOD PRESSURE: 152 MMHG | BODY MASS INDEX: 25.77 KG/M2 | DIASTOLIC BLOOD PRESSURE: 70 MMHG | RESPIRATION RATE: 16 BRPM | HEART RATE: 49 BPM | WEIGHT: 180 LBS | HEIGHT: 70 IN | OXYGEN SATURATION: 96 % | TEMPERATURE: 97.6 F

## 2024-06-20 LAB
CYTO UR: NORMAL
LAB AP CASE REPORT: NORMAL
LAB AP CLINICAL INFORMATION: NORMAL
PATH REPORT.FINAL DX SPEC: NORMAL
PATH REPORT.GROSS SPEC: NORMAL

## 2024-06-20 PROCEDURE — A9270 NON-COVERED ITEM OR SERVICE: HCPCS | Performed by: UROLOGY

## 2024-06-20 PROCEDURE — 63710000001 ATORVASTATIN 40 MG TABLET: Performed by: UROLOGY

## 2024-06-20 PROCEDURE — 63710000001 ACETAMINOPHEN 325 MG TABLET: Performed by: UROLOGY

## 2024-06-20 PROCEDURE — 63710000001 GABAPENTIN 100 MG CAPSULE: Performed by: UROLOGY

## 2024-06-20 PROCEDURE — 63710000001 BICALUTAMIDE 50 MG TABLET: Performed by: UROLOGY

## 2024-06-20 PROCEDURE — 63710000001 PANTOPRAZOLE 40 MG TABLET DELAYED-RELEASE: Performed by: UROLOGY

## 2024-06-20 PROCEDURE — 25010000002 ENOXAPARIN PER 10 MG: Performed by: UROLOGY

## 2024-06-20 PROCEDURE — 63710000001 METOPROLOL TARTRATE 25 MG TABLET: Performed by: UROLOGY

## 2024-06-20 RX ADMIN — PANTOPRAZOLE SODIUM 40 MG: 40 TABLET, DELAYED RELEASE ORAL at 05:08

## 2024-06-20 RX ADMIN — GABAPENTIN 100 MG: 100 CAPSULE ORAL at 09:32

## 2024-06-20 RX ADMIN — ATORVASTATIN CALCIUM 80 MG: 40 TABLET, FILM COATED ORAL at 09:32

## 2024-06-20 RX ADMIN — ACETAMINOPHEN 650 MG: 325 TABLET ORAL at 05:08

## 2024-06-20 RX ADMIN — ENOXAPARIN SODIUM 40 MG: 100 INJECTION SUBCUTANEOUS at 09:32

## 2024-06-20 RX ADMIN — BICALUTAMIDE 50 MG: 50 TABLET, FILM COATED ORAL at 09:36

## 2024-06-20 RX ADMIN — METOPROLOL TARTRATE 25 MG: 25 TABLET, FILM COATED ORAL at 09:32

## 2024-06-20 RX ADMIN — ACETAMINOPHEN 650 MG: 325 TABLET ORAL at 09:36

## 2024-06-20 NOTE — PROGRESS NOTES
Patient feels well this morning  Reports flatus  No nausea or vomiting    Abdomen benign  Incisions look good  Urine is yellow clear    Appropriate ROGER drain output      Home later today with catheter  Remove ROGER drain prior to discharge  My office will arrange follow-up

## 2024-06-20 NOTE — CASE MANAGEMENT/SOCIAL WORK
Continued Stay Note   [facility]     Patient Name: Cayden Nielsen  MRN: [id number]  Today's Date: 6/20/2024    Admit Date: 6/18/2024    Plan: home   Discharge Plan       Row Name 06/20/24 1420       Plan    Plan home    Patient/Family in Agreement with Plan yes    Plan Comments I met with this patient regarding his discharge home today. He is in agreement. His family can transport. He denies having any further discharge planning needs at this time.    Final Discharge Disposition Code 01 - home or self-care                   Discharge Codes    No documentation.                 Expected Discharge Date and Time       Expected Discharge Date Expected Discharge Time    Jun 20, 2024               Virginia Thao RN

## 2024-06-20 NOTE — PLAN OF CARE
Problem: Adult Inpatient Plan of Care  Goal: Plan of Care Review  Outcome: Ongoing, Progressing  Goal: Patient-Specific Goal (Individualized)  Outcome: Ongoing, Progressing  Goal: Absence of Hospital-Acquired Illness or Injury  Outcome: Ongoing, Progressing  Intervention: Identify and Manage Fall Risk  Description: Perform standard risk assessment on admission using a validated tool or comprehensive approach appropriate to the patient; reassess fall risk frequently, with change in status or transfer to another level of care.  Communicate fall injury risk to interprofessional healthcare team.  Determine need for increased observation, equipment and environmental modification, such as low bed, signage and supportive, nonskid footwear.  Adjust safety measures to individual developmental age, stage and identified risk factors.  Reinforce the importance of safety and physical activity with patient and family.  Perform regular intentional rounding to assess need for position change, pain assessment and personal needs, including assistance with toileting.  Recent Flowsheet Documentation  Taken 6/20/2024 0200 by Rustam Vo RN  Safety Promotion/Fall Prevention:   activity supervised   assistive device/personal items within reach   clutter free environment maintained   fall prevention program maintained   nonskid shoes/slippers when out of bed  Taken 6/20/2024 0000 by Rustam Vo, RN  Safety Promotion/Fall Prevention:   activity supervised   assistive device/personal items within reach   clutter free environment maintained   fall prevention program maintained   nonskid shoes/slippers when out of bed  Taken 6/19/2024 2200 by Rustam Vo, RN  Safety Promotion/Fall Prevention:   activity supervised   assistive device/personal items within reach   clutter free environment maintained   fall prevention program maintained   nonskid shoes/slippers when out of bed  Taken 6/19/2024 2000 by Rustam Vo, MARIBEL  Safety  Promotion/Fall Prevention:   activity supervised   assistive device/personal items within reach   clutter free environment maintained   fall prevention program maintained   nonskid shoes/slippers when out of bed   safety round/check completed  Intervention: Prevent Skin Injury  Description: Perform a screening for skin injury risk, such as pressure or moisture associated skin damage on admission and at regular intervals throughout hospital stay.  Keep all areas of skin (especially folds) clean and dry.  Maintain adequate skin hydration.  Relieve and redistribute pressure and protect bony prominences; implement measures based on patient-specific risk factors.  Match turning and repositioning schedule to clinical condition.  Encourage weight shift frequently; assist with reposition if unable to complete independently.  Float heels off bed; avoid pressure on the Achilles tendon.  Keep skin free from extended contact with medical devices.  Encourage functional activity and mobility, as early as tolerated.  Use aids (e.g., slide boards, mechanical lift) during transfer.  Recent Flowsheet Documentation  Taken 6/20/2024 0200 by Rustam Vo RN  Body Position: position changed independently  Taken 6/20/2024 0000 by Rustam Vo RN  Body Position: position changed independently  Taken 6/19/2024 2200 by Rustam Vo RN  Body Position: position changed independently  Taken 6/19/2024 2000 by Rustam Vo RN  Body Position: position changed independently  Intervention: Prevent and Manage VTE (Venous Thromboembolism) Risk  Description: Assess for VTE (venous thromboembolism) risk.  Encourage and assist with early ambulation.  Initiate and maintain compression or other therapy, as indicated, based on identified risk in accordance with organizational protocol and provider order.  Encourage both active and passive leg exercises while in bed, if unable to ambulate.  Recent Flowsheet Documentation  Taken 6/20/2024 0200  by Vo, Michka, RN  Activity Management: activity encouraged  Taken 6/20/2024 0000 by Rustam Vo RN  Activity Management: activity encouraged  Taken 6/19/2024 2200 by Rustam Vo RN  Activity Management: activity encouraged  Taken 6/19/2024 2000 by Rustam Vo RN  Activity Management: activity encouraged  Intervention: Prevent Infection  Description: Maintain skin and mucous membrane integrity; promote hand, oral and pulmonary hygiene.  Optimize fluid balance, nutrition, sleep and glycemic control to maximize infection resistance.  Identify potential sources of infection early to prevent or mitigate progression of infection (e.g., wound, lines, devices).  Evaluate ongoing need for invasive devices; remove promptly when no longer indicated.  Recent Flowsheet Documentation  Taken 6/20/2024 0200 by Rustam Vo RN  Infection Prevention:   environmental surveillance performed   hand hygiene promoted   rest/sleep promoted   single patient room provided  Taken 6/20/2024 0000 by Rustam Vo RN  Infection Prevention:   environmental surveillance performed   hand hygiene promoted   rest/sleep promoted   single patient room provided  Taken 6/19/2024 2200 by Rustam Vo RN  Infection Prevention:   environmental surveillance performed   hand hygiene promoted   rest/sleep promoted   single patient room provided  Taken 6/19/2024 2000 by Rustam Vo RN  Infection Prevention:   environmental surveillance performed   hand hygiene promoted   rest/sleep promoted   single patient room provided  Goal: Optimal Comfort and Wellbeing  Outcome: Ongoing, Progressing  Intervention: Provide Person-Centered Care  Description: Use a family-focused approach to care.  Develop trust and rapport by proactively providing information, encouraging questions, addressing concerns and offering reassurance.  Acknowledge emotional response to hospitalization.  Recognize and utilize personal coping strategies.  Honor  spiritual and cultural preferences.  Recent Flowsheet Documentation  Taken 6/19/2024 2000 by Rustam Vo RN  Trust Relationship/Rapport:   care explained   choices provided   questions answered   questions encouraged  Goal: Readiness for Transition of Care  Outcome: Ongoing, Progressing     Problem: Pain Acute  Goal: Acceptable Pain Control and Functional Ability  Outcome: Ongoing, Progressing  Intervention: Prevent or Manage Pain  Description: Evaluate pain level, effect of treatment and patient response at regular intervals.  Minimize painful stimuli; coordinate care and adjust environment (e.g., light, noise, unnecessary movement); promote sleep/rest.  Match pharmacologic analgesia to severity and type of pain mechanism (e.g., neuropathic, muscle, inflammatory); consider multimodal approach (e.g., nonopioid, opioid, adjuvant).  Provide medication at regular intervals; titrate to patient response; premedicate for painful procedures.  Manage breakthrough pain with additional doses; consider rotation or switching medication.  Monitor for signs of substance tolerance (increased dose to reach desired effect, decreased effect with same dose).  Manage medication-induced effects, such as constipation, nausea, pruritus, urinary retention, somnolence and dizziness.  Provide multimodal interventions, such as as physical activity, therapeutic exercise, yoga, TENS (transcutaneous electrical nerve stimulation) and manual therapy.  Train in functional activity modifications, such as body mechanics, posture, ergonomics, energy conservation and activity pacing.  Consider addition of complementary or alternative therapy, such as acupuncture, hypnosis or therapeutic touch.  Recent Flowsheet Documentation  Taken 6/20/2024 0200 by Rustam Vo, RN  Medication Review/Management: medications reviewed  Taken 6/20/2024 0000 by Rustam Vo RN  Medication Review/Management: medications reviewed  Taken 6/19/2024 2200 by  Rustam Vo, RN  Medication Review/Management: medications reviewed  Taken 6/19/2024 2000 by Rustam Vo RN  Medication Review/Management: medications reviewed  Intervention: Optimize Psychosocial Wellbeing  Description: Facilitate patient’s self-control over pain by providing pain information and allowing choices in treatment.  Consider and address emotional response to pain.  Explore and promote use of coping strategies; address barriers to successful coping.  Evaluate and assist with psychosocial, cultural and spiritual factors impacting pain.  Modify pain perception using techniques, such as distraction, mindfulness, guided imagery, meditation or music.  Assess for risk factors for developing chronic pain, such as depression, fear, pain avoidance and pain catastrophizing.  Consider referral for ongoing coping support, such as education, relaxation training and role of thoughts.  Recent Flowsheet Documentation  Taken 6/19/2024 2000 by Rustam Vo RN  Diversional Activities:   smartphone   television     Problem: Adjustment to Surgery (Radical Prostatectomy)  Goal: Optimal Coping  Outcome: Ongoing, Progressing  Intervention: Support Psychosocial Response to Prostate Cancer and Treatment  Description: Encourage verbalization of feelings regarding current situation and long-term impact of disease and treatment.  Provide information on realistic expectations; encourage questions, offer reassurance and provide support.  Recognize the psychosocial impact related to the potential alteration in sexual function; encourage discussion about sexual concerns and functioning with patient and partner.  Identify current coping strategies; assist with developing new.  Assess availability of social support; provide information about additional support opportunities.  Recent Flowsheet Documentation  Taken 6/19/2024 2000 by Rustam Vo, RN  Family/Support System Care: support provided     Problem: Bleeding (Radical  Prostatectomy)  Goal: Absence of Bleeding  Outcome: Ongoing, Progressing     Problem: Bowel Elimination Impaired (Radical Prostatectomy)  Goal: Effective Bowel Elimination  Outcome: Ongoing, Progressing     Problem: Fluid and Electrolyte Imbalance (Radical Prostatectomy)  Goal: Fluid and Electrolyte Balance  Outcome: Ongoing, Progressing     Problem: Infection (Radical Prostatectomy)  Goal: Absence of Infection Signs and Symptoms  Outcome: Ongoing, Progressing     Problem: Ongoing Anesthesia Effects (Radical Prostatectomy)  Goal: Anesthesia/Sedation Recovery  Outcome: Ongoing, Progressing  Intervention: Optimize Anesthesia Recovery  Description: Assess and monitor airway, breathing and circulation; maintain close surveillance for deterioration.  Implement continuous monitoring, such as cardiorespiratory, blood pressure, temperature, pulse oximetry and capnography.  Elevate head of bed, if able; facilitate regular position changes.  Assess neurocognitive function and for risks that may lead to postoperative delirium, such as decreased level of consciousness, pain and agitation; offer reassurance; answer questions.  Assess and monitor neurovascular and neuromuscular function, such as motor strength, tone, posture, peripheral pulses and extremity sensation; protect areas of decreased sensation from heat, cold, medical devices or objects.  Individualize frequency and intensity of monitoring based on sedation or anesthesia administered, identified risk factors, ongoing assessment and organizational protocol.  Prepare for administration of pharmacologic therapy, such as reversal agent, antiemetic or antipruritic medication, to manage sedation or anesthesia effects.  Adjust environment to maintain safety (e.g., fall precautions, safety equipment).  Recent Flowsheet Documentation  Taken 6/20/2024 0200 by Rustam Vo, RN  Safety Promotion/Fall Prevention:   activity supervised   assistive device/personal items within  reach   clutter free environment maintained   fall prevention program maintained   nonskid shoes/slippers when out of bed  Taken 6/20/2024 0000 by Rustam Vo RN  Safety Promotion/Fall Prevention:   activity supervised   assistive device/personal items within reach   clutter free environment maintained   fall prevention program maintained   nonskid shoes/slippers when out of bed  Taken 6/19/2024 2200 by Rustam Vo RN  Safety Promotion/Fall Prevention:   activity supervised   assistive device/personal items within reach   clutter free environment maintained   fall prevention program maintained   nonskid shoes/slippers when out of bed  Taken 6/19/2024 2000 by Rustam Vo RN  Safety Promotion/Fall Prevention:   activity supervised   assistive device/personal items within reach   clutter free environment maintained   fall prevention program maintained   nonskid shoes/slippers when out of bed   safety round/check completed     Problem: Pain (Radical Prostatectomy)  Goal: Acceptable Pain Control  Outcome: Ongoing, Progressing  Intervention: Prevent or Manage Pain  Description: Determine pain management plan with patient and caregiver/family; review plan regularly.  Evaluate risk for opioid use; individualize pharmacologic pain management plan and titrate medication to patient response.  Combine multimodal analgesia and nonpharmacologic strategies to help potentiate synergistic effects, enhance comfort and improve function (e.g., complementary therapy, diversional activity, mindfulness).  Provide around-the-clock dosing of pain medication to keep pain levels in control.  Manage medication-induced effects, such as respiratory depression, constipation, nausea, vomiting.  Minimize pain stimuli; coordinate care and adjust environment (e.g., light, noise, unnecessary movement); promote sleep/rest for optimal healing.  Recent Flowsheet Documentation  Taken 6/19/2024 2000 by Rustam Vo, MARIBEL  Diversional  Activities:   smartphone   television     Problem: Postoperative Nausea and Vomiting (Radical Prostatectomy)  Goal: Nausea and Vomiting Symptom Relief  Outcome: Ongoing, Progressing     Problem: Respiratory Compromise (Radical Prostatectomy)  Goal: Effective Oxygenation and Ventilation  Outcome: Ongoing, Progressing  Intervention: Optimize Oxygenation and Ventilation  Description: Recognize risk for obstructive sleep apnea; anticipate the need for continuous pulse oximetry and noninvasive positive pressure ventilation.  Maintain patent airway with use of positioning, airway adjuncts and secretion clearance.  Encourage pulmonary hygiene, such as cough-enhancement and airway-clearance techniques, that may include use of incentive spirometry, deep breathing and cough.  Anticipate the need for splinting with cough to minimize discomfort; assist if needed.  Provide oxygen therapy judiciously, if hypoxemia present.  Consider pharmacologic therapy that may improve mucus clearance and reduce bronchospasm, laryngospasm, swelling or stridor.  Consider the need for intubation and invasive positive pressure ventilation for longer recovery needs; use lung protective measures.  Recent Flowsheet Documentation  Taken 6/20/2024 0200 by Rustam Vo RN  Head of Bed (HOB) Positioning: HOB elevated  Taken 6/20/2024 0000 by Rustam Vo RN  Head of Bed (HOB) Positioning: HOB elevated  Taken 6/19/2024 2200 by Rustam Vo RN  Head of Bed (HOB) Positioning: HOB elevated  Taken 6/19/2024 2000 by Rustam Vo RN  Head of Bed (HOB) Positioning: HOB elevated     Problem: Urinary Elimination Impaired (Radical Prostatectomy)  Goal: Effective Urinary Elimination  Outcome: Ongoing, Progressing     Problem: Fall Injury Risk  Goal: Absence of Fall and Fall-Related Injury  Outcome: Ongoing, Progressing  Intervention: Identify and Manage Contributors  Description: Develop a fall prevention plan with the patient and  caregiver/family.  Provide reorientation, appropriate sensory stimulation and routines with changes in mental status to decrease risk of fall.  Promote use of personal vision and auditory aids.  Assess assistance level required for safe and effective self-care; provide support as needed, such as toileting, mobilization. For age 65 and older, implement timed toileting with assistance.  Encourage physical activity, such as performance of mobility and self-care at highest level of patient ability, multicomponent exercise program and provision of appropriate assistive devices.  If fall occurs, assess the severity of injury; implement fall injury protocol. Determine the cause and revise fall injury prevention plan.  Regularly review medication contribution to fall risk; adjust medication administration times to minimize risk of falling.  Consider risk related to polypharmacy and age.  Balance adequate pain management with potential for oversedation.  Recent Flowsheet Documentation  Taken 6/20/2024 0200 by Rustam Vo RN  Medication Review/Management: medications reviewed  Taken 6/20/2024 0000 by Rustam Vo RN  Medication Review/Management: medications reviewed  Taken 6/19/2024 2200 by Rustam Vo RN  Medication Review/Management: medications reviewed  Taken 6/19/2024 2000 by Rustam Vo RN  Medication Review/Management: medications reviewed  Intervention: Promote Injury-Free Environment  Description: Provide a safe, barrier-free environment that encourages independent activity.  Keep care area uncluttered and well-lighted.  Determine need for increased observation or monitoring.  Avoid use of devices that minimize mobility, such as restraints or indwelling urinary catheter.  Recent Flowsheet Documentation  Taken 6/20/2024 0200 by Rustam Vo RN  Safety Promotion/Fall Prevention:   activity supervised   assistive device/personal items within reach   clutter free environment maintained   fall  prevention program maintained   nonskid shoes/slippers when out of bed  Taken 6/20/2024 0000 by Rustam Vo RN  Safety Promotion/Fall Prevention:   activity supervised   assistive device/personal items within reach   clutter free environment maintained   fall prevention program maintained   nonskid shoes/slippers when out of bed  Taken 6/19/2024 2200 by Rustam Vo, RN  Safety Promotion/Fall Prevention:   activity supervised   assistive device/personal items within reach   clutter free environment maintained   fall prevention program maintained   nonskid shoes/slippers when out of bed  Taken 6/19/2024 2000 by Rustam Vo, RN  Safety Promotion/Fall Prevention:   activity supervised   assistive device/personal items within reach   clutter free environment maintained   fall prevention program maintained   nonskid shoes/slippers when out of bed   safety round/check completed   Goal Outcome Evaluation:

## 2024-06-20 NOTE — DISCHARGE SUMMARY
Patient Name: Cayden Nielsen  MRN: [id number]  : 1950  DOS: 2024    Attending: Ric Nunez Jr*    Primary Care Provider: Vannessa Tuttle DO    Date of Admission:.2024  7:13 AM    Date of Discharge:  2024    Discharge Diagnosis:   S/P prostatectomy, robotic with lymph nodes.    Hyperlipidemia LDL goal <70    Essential hypertension    Prostate cancer    CAD (coronary artery disease), s/p CABG       Hospital Course  At admit:  Patient is a pleasant 74 y.o. male presented for scheduled surgery by Dr. Mayra Larson.     He underwent prostate biopsy 2024. Pathology report shows widespread positive biopsies with adenocarcinoma of the prostate [place] score 4+3 = 7. PSA at the time of biopsy is 30.5. Bone scan 2024 is negative. CT scan of the abdomen and pelvis 2024 is negative genomic prostate scores 47.         Today he underwent robotic prostatectomy with lymph nodes, surgery was done under general anesthesia and a tap block, was tolerated well, is admitted for the management.     Seen in PACU postop, doing fairly well.  Drowsy, awakens briefly, tries to answer questions.     Reviewed patient's chart including his past medical history.  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  He is on a medical regimen including aspirin, statin, and a beta-blocker.    After admit:  He was provided pain medication as needed for pain control. CARLOS report on the chart was reviewed.    He received DVT prophylaxis with mechanicals, and Lovenox subcutaneously    His home medications were resumed as appropriate, he was started on clear liquid diet which was tolerated and advanced to regular diet .    He was provided a bowel regimen and was encouraged to ambulate frequently which he did.    During his stay he had evidence of bowel function return and he tolerated his by mouth diet well.    He had a ROGER drain postoperatively which has since been removed.      He  "continues to have a Sandoval catheter in place which he will keep attached to a leg bag until his follow-up appointment with urology.    When I saw him today he is doing quite well and he is ready for discharge home.        Procedures Performed  Procedure(s):  ROBOTIC PROSTATECTOMY WITH NODES       Pertinent Test Results:    I reviewed the patient's new clinical results.   Results from last 7 days   Lab Units 24  0626   WBC 10*3/mm3 16.03*   HEMOGLOBIN g/dL 13.8   HEMATOCRIT % 42.4   PLATELETS 10*3/mm3 191     Results from last 7 days   Lab Units 24  0626   SODIUM mmol/L 137   POTASSIUM mmol/L 4.8   CHLORIDE mmol/L 104   CO2 mmol/L 21.0*   BUN mg/dL 16   CREATININE mg/dL 1.03   CALCIUM mg/dL 8.6   GLUCOSE mg/dL 146*     I reviewed the patient's new imaging including images and reports.              Discharge Assessment:    Vital Signs  Visit Vitals  /70 (BP Location: Right arm, Patient Position: Sitting)   Pulse (!) 49   Temp 97.6 °F (36.4 °C) (Oral)   Resp 16   Ht 177.8 cm (70\")   Wt 81.6 kg (180 lb)   SpO2 96%   BMI 25.83 kg/m²     Temp (24hrs), Av.6 °F (36.4 °C), Min:97.6 °F (36.4 °C), Max:97.6 °F (36.4 °C)      General Appearance:    Alert, cooperative, in no acute distress   Lungs:     Clear to auscultation,respirations regular, even and                   unlabored    Heart:    Regular rhythm and normal rate, normal S1 and S2    Abdomen:   Soft, benign, clean incisions.  ROGER drain, out prior to discharge.  : Sandoval with clear urine   Extremities:   Moves all extremities well, no edema, no cyanosis, no              redness   Pulses:   Pulses palpable and equal bilaterally   Skin:   No bleeding, bruising or rash            Discharge Disposition: Home          Discharge Medications        New Medications        Instructions Start Date   docusate sodium 100 MG capsule  Commonly known as: Colace   100 mg, Oral, Daily PRN      HYDROcodone-acetaminophen 7.5-325 MG per tablet  Commonly known as: " NORCO   1 tablet, Oral, Every 6 Hours PRN      nitrofurantoin (macrocrystal-monohydrate) 100 MG capsule  Commonly known as: Macrobid   100 mg, Oral, 2 Times Daily             Continue These Medications        Instructions Start Date   aspirin 81 MG EC tablet   81 mg, Oral, Daily      atorvastatin 80 MG tablet  Commonly known as: LIPITOR   1 tablet, Oral, Daily      Casodex 50 MG tablet  Generic drug: bicalutamide   1 tablet, Oral, Daily      metoprolol tartrate 25 MG tablet  Commonly known as: LOPRESSOR   25 mg, Oral, Every 12 Hours Scheduled               Discharge Diet: Resume prior, advised to take it slow for several days    Activity at Discharge: Ambulate.  Restrictions per urology       Future Appointments   Date Time Provider Department Center   6/24/2024 12:45 PM Iqra Montes APRN [facility]   8/21/2024  1:30 PM Josh Olvera MD [facility]   4/30/2025  9:00 AM Vannessa Tuttle DO [facility]     Ric Nunez Jr, MD per his orders, his office will arrange    Dragon disclaimer:  Part of this encounter note is an electronic transcription/translation of spoken language to printed text. The electronic translation of spoken language may permit erroneous, or at times, nonsensical words or phrases to be inadvertently transcribed; Although I have reviewed the note for such errors, some may still exist.       Hiram Zuniga MD  06/20/24  14:10 EDT

## 2024-06-24 ENCOUNTER — OFFICE VISIT (OUTPATIENT)
Dept: FAMILY MEDICINE CLINIC | Facility: CLINIC | Age: 74
End: 2024-06-24
Payer: MEDICARE

## 2024-06-24 VITALS
RESPIRATION RATE: 14 BRPM | SYSTOLIC BLOOD PRESSURE: 118 MMHG | TEMPERATURE: 97.1 F | HEART RATE: 82 BPM | DIASTOLIC BLOOD PRESSURE: 68 MMHG | HEIGHT: 70 IN | BODY MASS INDEX: 26.54 KG/M2 | OXYGEN SATURATION: 96 % | WEIGHT: 185.4 LBS

## 2024-06-24 DIAGNOSIS — Z90.79 S/P PROSTATECTOMY: ICD-10-CM

## 2024-06-24 DIAGNOSIS — Z09 HOSPITAL DISCHARGE FOLLOW-UP: Primary | ICD-10-CM

## 2024-06-24 PROCEDURE — 99213 OFFICE O/P EST LOW 20 MIN: CPT | Performed by: NURSE PRACTITIONER

## 2024-06-24 PROCEDURE — 1159F MED LIST DOCD IN RCRD: CPT | Performed by: NURSE PRACTITIONER

## 2024-06-24 PROCEDURE — 1125F AMNT PAIN NOTED PAIN PRSNT: CPT | Performed by: NURSE PRACTITIONER

## 2024-06-24 PROCEDURE — 3074F SYST BP LT 130 MM HG: CPT | Performed by: NURSE PRACTITIONER

## 2024-06-24 PROCEDURE — 1160F RVW MEDS BY RX/DR IN RCRD: CPT | Performed by: NURSE PRACTITIONER

## 2024-06-24 PROCEDURE — 3078F DIAST BP <80 MM HG: CPT | Performed by: NURSE PRACTITIONER

## 2024-06-24 NOTE — PROGRESS NOTES
Date: 2024   Patient Name: Cayden Nielsen  : 1950   MRN: [id number]     Chief Complaint:    Chief Complaint   Patient presents with    Follow-up     Hospital discharge on 24 after Prostatectomy.        History of Present Illness: Cayden Nielsen is a 74 y.o. male who is here today to follow up for Went in on 24 for prostatectomy with Dr Nunez, and discharged on 24. 5 laparoscopic incisions on abdomen and RLQ ROGER drain. Having good urine output. No catheter insert discomfort. Put overall catheter is just not comfortable. He has not had a Bowel movement since surgery. Last night he had a dose of miralax and this morning. Goes to [facility] on Friday 9am, goes to see Dr Nunez at 10am and will possibly have catheter removed.          Review of Systems:   Review of Systems   Genitourinary:  Positive for difficulty urinating and penile pain.        Suprapubic pain       I have reviewed the patients family history, social history, past medical history, past surgical history and have updated it as appropriate.     Medications:     Current Outpatient Medications:     aspirin 81 MG EC tablet, Take 1 tablet by mouth Daily., Disp: , Rfl:     atorvastatin (LIPITOR) 80 MG tablet, Take 1 tablet by mouth Daily., Disp: , Rfl:     bicalutamide (Casodex) 50 MG tablet, Take 1 tablet by mouth Daily., Disp: , Rfl:     docusate sodium (Colace) 100 MG capsule, Take 1 capsule by mouth Daily As Needed for Constipation., Disp: 30 capsule, Rfl: 1    HYDROcodone-acetaminophen (NORCO) 7.5-325 MG per tablet, Take 1 tablet by mouth Every 6 (Six) Hours As Needed for Moderate Pain., Disp: 30 tablet, Rfl: 0    metoprolol tartrate (LOPRESSOR) 25 MG tablet, Take 1 tablet by mouth Every 12 (Twelve) Hours., Disp: 180 tablet, Rfl: 4    nitrofurantoin, macrocrystal-monohydrate, (Macrobid) 100 MG capsule, Take 1 capsule by mouth 2 (Two) Times a Day., Disp: 14 capsule, Rfl: 0    Allergies:   No Known Allergies    PHQ-9 Total  "Score:       Physical Exam:  Vital Signs:   Vitals:    06/24/24 1234   BP: 118/68   Pulse: 82   Resp: 14   Temp: 97.1 °F (36.2 °C)   SpO2: 96%   Weight: 84.1 kg (185 lb 6.4 oz)   Height: 177.8 cm (70\")     Body mass index is 26.6 kg/m².           Physical Exam  Vitals and nursing note reviewed.   Constitutional:       Appearance: Normal appearance.   HENT:      Head: Normocephalic and atraumatic.   Cardiovascular:      Rate and Rhythm: Normal rate and regular rhythm.   Pulmonary:      Effort: Pulmonary effort is normal.      Breath sounds: Normal breath sounds.   Abdominal:      General: Bowel sounds are normal.   Skin:     General: Skin is warm.      Comments: 5 incision sites with bruising present. RLQ with old ROGER drain site.   Neurological:      General: No focal deficit present.      Mental Status: He is alert and oriented to person, place, and time.   Psychiatric:         Mood and Affect: Mood normal.           Assessment/Plan:   Diagnoses and all orders for this visit:    1. Hospital discharge follow-up (Primary)    2. S/P prostatectomy, robotic with lymph nodes.    Reviewed Hospital/procedure documents  Educated on importance of good hygiene, while catheter is in place  Take tylenol PRN to aid in pain  Drink plenty of water  Monitor for s/s of bleeding, fevers  Keep appts with Dr Goldsmith    *25 minutes spent with patient    Follow Up:   Return for Next scheduled follow up.      Iqra Montes. FRANCISCA   [facility]     "

## 2024-06-26 ENCOUNTER — TELEPHONE (OUTPATIENT)
Dept: FAMILY MEDICINE CLINIC | Facility: CLINIC | Age: 74
End: 2024-06-26
Payer: MEDICARE

## 2024-06-26 DIAGNOSIS — K59.00 CONSTIPATION, UNSPECIFIED CONSTIPATION TYPE: Primary | ICD-10-CM

## 2024-06-26 NOTE — TELEPHONE ENCOUNTER
Caller: Cayden Nielsen    Relationship: Self    Best call back number: [phone]     What medication are you requesting: MEDICATION DISCUSSED AT 6/24/24 APPOINTMENT TO HELP WITH USING THE RESTROOM    If a prescription is needed, what is your preferred pharmacy and phone number: [id number] - [address] [phone] [facility] [phone]      Additional notes:  PLEASE NOTIFY PATIENT WHEN THIS PRESCRIPTION HAS BEEN CALLED IN OR IF THERE ARE ANY QUESTIONS/CONCERNS.

## 2024-06-26 NOTE — TELEPHONE ENCOUNTER
Caller: Cayden Nielsen    Relationship to patient: Self    Best call back number: [phone]     Patient is needing: PATIENT CALLED TO CHECK ON THE STATUS OF THIS REQUEST

## 2024-06-27 RX ORDER — AMOXICILLIN 250 MG
1 CAPSULE ORAL 2 TIMES DAILY
Qty: 30 TABLET | Refills: 0 | Status: SHIPPED | OUTPATIENT
Start: 2024-06-27

## 2024-07-08 LAB
CYTO UR: NORMAL
LAB AP CASE REPORT: NORMAL
LAB AP CLINICAL INFORMATION: NORMAL
PATH REPORT.ADDENDUM SPEC: NORMAL
PATH REPORT.FINAL DX SPEC: NORMAL
PATH REPORT.GROSS SPEC: NORMAL

## 2024-07-30 DIAGNOSIS — E78.00 HIGH CHOLESTEROL: ICD-10-CM

## 2024-07-31 RX ORDER — ATORVASTATIN CALCIUM 80 MG/1
80 TABLET, FILM COATED ORAL NIGHTLY
Qty: 90 TABLET | Refills: 1 | Status: SHIPPED | OUTPATIENT
Start: 2024-07-31

## 2024-08-19 DIAGNOSIS — E78.00 HIGH CHOLESTEROL: ICD-10-CM

## 2024-08-19 RX ORDER — ATORVASTATIN CALCIUM 40 MG/1
80 TABLET, FILM COATED ORAL NIGHTLY
Qty: 180 TABLET | Refills: 3 | Status: SHIPPED | OUTPATIENT
Start: 2024-08-19

## 2024-08-19 NOTE — TELEPHONE ENCOUNTER
Patient came into the office and stated that he is unable to swallow the 80 mg tablets of the ATORVASTATIN. He wanted to know if we could send it in to [facility] for him to take 2 40 mg tablets. He only has 3 pills left.

## 2024-08-19 NOTE — PROGRESS NOTES
Subjective:     Encounter Date:08/21/2024    Primary Care Physician: Vannessa Tuttle DO      Patient ID: Cayden Nielsen is a 74 y.o. male.    Chief Complaint:Coronary Artery Disease    PROBLEM LIST:  Coronary artery disease  11/23/2022 NSTEMI with multivessel CAD.  11/25/2022 CABG x5 Dr. Nika PILLAI to LAD, VG to marginal, VG to PDA, VG to PLV, VG to PDA/PLV secondary branch.  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  He has done well from a cardiovascular standpoint without angina shortness breath orthopnea PND or any cardiovascular symptoms.  He underwent radical prostatectomy in June of this year for prostate cancer.  Further treatment options are pending.  He is doing well.    The following portions of the patient's history were reviewed and updated as appropriate: allergies, current medications, past family history, past medical history, past social history, past surgical history and problem list.      Social History     Tobacco Use    Smoking status: Never    Smokeless tobacco: Never   Vaping Use    Vaping status: Never Used   Substance Use  "Topics    Alcohol use: No    Drug use: No         ROS       Objective:   /77   Pulse 50   Ht 177.8 cm (70\")   Wt 85.3 kg (188 lb)   SpO2 96%   BMI 26.98 kg/m²         Vitals reviewed.   Constitutional:       Appearance: Well-developed and not in distress.   Neck:      Thyroid: No thyromegaly.      Vascular: No carotid bruit or JVD.   Pulmonary:      Breath sounds: Normal breath sounds.   Cardiovascular:      Regular rhythm.      No gallop. No S3 and S4 gallop.   Pulses:     Intact distal pulses.      Carotid: 2+ bilaterally.     Radial: 2+ bilaterally.  Edema:     Peripheral edema absent.   Abdominal:      General: Bowel sounds are normal.      Palpations: Abdomen is soft. There is no abdominal mass.      Tenderness: There is no abdominal tenderness.   Musculoskeletal:         General: No deformity.      Extremities: No clubbing present.Skin:     General: Skin is warm and dry.      Findings: No rash.   Neurological:      Mental Status: Alert and oriented to person, place, and time.         Procedures          Assessment:   Assessment & Plan      Diagnoses and all orders for this visit:    1. Coronary artery disease involving native coronary artery of native heart without angina pectoris (Primary)      1.  Coronary artery disease, post CABG.  Stable without angina  2.  Carotid artery disease mild asymptomatic  3.  Hypertension well-controlled  4.  Lipidemia LDL less than 70 on atorvastatin  5.  Postoperative atrial fibrillation resolved    Recommendations:  1.  Continue current preventative strategies  2.  No change in medication  3.  Revisit annually apparent symptom change         Advance Care Planning   ACP discussion was held with the patient during this visit. Patient does not have an advance directive, declines further assistance.        Dictated utilizing Dragon dictation  "

## 2024-08-21 ENCOUNTER — OFFICE VISIT (OUTPATIENT)
Dept: CARDIOLOGY | Facility: CLINIC | Age: 74
End: 2024-08-21
Payer: MEDICARE

## 2024-08-21 VITALS
HEART RATE: 50 BPM | HEIGHT: 70 IN | BODY MASS INDEX: 26.92 KG/M2 | WEIGHT: 188 LBS | SYSTOLIC BLOOD PRESSURE: 151 MMHG | OXYGEN SATURATION: 96 % | DIASTOLIC BLOOD PRESSURE: 77 MMHG

## 2024-08-21 DIAGNOSIS — I25.10 CORONARY ARTERY DISEASE INVOLVING NATIVE CORONARY ARTERY OF NATIVE HEART WITHOUT ANGINA PECTORIS: Primary | ICD-10-CM

## 2025-03-27 ENCOUNTER — LAB (OUTPATIENT)
Dept: LAB | Facility: HOSPITAL | Age: 75
End: 2025-03-27
Payer: MEDICARE

## 2025-03-27 ENCOUNTER — TRANSCRIBE ORDERS (OUTPATIENT)
Dept: LAB | Facility: HOSPITAL | Age: 75
End: 2025-03-27
Payer: MEDICARE

## 2025-03-27 DIAGNOSIS — C61 MALIGNANT NEOPLASM OF PROSTATE: ICD-10-CM

## 2025-03-27 DIAGNOSIS — C61 MALIGNANT NEOPLASM OF PROSTATE: Primary | ICD-10-CM

## 2025-03-27 LAB — PSA SERPL-MCNC: <0.014 NG/ML (ref 0–4)

## 2025-03-27 PROCEDURE — 84153 ASSAY OF PSA TOTAL: CPT

## 2025-03-27 PROCEDURE — 36415 COLL VENOUS BLD VENIPUNCTURE: CPT

## 2025-04-30 ENCOUNTER — OFFICE VISIT (OUTPATIENT)
Dept: FAMILY MEDICINE CLINIC | Facility: CLINIC | Age: 75
End: 2025-04-30
Payer: MEDICARE

## 2025-04-30 VITALS
DIASTOLIC BLOOD PRESSURE: 84 MMHG | SYSTOLIC BLOOD PRESSURE: 138 MMHG | BODY MASS INDEX: 27.97 KG/M2 | HEIGHT: 70 IN | OXYGEN SATURATION: 97 % | RESPIRATION RATE: 18 BRPM | TEMPERATURE: 97.1 F | WEIGHT: 195.4 LBS | HEART RATE: 64 BPM

## 2025-04-30 DIAGNOSIS — C61 PROSTATE CANCER: ICD-10-CM

## 2025-04-30 DIAGNOSIS — I10 ESSENTIAL HYPERTENSION: ICD-10-CM

## 2025-04-30 DIAGNOSIS — Z00.00 MEDICARE ANNUAL WELLNESS VISIT, INITIAL: Primary | ICD-10-CM

## 2025-04-30 DIAGNOSIS — E78.00 HIGH CHOLESTEROL: ICD-10-CM

## 2025-04-30 NOTE — PROGRESS NOTES
Subjective   The ABCs of the Annual Wellness Visit  Medicare Wellness Visit      Cayden Nielsen is a 74 y.o. patient who presents for a Medicare Wellness Visit.    The following portions of the patient's history were reviewed and   updated as appropriate: allergies, current medications, past family history, past medical history, past social history, past surgical history, and problem list.    Compared to one year ago, the patient's physical   health is the same.  Compared to one year ago, the patient's mental   health is the same.    Recent Hospitalizations:  He was admitted within the past 365 days at [facility] June 2024.     Current Medical Providers:  Patient Care Team:  Vannessa Tuttle DO as PCP - General (Family Medicine)  John Cortez MD as Consulting Physician (Cardiology)  Josh Olvera MD as Consulting Physician (Cardiology)    Outpatient Medications Prior to Visit   Medication Sig Dispense Refill    aspirin 81 MG EC tablet Take 1 tablet by mouth Daily.      atorvastatin (LIPITOR) 40 MG tablet Take 2 tablets by mouth Every Night. 180 tablet 3    metoprolol tartrate (LOPRESSOR) 25 MG tablet Take 1 tablet by mouth Every 12 (Twelve) Hours. 180 tablet 4     No facility-administered medications prior to visit.     No opioid medication identified on active medication list. I have reviewed chart for other potential  high risk medication/s and harmful drug interactions in the elderly.      Aspirin is on active medication list. Aspirin use is indicated based on review of current medical condition/s. Pros and cons of this therapy have been discussed today. Benefits of this medication outweigh potential harm.  Patient has been encouraged to continue taking this medication.  .      Patient Active Problem List   Diagnosis    Hyperlipidemia LDL goal <70    NSTEMI s/p CABG 11/25/2022    Essential hypertension    Prostate cancer    CAD (coronary artery disease), s/p CABG 2022    S/P prostatectomy,  "robotic with lymph nodes.     Advance Care Planning Advance Directive is not on file.  ACP discussion was held with the patient during this visit. Patient does not have an advance directive, declines further assistance.            Objective   Vitals:    25 0854   BP: 138/84   Pulse: 64   Resp: 18   Temp: 97.1 °F (36.2 °C)   SpO2: 97%   Weight: 88.6 kg (195 lb 6.4 oz)   Height: 177.8 cm (70\")   PainSc: 0-No pain       Estimated body mass index is 28.04 kg/m² as calculated from the following:    Height as of this encounter: 177.8 cm (70\").    Weight as of this encounter: 88.6 kg (195 lb 6.4 oz).    BMI is >= 25 and <30. (Overweight) The following options were offered after discussion;: information on healthy weight added to patient's after visit summary            Does the patient have evidence of cognitive impairment? No                                                                                                Health  Risk Assessment    Smoking Status:  Social History     Tobacco Use   Smoking Status Never   Smokeless Tobacco Never     Alcohol Consumption:  Social History     Substance and Sexual Activity   Alcohol Use No       Fall Risk Screen  STEADI Fall Risk Assessment was completed, and patient is at LOW risk for falls.Assessment completed on:2025    Depression Screening   Little interest or pleasure in doing things? Not at all   Feeling down, depressed, or hopeless? Not at all   PHQ-2 Total Score 0      Health Habits and Functional and Cognitive Screenin/30/2025     8:54 AM   Functional & Cognitive Status   Do you have difficulty preparing food and eating? No   Do you have difficulty bathing yourself, getting dressed or grooming yourself? No   Do you have difficulty using the toilet? No   Do you have difficulty moving around from place to place? No   Do you have trouble with steps or getting out of a bed or a chair? No   Current Diet Well Balanced Diet   Dental Exam Up to date   Eye Exam " Up to date   Exercise (times per week) 3 times per week   Current Exercises Include Walking   Do you need help using the phone?  No   Are you deaf or do you have serious difficulty hearing?  No   Do you need help to go to places out of walking distance? No   Do you need help shopping? No   Do you need help preparing meals?  No   Do you need help with housework?  No   Do you need help with laundry? No   Do you need help taking your medications? No   Do you need help managing money? No   Do you ever drive or ride in a car without wearing a seat belt? No   Have you felt unusual stress, anger or loneliness in the last month? No   Who do you live with? Spouse   If you need help, do you have trouble finding someone available to you? No   Have you been bothered in the last four weeks by sexual problems? No   Do you have difficulty concentrating, remembering or making decisions? No           Age-appropriate Screening Schedule:  Refer to the list below for future screening recommendations based on patient's age, sex and/or medical conditions. Orders for these recommended tests are listed in the plan section. The patient has been provided with a written plan.    Health Maintenance List  Health Maintenance   Topic Date Due    ZOSTER VACCINE (1 of 2) Never done    LIPID PANEL  05/02/2025    COVID-19 Vaccine (3 - 2024-25 season) 05/14/2025 (Originally 9/1/2024)    Pneumococcal Vaccine 50+ (1 of 1 - PCV) 10/27/2025 (Originally 6/2/2000)    COLORECTAL CANCER SCREENING  04/30/2026 (Originally 6/2/1995)    INFLUENZA VACCINE  07/01/2025    TDAP/TD VACCINES (2 - Td or Tdap) 01/01/2026    ANNUAL WELLNESS VISIT  04/30/2026    HEPATITIS C SCREENING  Completed                                                                                                                                                CMS Preventative Services Quick Reference  Risk Factors Identified During Encounter  None Identified    The above risks/problems have been  "discussed with the patient.  Pertinent information has been shared with the patient in the After Visit Summary.  An After Visit Summary and PPPS were made available to the patient.    Follow Up:   Next Medicare Wellness visit to be scheduled in 1 year.         Additional E&M Note during same encounter follows:  Patient has additional, significant, and separately identifiable condition(s)/problem(s) that require work above and beyond the Medicare Wellness Visit     Chief Complaint  Medicare Wellness-subsequent (Pt is fasting.)    Subjective    HPI  Cayden is also being seen today for additional medical problem/s.       The patient presents for a Medicare wellness exam.    He was diagnosed with prostate cancer last year. He is accompanied by his wife. The patient is currently under the care of Dr. Nunez, a urologist and oncologist at [facility], with alternating visits every 3 months between Dr. Nunez and another physician. The most recent PSA results were good. No family history of prostate cancer is reported. He has never smoked.    Cataracts have been progressively worsening over the past year. His wife has undergone cataract surgery, back surgery, and knee replacement surgeries, with the most recent knee replacement occurring a week from this past Monday.    He has not undergone any screening for colon cancer to date. No family history of colon cancer is reported.    A living will and advance directive are not filed.    PAST SURGICAL HISTORY:   Prostate surgery for prostate cancer in 06/2024.     SOCIAL HISTORY  He has never smoked. He has 4 grandsons.    FAMILY HISTORY  He reports no family history of prostate cancer or colon cancer.          Objective   Vital Signs:  /84   Pulse 64   Temp 97.1 °F (36.2 °C)   Resp 18   Ht 177.8 cm (70\")   Wt 88.6 kg (195 lb 6.4 oz)   SpO2 97%   BMI 28.04 kg/m²   Physical Exam  Vitals and nursing note reviewed.   Constitutional:       Appearance: Normal " appearance.   HENT:      Right Ear: Tympanic membrane, ear canal and external ear normal.      Left Ear: Tympanic membrane, ear canal and external ear normal.   Cardiovascular:      Rate and Rhythm: Normal rate and regular rhythm.      Heart sounds: Normal heart sounds.   Pulmonary:      Effort: Pulmonary effort is normal.      Breath sounds: Normal breath sounds.   Musculoskeletal:      Cervical back: Neck supple.   Neurological:      Mental Status: He is alert.   Psychiatric:         Mood and Affect: Mood normal.                     Results             Assessment and Plan   Diagnoses and all orders for this visit:    1. Medicare annual wellness visit, initial (Primary)  -     CBC (No Diff)  -     Comprehensive Metabolic Panel  -     Lipid Panel With / Chol / HDL Ratio    2. High cholesterol  -     CBC (No Diff)  -     Comprehensive Metabolic Panel  -     Lipid Panel With / Chol / HDL Ratio    3. Essential hypertension  -     CBC (No Diff)  -     Comprehensive Metabolic Panel  -     Lipid Panel With / Chol / HDL Ratio    4. Prostate cancer  -     CBC (No Diff)  -     Comprehensive Metabolic Panel  -     Lipid Panel With / Chol / HDL Ratio           1. Prostate cancer.  - His most recent Prostate-Specific Antigen (PSA) levels were within normal limits.  - Continues to see his urologist and oncologist alternately every 3 months for follow-up.  - No family history of prostate cancer reported.  - Treatment and follow-up records from the oncology clinic are available.    2. Cataracts.  - Reports having cataracts and is monitoring their progression.  - Vision is gradually worsening, but surgery is not yet scheduled.  - Discussed that cataract surgery is generally a straightforward procedure with favorable outcomes.  - Will consider surgery when vision impairment becomes significant.    3. Health maintenance.  - Has not yet undergone colon cancer screening.  - Advised to consider either a colonoscopy or a stool kit for  colon cancer screening.  - Comprehensive blood work panel ordered today to update cholesterol and other health markers.         Follow Up   Return in about 1 year (around 4/30/2026) for Medicare Wellness.  Patient was given instructions and counseling regarding his condition or for health maintenance advice. Please see specific information pulled into the AVS if appropriate.  Patient or patient representative verbalized consent for the use of Ambient Listening during the visit with  Vannessa Tuttle DO for chart documentation. 4/30/2025  10:12 EDT

## 2025-04-30 NOTE — PATIENT INSTRUCTIONS
Medicare Wellness  Personal Prevention Plan of Service     Date of Office Visit:    Encounter Provider:  Vannessa Tuttle DO  Place of Service:  [facility]  Patient Name: Cayden Nielsen  :  1950    As part of the Medicare Wellness portion of your visit today, we are providing you with this personalized preventive plan of services (PPPS). This plan is based upon recommendations of the [place] Preventive Services Task Force (USPSTF) and the Advisory Committee on Immunization Practices (ACIP).    This lists the preventive care services that should be considered, and provides dates of when you are due. Items listed as completed are up-to-date and do not require any further intervention.    Health Maintenance   Topic Date Due    COLORECTAL CANCER SCREENING  Never done    ZOSTER VACCINE (1 of 2) Never done    LIPID PANEL  2025    COVID-19 Vaccine (3 - - season) 2025 (Originally 2024)    Pneumococcal Vaccine 50+ (1 of 1 - PCV) 10/27/2025 (Originally 2000)    INFLUENZA VACCINE  2025    TDAP/TD VACCINES (2 - Td or Tdap) 2026    ANNUAL WELLNESS VISIT  2026    HEPATITIS C SCREENING  Completed       Orders Placed This Encounter   Procedures    CBC (No Diff)     Release to patient:   Routine Release [[id number]]    Comprehensive Metabolic Panel     Release to patient:   Routine Release [[id number]]    Lipid Panel With / Chol / HDL Ratio     Release to patient:   Routine Release [[id number]]       No follow-ups on file.

## 2025-05-01 LAB
ALBUMIN SERPL-MCNC: 4.1 G/DL (ref 3.5–5.2)
ALBUMIN/GLOB SERPL: 1.6 G/DL
ALP SERPL-CCNC: 141 U/L (ref 39–117)
ALT SERPL-CCNC: 21 U/L (ref 1–41)
AST SERPL-CCNC: 28 U/L (ref 1–40)
BILIRUB SERPL-MCNC: 0.5 MG/DL (ref 0–1.2)
BUN SERPL-MCNC: 25 MG/DL (ref 8–23)
BUN/CREAT SERPL: 20.8 (ref 7–25)
CALCIUM SERPL-MCNC: 8.7 MG/DL (ref 8.6–10.5)
CHLORIDE SERPL-SCNC: 105 MMOL/L (ref 98–107)
CHOLEST SERPL-MCNC: 122 MG/DL (ref 0–200)
CHOLEST/HDLC SERPL: 3.3 {RATIO}
CO2 SERPL-SCNC: 22.5 MMOL/L (ref 22–29)
CREAT SERPL-MCNC: 1.2 MG/DL (ref 0.76–1.27)
EGFRCR SERPLBLD CKD-EPI 2021: 63.5 ML/MIN/1.73
ERYTHROCYTE [DISTWIDTH] IN BLOOD BY AUTOMATED COUNT: 13.3 % (ref 12.3–15.4)
GLOBULIN SER CALC-MCNC: 2.5 GM/DL
GLUCOSE SERPL-MCNC: 95 MG/DL (ref 65–99)
HCT VFR BLD AUTO: 43.6 % (ref 37.5–51)
HDLC SERPL-MCNC: 37 MG/DL (ref 40–60)
HGB BLD-MCNC: 14.3 G/DL (ref 13–17.7)
LDLC SERPL CALC-MCNC: 70 MG/DL (ref 0–100)
MCH RBC QN AUTO: 29.7 PG (ref 26.6–33)
MCHC RBC AUTO-ENTMCNC: 32.8 G/DL (ref 31.5–35.7)
MCV RBC AUTO: 90.6 FL (ref 79–97)
PLATELET # BLD AUTO: 193 10*3/MM3 (ref 140–450)
POTASSIUM SERPL-SCNC: 4.3 MMOL/L (ref 3.5–5.2)
PROT SERPL-MCNC: 6.6 G/DL (ref 6–8.5)
RBC # BLD AUTO: 4.81 10*6/MM3 (ref 4.14–5.8)
SODIUM SERPL-SCNC: 139 MMOL/L (ref 136–145)
TRIGL SERPL-MCNC: 76 MG/DL (ref 0–150)
VLDLC SERPL CALC-MCNC: 15 MG/DL (ref 5–40)
WBC # BLD AUTO: 7.38 10*3/MM3 (ref 3.4–10.8)

## 2025-05-06 DIAGNOSIS — E78.00 HIGH CHOLESTEROL: ICD-10-CM

## 2025-05-06 RX ORDER — ATORVASTATIN CALCIUM 40 MG/1
80 TABLET, FILM COATED ORAL NIGHTLY
Qty: 180 TABLET | Refills: 3 | OUTPATIENT
Start: 2025-05-06

## 2025-08-05 DIAGNOSIS — E78.00 HIGH CHOLESTEROL: ICD-10-CM

## 2025-08-06 RX ORDER — ATORVASTATIN CALCIUM 40 MG/1
80 TABLET, FILM COATED ORAL NIGHTLY
Qty: 180 TABLET | Refills: 3 | Status: SHIPPED | OUTPATIENT
Start: 2025-08-06

## 2025-08-11 RX ORDER — METOPROLOL TARTRATE 25 MG/1
25 TABLET, FILM COATED ORAL EVERY 12 HOURS SCHEDULED
Qty: 180 TABLET | Refills: 4 | Status: SHIPPED | OUTPATIENT
Start: 2025-08-11

## 2025-08-27 ENCOUNTER — OFFICE VISIT (OUTPATIENT)
Dept: CARDIOLOGY | Facility: CLINIC | Age: 75
End: 2025-08-27
Payer: MEDICARE

## 2025-08-27 VITALS
BODY MASS INDEX: 27.57 KG/M2 | WEIGHT: 192.6 LBS | HEART RATE: 62 BPM | HEIGHT: 70 IN | DIASTOLIC BLOOD PRESSURE: 79 MMHG | OXYGEN SATURATION: 96 % | SYSTOLIC BLOOD PRESSURE: 120 MMHG

## 2025-08-27 DIAGNOSIS — I25.10 CORONARY ARTERY DISEASE INVOLVING NATIVE CORONARY ARTERY OF NATIVE HEART WITHOUT ANGINA PECTORIS: Primary | ICD-10-CM

## (undated) DEVICE — ANTIBACTERIAL UNDYED BRAIDED (POLYGLACTIN 910), SYNTHETIC ABSORBABLE SUTURE: Brand: COATED VICRYL

## (undated) DEVICE — CATH DIAG EXPO M/ PK 5F FL4/FR4 PIG

## (undated) DEVICE — ADAPT/Y PERFUS DLP FML/LUER COLR/CODE/CLMP 8.9AND25.4CM

## (undated) DEVICE — CONNECT Y INTERSEPT W/LL 3/8 X 3/8 X 3/8IN

## (undated) DEVICE — CANN AORT ROOT DLP VNT 14G 7F

## (undated) DEVICE — SUT ETHIB 2/0 SH SH 36IN X523H

## (undated) DEVICE — SUT ETHIB 1 CTX CR8 18IN CX30D

## (undated) DEVICE — Device

## (undated) DEVICE — DR ROGERS OH: Brand: MEDLINE INDUSTRIES, INC.

## (undated) DEVICE — CATHETER,URETHRAL,REDRUBBER,STERILE,22FR: Brand: MEDLINE

## (undated) DEVICE — CATH DIAG EXPO .045 AL1 5F 100CM

## (undated) DEVICE — PK CATH CARD 10

## (undated) DEVICE — ST TBG AIRSEAL FLTR TRI LUM

## (undated) DEVICE — PK ATS CUST W CARDIOTOMY RESEVOIR

## (undated) DEVICE — LEVEL SENSORS PADS ARE USED TO ATTACH THE LEVEL SENSORS TO A HARD SHELL RESERVOIR. INCLUDES COUPLING GEL.: Brand: TERUMO® ADVANCED PERFUSION SYSTEM 1

## (undated) DEVICE — ARM DRAPE

## (undated) DEVICE — SUT MNCRYL UROLOG UR6 2/0 27IN UNDYED

## (undated) DEVICE — PK PERFUS CUST W/CARDIOPLEGIA

## (undated) DEVICE — PENCL SMOKE/EVAC MEGADYNE TELESCP 10FT

## (undated) DEVICE — FLTR RESERV PERFUS INTERSEPT 02 STRL

## (undated) DEVICE — CVR PROB ULTRASND/TRANSD W/GEL 18X120CM STRL

## (undated) DEVICE — SUT MNCRYL UROLOGICAL UR6 2/0 27IN Y605H

## (undated) DEVICE — CATH FOL BARDEX 2WY 20F 30CC

## (undated) DEVICE — GLV SURG SENSICARE PI MIC PF SZ7.5 LF STRL

## (undated) DEVICE — CATH DIAG EXPO .045 FL3  5F 100CM

## (undated) DEVICE — GW PT 2 MS .014 185CM STR TP

## (undated) DEVICE — SUT SILK B CARDIO BB 5/0 30IN K880H BX/36

## (undated) DEVICE — SYS VASOVIEW HEMOPRO ENDOSCOPIC HARVST VESL

## (undated) DEVICE — TBG SXN INTRACARD RIDGID FLUT 24F .25X13IN A/

## (undated) DEVICE — KT WARM LAP LIQUIDSCOPE/HELPOR W/WARMOR/CLTH 2/TROC/SWAB

## (undated) DEVICE — LEX DAVINCI PROSTATE: Brand: MEDLINE INDUSTRIES, INC.

## (undated) DEVICE — TRAP FLD MINIVAC MEGADYNE 100ML

## (undated) DEVICE — VIOLET POLYDIOXANONE POLYMER, SYNTHETIC ABSORBABLE SUTURE CLIPS: Brand: LAPRA-TY

## (undated) DEVICE — TBG PENCL TELESCP MEGADYNE SMOKE EVAC 10FT

## (undated) DEVICE — PAD ARMBRD SURG CONVOL 7.5X20X2IN

## (undated) DEVICE — ENDOPOUCH RETRIEVER SPECIMEN RETRIEVAL BAGS: Brand: ENDOPOUCH RETRIEVER

## (undated) DEVICE — SUT SILK 2/0 TIES 18IN A185H

## (undated) DEVICE — MODEL BT2000 P/N 700287-012KIT CONTENTS: MANIFOLD WITH SALINE AND CONTRAST PORTS, SALINE TUBING WITH SPIKE AND HAND SYRINGE, TRANSDUCER: Brand: BT2000 AUTOMATED MANIFOLD KIT

## (undated) DEVICE — SUT SILK 0/0 CT2 18IN C027D

## (undated) DEVICE — SYRINGE,TOOMEY,IRRIGATION,70CC,STERILE: Brand: MEDLINE

## (undated) DEVICE — GW INQWIRE FC PTFE STD J/1.5 .035 260

## (undated) DEVICE — 3M™ MEDIPORE™ H SOFT CLOTH SURGICAL TAPE, 2863, 3 IN X 10 YD, 12/CASE: Brand: 3M™ MEDIPORE™

## (undated) DEVICE — TROC BLADLES ANCHORPORT/OPTI LP 12X120MM 1P/U

## (undated) DEVICE — AVID DUAL STAGE VENOUS DRAINAGE CANNULA: Brand: AVID DUAL STAGE VENOUS DRAINAGE CANNULA

## (undated) DEVICE — ELECTRD BLD EZ CLN STD 2.5IN

## (undated) DEVICE — SUT PDS MONO 0 36 CT1 VIL PDP346H

## (undated) DEVICE — DRSNG WND BORDR/ADHS NONADHR/GZ LF 2X2IN STRL

## (undated) DEVICE — PK HEART OPN 10

## (undated) DEVICE — SUT SILK 2/0 PS 18IN 1588H

## (undated) DEVICE — DEV INFL MONARCH 25W

## (undated) DEVICE — SUT PROLN 4/0 SH D/A 36IN 8521H

## (undated) DEVICE — MODEL AT P65, P/N 701554-001KIT CONTENTS: HAND CONTROLLER, 3-WAY HIGH-PRESSURE STOPCOCK WITH ROTATING END AND PREMIUM HIGH-PRESSURE TUBING: Brand: ANGIOTOUCH® KIT

## (undated) DEVICE — SUT PROLN 6/0 C1 D/A 30IN 8706H

## (undated) DEVICE — CATH FOL BARDEX 2WY 18F 5CC

## (undated) DEVICE — SUT VIC 0 TIES 18IN J912G

## (undated) DEVICE — SUT PROLN 7/0 BV1 D/A 24IN 8702H

## (undated) DEVICE — GUIDE SELECT ATRICLIP

## (undated) DEVICE — SUT PROLN 7/0 8747H

## (undated) DEVICE — SUT SILK 2/0 CT1 CR8 18IN C022D

## (undated) DEVICE — SENSR CERBRL O2 PK/2

## (undated) DEVICE — BLADELESS OBTURATOR: Brand: WECK VISTA

## (undated) DEVICE — SEAL

## (undated) DEVICE — GOWN,NON-REINFORCED,SIRUS,SET IN SLV,XL: Brand: MEDLINE

## (undated) DEVICE — EZ GLIDE AORTIC CANNULA: Brand: EDWARDS LIFESCIENCES EZ GLIDE AORTIC CANNULA

## (undated) DEVICE — SUT SILK 4/0 TIES 18IN A183H

## (undated) DEVICE — RETROGRADE CARDIOPLEGIA CATHETER: Brand: EDWARDS LIFESCIENCES RETROGRADE CARDIOPLEGIA CATHETER

## (undated) DEVICE — TB SXN SURG DLP MALL/CARD SFT/TP 6F 6IN

## (undated) DEVICE — GUIDE CATHETER: Brand: MACH1™

## (undated) DEVICE — INTRO SHEATH PRELUDE IDEAL SPRNG COIL 021 6F 23X80CM

## (undated) DEVICE — TIP COVER ACCESSORY

## (undated) DEVICE — CLTH CLENS READYCLEANSE PERI CARE PK/5

## (undated) DEVICE — OASIS DRAIN, SINGLE, INLINE & ATS COMPATIBLE: Brand: OASIS

## (undated) DEVICE — GLV SURG GRN DERMASSURE LF PF 7.5

## (undated) DEVICE — TRAY,URETHRAL,CATHETER,RED-RUBBER,15FR: Brand: MEDLINE

## (undated) DEVICE — PATIENT RETURN ELECTRODE, SINGLE-USE, CONTACT QUALITY MONITORING, ADULT, WITH 9FT CORD, FOR PATIENTS WEIGING OVER 33LBS. (15KG): Brand: MEGADYNE

## (undated) DEVICE — BLANKT WARM UPPR/BDY ARM/OUT 57X196CM

## (undated) DEVICE — COLUMN DRAPE

## (undated) DEVICE — ENDOPATH XCEL BLADELESS TROCARS WITH STABILITY SLEEVES: Brand: ENDOPATH XCEL

## (undated) DEVICE — SUT SILK 2 SUTUPAK TIE 60IN SA8H 2STRAND

## (undated) DEVICE — MINI TREK CORONARY DILATATION CATHETER 2.0 MM X 12 MM / RAPID-EXCHANGE: Brand: MINI TREK

## (undated) DEVICE — CANN VESL DLP 1WY BLNT/TP 3MM

## (undated) DEVICE — DRP SLUSH MACH